# Patient Record
Sex: FEMALE | Race: OTHER | Employment: OTHER | URBAN - METROPOLITAN AREA
[De-identification: names, ages, dates, MRNs, and addresses within clinical notes are randomized per-mention and may not be internally consistent; named-entity substitution may affect disease eponyms.]

---

## 2017-02-21 RX ORDER — ALPRAZOLAM 0.25 MG/1
0.25 TABLET ORAL 2 TIMES DAILY PRN
Qty: 60 TABLET | Refills: 0 | Status: SHIPPED | OUTPATIENT
Start: 2017-02-21 | End: 2017-03-21 | Stop reason: SDUPTHER

## 2017-03-21 ENCOUNTER — OFFICE VISIT (OUTPATIENT)
Dept: INTERNAL MEDICINE | Age: 82
End: 2017-03-21

## 2017-03-21 VITALS
WEIGHT: 154.4 LBS | TEMPERATURE: 98.3 F | DIASTOLIC BLOOD PRESSURE: 80 MMHG | HEART RATE: 67 BPM | OXYGEN SATURATION: 96 % | HEIGHT: 63 IN | BODY MASS INDEX: 27.36 KG/M2 | RESPIRATION RATE: 16 BRPM | SYSTOLIC BLOOD PRESSURE: 126 MMHG

## 2017-03-21 DIAGNOSIS — I10 ESSENTIAL HYPERTENSION: Primary | ICD-10-CM

## 2017-03-21 DIAGNOSIS — F41.9 ANXIETY: ICD-10-CM

## 2017-03-21 DIAGNOSIS — K21.9 GASTROESOPHAGEAL REFLUX DISEASE WITHOUT ESOPHAGITIS: ICD-10-CM

## 2017-03-21 DIAGNOSIS — E78.00 ELEVATED CHOLESTEROL: ICD-10-CM

## 2017-03-21 PROCEDURE — 99214 OFFICE O/P EST MOD 30 MIN: CPT | Performed by: FAMILY MEDICINE

## 2017-03-21 RX ORDER — ALPRAZOLAM 0.25 MG/1
0.25 TABLET ORAL 2 TIMES DAILY PRN
Qty: 60 TABLET | Refills: 2 | Status: SHIPPED | OUTPATIENT
Start: 2017-03-21 | End: 2017-11-07 | Stop reason: SDUPTHER

## 2017-03-21 RX ORDER — ALPRAZOLAM 0.25 MG/1
0.25 TABLET ORAL 2 TIMES DAILY PRN
Qty: 60 TABLET | Refills: 2 | Status: SHIPPED | OUTPATIENT
Start: 2017-03-21 | End: 2017-03-21 | Stop reason: SDUPTHER

## 2017-03-21 ASSESSMENT — PATIENT HEALTH QUESTIONNAIRE - PHQ9
2. FEELING DOWN, DEPRESSED OR HOPELESS: 0
SUM OF ALL RESPONSES TO PHQ QUESTIONS 1-9: 0
SUM OF ALL RESPONSES TO PHQ9 QUESTIONS 1 & 2: 0
1. LITTLE INTEREST OR PLEASURE IN DOING THINGS: 0

## 2017-04-05 ENCOUNTER — OFFICE VISIT (OUTPATIENT)
Dept: INTERNAL MEDICINE | Age: 82
End: 2017-04-05

## 2017-04-05 VITALS
HEIGHT: 63 IN | SYSTOLIC BLOOD PRESSURE: 110 MMHG | WEIGHT: 148 LBS | TEMPERATURE: 97.5 F | BODY MASS INDEX: 26.22 KG/M2 | DIASTOLIC BLOOD PRESSURE: 70 MMHG | OXYGEN SATURATION: 96 % | HEART RATE: 110 BPM | RESPIRATION RATE: 16 BRPM

## 2017-04-05 DIAGNOSIS — K52.9 GASTROENTERITIS: Primary | ICD-10-CM

## 2017-04-05 PROCEDURE — 99213 OFFICE O/P EST LOW 20 MIN: CPT | Performed by: FAMILY MEDICINE

## 2017-04-05 RX ORDER — ONDANSETRON 4 MG/1
4 TABLET, FILM COATED ORAL EVERY 8 HOURS PRN
Qty: 16 TABLET | Refills: 0 | Status: SHIPPED | OUTPATIENT
Start: 2017-04-05 | End: 2017-07-28 | Stop reason: ALTCHOICE

## 2017-04-15 RX ORDER — LANSOPRAZOLE 30 MG/1
CAPSULE, DELAYED RELEASE ORAL
Qty: 90 CAPSULE | Refills: 3 | Status: SHIPPED | OUTPATIENT
Start: 2017-04-15 | End: 2018-03-10 | Stop reason: SDUPTHER

## 2017-05-05 ENCOUNTER — OFFICE VISIT (OUTPATIENT)
Dept: INTERNAL MEDICINE | Age: 82
End: 2017-05-05

## 2017-05-05 VITALS
RESPIRATION RATE: 14 BRPM | DIASTOLIC BLOOD PRESSURE: 72 MMHG | HEIGHT: 63 IN | TEMPERATURE: 98.1 F | HEART RATE: 58 BPM | SYSTOLIC BLOOD PRESSURE: 138 MMHG | OXYGEN SATURATION: 95 %

## 2017-05-05 DIAGNOSIS — R07.9 CHEST PAIN, UNSPECIFIED TYPE: Primary | ICD-10-CM

## 2017-05-05 DIAGNOSIS — F51.04 PSYCHOPHYSIOLOGICAL INSOMNIA: ICD-10-CM

## 2017-05-05 DIAGNOSIS — F41.8 DEPRESSION WITH ANXIETY: ICD-10-CM

## 2017-05-05 PROCEDURE — 99214 OFFICE O/P EST MOD 30 MIN: CPT | Performed by: PHYSICIAN ASSISTANT

## 2017-05-05 PROCEDURE — 93000 ELECTROCARDIOGRAM COMPLETE: CPT | Performed by: PHYSICIAN ASSISTANT

## 2017-05-05 RX ORDER — DULOXETIN HYDROCHLORIDE 30 MG/1
30 CAPSULE, DELAYED RELEASE ORAL DAILY
Qty: 30 CAPSULE | Refills: 3 | Status: SHIPPED | OUTPATIENT
Start: 2017-05-05 | End: 2017-06-16 | Stop reason: SDUPTHER

## 2017-05-05 ASSESSMENT — ENCOUNTER SYMPTOMS
DIARRHEA: 1
NAUSEA: 1
WHEEZING: 0
BACK PAIN: 0
SORE THROAT: 0
CONSTIPATION: 0
ABDOMINAL PAIN: 0
COUGH: 1
SPUTUM PRODUCTION: 0
VOMITING: 0

## 2017-05-15 RX ORDER — LISINOPRIL 10 MG/1
TABLET ORAL
Qty: 90 TABLET | Refills: 3 | Status: SHIPPED | OUTPATIENT
Start: 2017-05-15 | End: 2018-06-26 | Stop reason: SDUPTHER

## 2017-06-16 ENCOUNTER — OFFICE VISIT (OUTPATIENT)
Dept: INTERNAL MEDICINE | Age: 82
End: 2017-06-16

## 2017-06-16 VITALS
TEMPERATURE: 98.7 F | OXYGEN SATURATION: 90 % | SYSTOLIC BLOOD PRESSURE: 144 MMHG | WEIGHT: 140 LBS | HEIGHT: 63 IN | BODY MASS INDEX: 24.8 KG/M2 | DIASTOLIC BLOOD PRESSURE: 90 MMHG | HEART RATE: 90 BPM | RESPIRATION RATE: 16 BRPM

## 2017-06-16 DIAGNOSIS — Z23 NEED FOR PNEUMOCOCCAL VACCINATION: ICD-10-CM

## 2017-06-16 DIAGNOSIS — R42 DIZZINESS: Primary | ICD-10-CM

## 2017-06-16 DIAGNOSIS — R30.0 DYSURIA: ICD-10-CM

## 2017-06-16 DIAGNOSIS — E55.9 VITAMIN D DEFICIENCY: ICD-10-CM

## 2017-06-16 DIAGNOSIS — I10 ESSENTIAL HYPERTENSION: ICD-10-CM

## 2017-06-16 PROCEDURE — G0009 ADMIN PNEUMOCOCCAL VACCINE: HCPCS | Performed by: PHYSICIAN ASSISTANT

## 2017-06-16 PROCEDURE — 90732 PPSV23 VACC 2 YRS+ SUBQ/IM: CPT | Performed by: PHYSICIAN ASSISTANT

## 2017-06-16 PROCEDURE — 99213 OFFICE O/P EST LOW 20 MIN: CPT | Performed by: PHYSICIAN ASSISTANT

## 2017-06-16 RX ORDER — MECLIZINE HCL 12.5 MG/1
12.5 TABLET ORAL 3 TIMES DAILY PRN
Qty: 30 TABLET | Refills: 0 | Status: SHIPPED | OUTPATIENT
Start: 2017-06-16 | End: 2017-06-26

## 2017-06-16 RX ORDER — POTASSIUM CHLORIDE 20 MEQ/1
20 TABLET, EXTENDED RELEASE ORAL DAILY
Qty: 30 TABLET | Refills: 0 | Status: ON HOLD | OUTPATIENT
Start: 2017-06-16 | End: 2017-11-25

## 2017-06-16 ASSESSMENT — ENCOUNTER SYMPTOMS
EYE DISCHARGE: 0
WHEEZING: 0
HEARTBURN: 0
NAUSEA: 0
BLURRED VISION: 0
CONSTIPATION: 0
EYE PAIN: 0
SHORTNESS OF BREATH: 0
BACK PAIN: 0
SORE THROAT: 0
COUGH: 0
EYE REDNESS: 0
ABDOMINAL PAIN: 0
VOMITING: 0
DIARRHEA: 0

## 2017-06-19 ENCOUNTER — NURSE ONLY (OUTPATIENT)
Dept: INTERNAL MEDICINE | Age: 82
End: 2017-06-19

## 2017-06-19 DIAGNOSIS — R42 DIZZINESS: Primary | ICD-10-CM

## 2017-06-19 DIAGNOSIS — R30.0 DYSURIA: ICD-10-CM

## 2017-06-19 DIAGNOSIS — I10 ESSENTIAL HYPERTENSION: ICD-10-CM

## 2017-06-19 DIAGNOSIS — E55.9 VITAMIN D DEFICIENCY: ICD-10-CM

## 2017-06-19 LAB
ALBUMIN SERPL-MCNC: 4.2 G/DL (ref 3.9–4.9)
ALP BLD-CCNC: 76 U/L (ref 40–130)
ALT SERPL-CCNC: 11 U/L (ref 0–33)
ANION GAP SERPL CALCULATED.3IONS-SCNC: 11 MEQ/L (ref 7–13)
AST SERPL-CCNC: 18 U/L (ref 0–35)
BACTERIA: NORMAL /HPF
BASOPHILS ABSOLUTE: 0.1 K/UL (ref 0–0.2)
BASOPHILS RELATIVE PERCENT: 0.9 %
BILIRUB SERPL-MCNC: 0.7 MG/DL (ref 0–1.2)
BILIRUBIN URINE: NEGATIVE
BLOOD, URINE: NEGATIVE
BUN BLDV-MCNC: 13 MG/DL (ref 8–23)
CALCIUM SERPL-MCNC: 8.9 MG/DL (ref 8.6–10.2)
CHLORIDE BLD-SCNC: 102 MEQ/L (ref 98–107)
CLARITY: CLEAR
CO2: 27 MEQ/L (ref 22–29)
COLOR: YELLOW
CREAT SERPL-MCNC: 0.61 MG/DL (ref 0.5–0.9)
EOSINOPHILS ABSOLUTE: 0.2 K/UL (ref 0–0.7)
EOSINOPHILS RELATIVE PERCENT: 3 %
EPITHELIAL CELLS, UA: NORMAL /HPF
GFR AFRICAN AMERICAN: >60
GFR NON-AFRICAN AMERICAN: >60
GLOBULIN: 2.3 G/DL (ref 2.3–3.5)
GLUCOSE BLD-MCNC: 100 MG/DL (ref 74–109)
GLUCOSE URINE: NEGATIVE MG/DL
HCT VFR BLD CALC: 47.2 % (ref 37–47)
HEMOGLOBIN: 15.5 G/DL (ref 12–16)
KETONES, URINE: NEGATIVE MG/DL
LEUKOCYTE ESTERASE, URINE: ABNORMAL
LYMPHOCYTES ABSOLUTE: 1.6 K/UL (ref 1–4.8)
LYMPHOCYTES RELATIVE PERCENT: 25.4 %
MCH RBC QN AUTO: 31.3 PG (ref 27–31.3)
MCHC RBC AUTO-ENTMCNC: 32.8 % (ref 33–37)
MCV RBC AUTO: 95.5 FL (ref 82–100)
MONOCYTES ABSOLUTE: 0.6 K/UL (ref 0.2–0.8)
MONOCYTES RELATIVE PERCENT: 9.6 %
NEUTROPHILS ABSOLUTE: 3.8 K/UL (ref 1.4–6.5)
NEUTROPHILS RELATIVE PERCENT: 61.1 %
NITRITE, URINE: NEGATIVE
PDW BLD-RTO: 14.5 % (ref 11.5–14.5)
PH UA: 7.5 (ref 5–9)
PLATELET # BLD: 258 K/UL (ref 130–400)
POTASSIUM SERPL-SCNC: 4.6 MEQ/L (ref 3.5–5.1)
PROTEIN UA: NEGATIVE MG/DL
RBC # BLD: 4.94 M/UL (ref 4.2–5.4)
RBC UA: NORMAL /HPF (ref 0–2)
SODIUM BLD-SCNC: 140 MEQ/L (ref 132–144)
SPECIFIC GRAVITY UA: 1.01 (ref 1–1.03)
TOTAL PROTEIN: 6.5 G/DL (ref 6.4–8.1)
UROBILINOGEN, URINE: 0.2 E.U./DL
VITAMIN D 25-HYDROXY: 77.2 NG/ML (ref 30–100)
WBC # BLD: 6.2 K/UL (ref 4.8–10.8)
WBC UA: NORMAL /HPF (ref 0–5)

## 2017-06-19 PROCEDURE — 36415 COLL VENOUS BLD VENIPUNCTURE: CPT | Performed by: PHYSICIAN ASSISTANT

## 2017-06-21 LAB — URINE CULTURE, ROUTINE: NORMAL

## 2017-06-27 ENCOUNTER — TELEPHONE (OUTPATIENT)
Dept: INTERNAL MEDICINE | Age: 82
End: 2017-06-27

## 2017-07-28 ENCOUNTER — OFFICE VISIT (OUTPATIENT)
Dept: INTERNAL MEDICINE | Age: 82
End: 2017-07-28

## 2017-07-28 VITALS
HEART RATE: 60 BPM | WEIGHT: 143 LBS | DIASTOLIC BLOOD PRESSURE: 78 MMHG | SYSTOLIC BLOOD PRESSURE: 130 MMHG | TEMPERATURE: 99 F | RESPIRATION RATE: 14 BRPM | BODY MASS INDEX: 25.34 KG/M2 | HEIGHT: 63 IN | OXYGEN SATURATION: 96 %

## 2017-07-28 DIAGNOSIS — I10 ESSENTIAL HYPERTENSION: ICD-10-CM

## 2017-07-28 DIAGNOSIS — R42 DIZZINESS: Primary | ICD-10-CM

## 2017-07-28 PROCEDURE — 99213 OFFICE O/P EST LOW 20 MIN: CPT | Performed by: PHYSICIAN ASSISTANT

## 2017-07-28 ASSESSMENT — ENCOUNTER SYMPTOMS
DIARRHEA: 0
BACK PAIN: 0
COUGH: 0
EYE REDNESS: 0
SHORTNESS OF BREATH: 0
BLURRED VISION: 0
NAUSEA: 0
DOUBLE VISION: 0
EYE PAIN: 0
CONSTIPATION: 0
WHEEZING: 0
EYE DISCHARGE: 0
ABDOMINAL PAIN: 0
SORE THROAT: 0
VOMITING: 0
HEARTBURN: 0

## 2017-08-10 ENCOUNTER — HOSPITAL ENCOUNTER (OUTPATIENT)
Dept: CT IMAGING | Age: 82
Discharge: HOME | End: 2017-08-10
Payer: MEDICARE

## 2017-08-10 DIAGNOSIS — R42 DIZZINESS: ICD-10-CM

## 2017-08-10 PROCEDURE — 70450 CT HEAD/BRAIN W/O DYE: CPT

## 2017-08-24 ENCOUNTER — HOSPITAL ENCOUNTER (OUTPATIENT)
Dept: ULTRASOUND IMAGING | Age: 82
Discharge: HOME | End: 2017-08-24
Payer: MEDICARE

## 2017-08-24 DIAGNOSIS — R42 DIZZINESS: ICD-10-CM

## 2017-08-24 PROCEDURE — 93880 EXTRACRANIAL BILAT STUDY: CPT

## 2017-11-07 RX ORDER — ALPRAZOLAM 0.25 MG/1
0.25 TABLET ORAL 2 TIMES DAILY PRN
Qty: 60 TABLET | Refills: 2 | Status: ON HOLD | OUTPATIENT
Start: 2017-11-07 | End: 2017-11-25

## 2017-11-07 NOTE — TELEPHONE ENCOUNTER
Last OV 7/28/2017  Last urine DS 9/28/2016   Last contract 9/29/2016  Needs Urine DS and Contract  Please call patient daughter Goldy Zane when ready [phone]

## 2017-11-09 ENCOUNTER — NURSE ONLY (OUTPATIENT)
Dept: INTERNAL MEDICINE | Age: 82
End: 2017-11-09

## 2017-11-09 DIAGNOSIS — Z79.899 ENCOUNTER FOR LONG-TERM CURRENT USE OF MEDICATION: Primary | ICD-10-CM

## 2017-11-09 LAB
AMPHETAMINE SCREEN, URINE: NORMAL
BARBITURATE SCREEN URINE: NORMAL
BENZODIAZEPINE SCREEN, URINE: NORMAL
CANNABINOID SCREEN URINE: NORMAL
COCAINE METABOLITE SCREEN URINE: NORMAL
Lab: NORMAL
OPIATE SCREEN URINE: NORMAL
PHENCYCLIDINE SCREEN URINE: NORMAL

## 2017-11-25 ENCOUNTER — APPOINTMENT (OUTPATIENT)
Dept: CT IMAGING | Age: 82
DRG: 088 | End: 2017-11-25
Payer: MEDICARE

## 2017-11-25 ENCOUNTER — HOSPITAL ENCOUNTER (INPATIENT)
Age: 82
LOS: 4 days | Discharge: HOME HEALTH | DRG: 088 | End: 2017-11-29
Attending: FAMILY MEDICINE
Payer: MEDICARE

## 2017-11-25 ENCOUNTER — APPOINTMENT (OUTPATIENT)
Dept: GENERAL RADIOLOGY | Age: 82
DRG: 088 | End: 2017-11-25
Payer: MEDICARE

## 2017-11-25 DIAGNOSIS — R40.0 SOMNOLENCE: Primary | ICD-10-CM

## 2017-11-25 DIAGNOSIS — R40.2410 GLASGOW COMA SCALE TOTAL SCORE 13-15, UNSPECIFIED COMA TIMING: ICD-10-CM

## 2017-11-25 PROBLEM — I48.91 ATRIAL FIBRILLATION (HCC): Chronic | Status: ACTIVE | Noted: 2017-11-25

## 2017-11-25 PROBLEM — F40.298: Status: ACTIVE | Noted: 2017-11-25

## 2017-11-25 PROBLEM — Z79.899 POLYPHARMACY: Status: ACTIVE | Noted: 2017-11-25

## 2017-11-25 PROBLEM — H35.3130 BILATERAL NONEXUDATIVE AGE-RELATED MACULAR DEGENERATION: Status: ACTIVE | Noted: 2017-10-31

## 2017-11-25 PROBLEM — G93.40 ACUTE ENCEPHALOPATHY: Status: ACTIVE | Noted: 2017-11-25

## 2017-11-25 PROBLEM — F43.21 MOURNING: Status: ACTIVE | Noted: 2017-11-25

## 2017-11-25 PROBLEM — Z63.4 DEATH OF HUSBAND: Status: ACTIVE | Noted: 2017-11-25

## 2017-11-25 PROBLEM — M48.061 LUMBAR SPINAL STENOSIS: Status: ACTIVE | Noted: 2017-11-25

## 2017-11-25 LAB
ALBUMIN SERPL-MCNC: 4.5 G/DL (ref 3.9–4.9)
ALP BLD-CCNC: 78 U/L (ref 40–130)
ALT SERPL-CCNC: 15 U/L (ref 0–33)
AMPHETAMINE SCREEN, URINE: ABNORMAL
ANION GAP SERPL CALCULATED.3IONS-SCNC: 14 MEQ/L (ref 7–13)
AST SERPL-CCNC: 25 U/L (ref 0–35)
BARBITURATE SCREEN URINE: ABNORMAL
BASE EXCESS ARTERIAL: 4 (ref -3–3)
BASOPHILS ABSOLUTE: 0 K/UL (ref 0–0.2)
BASOPHILS RELATIVE PERCENT: 0.2 %
BENZODIAZEPINE SCREEN, URINE: POSITIVE
BETA-HYDROXYBUTYRATE: 1.8 MG/DL (ref 0.2–2.8)
BILIRUB SERPL-MCNC: 0.8 MG/DL (ref 0–1.2)
BILIRUBIN URINE: NEGATIVE
BLOOD, URINE: NEGATIVE
BUN BLDV-MCNC: 11 MG/DL (ref 8–23)
CALCIUM SERPL-MCNC: 9.6 MG/DL (ref 8.6–10.2)
CANNABINOID SCREEN URINE: ABNORMAL
CHLORIDE BLD-SCNC: 95 MEQ/L (ref 98–107)
CK MB: 5 NG/ML (ref 0–3.8)
CLARITY: CLEAR
CO2: 28 MEQ/L (ref 22–29)
COCAINE METABOLITE SCREEN URINE: ABNORMAL
COLOR: YELLOW
CREAT SERPL-MCNC: 0.48 MG/DL (ref 0.5–0.9)
CREATINE KINASE-MB INDEX: 1.6 % (ref 0–3.5)
EOSINOPHILS ABSOLUTE: 0 K/UL (ref 0–0.7)
EOSINOPHILS RELATIVE PERCENT: 0.1 %
ETHANOL PERCENT: NORMAL G/DL
ETHANOL: <10 MG/DL (ref 0–0.08)
GFR AFRICAN AMERICAN: >60
GFR NON-AFRICAN AMERICAN: >60
GLOBULIN: 2.4 G/DL (ref 2.3–3.5)
GLUCOSE BLD-MCNC: 104 MG/DL (ref 60–115)
GLUCOSE BLD-MCNC: 106 MG/DL (ref 74–109)
GLUCOSE URINE: NEGATIVE MG/DL
HCO3 ARTERIAL: 28.9 MMOL/L (ref 21–29)
HCT VFR BLD CALC: 50.9 % (ref 37–47)
HEMOGLOBIN: 16.8 G/DL (ref 12–16)
KETONES, URINE: NEGATIVE MG/DL
LACTATE: 0.86 MMOL/L (ref 0.4–2)
LACTIC ACID: 1.9 MMOL/L (ref 0.5–2.2)
LEUKOCYTE ESTERASE, URINE: NEGATIVE
LYMPHOCYTES ABSOLUTE: 0.8 K/UL (ref 1–4.8)
LYMPHOCYTES RELATIVE PERCENT: 5.5 %
Lab: ABNORMAL
MAGNESIUM: 2.2 MG/DL (ref 1.7–2.3)
MCH RBC QN AUTO: 31.5 PG (ref 27–31.3)
MCHC RBC AUTO-ENTMCNC: 33 % (ref 33–37)
MCV RBC AUTO: 95.6 FL (ref 82–100)
MONOCYTES ABSOLUTE: 0.8 K/UL (ref 0.2–0.8)
MONOCYTES RELATIVE PERCENT: 5.3 %
NEUTROPHILS ABSOLUTE: 12.9 K/UL (ref 1.4–6.5)
NEUTROPHILS RELATIVE PERCENT: 88.9 %
NITRITE, URINE: NEGATIVE
O2 SAT, ARTERIAL: 97 % (ref 93–100)
OPIATE SCREEN URINE: ABNORMAL
PCO2 ARTERIAL: 47 MM HG (ref 35–45)
PDW BLD-RTO: 14.1 % (ref 11.5–14.5)
PERFORMED ON: ABNORMAL
PERFORMED ON: NORMAL
PH ARTERIAL: 7.4 (ref 7.35–7.45)
PH UA: 7.5 (ref 5–9)
PHENCYCLIDINE SCREEN URINE: ABNORMAL
PHOSPHORUS: 3 MG/DL (ref 2.5–4.5)
PLATELET # BLD: 230 K/UL (ref 130–400)
PO2 ARTERIAL: 88 MM HG (ref 75–108)
POC FIO2: 100
POC SAMPLE TYPE: ABNORMAL
POTASSIUM SERPL-SCNC: 4.4 MEQ/L (ref 3.5–5.1)
PRO-BNP: 425 PG/ML
PROTEIN UA: NEGATIVE MG/DL
RBC # BLD: 5.32 M/UL (ref 4.2–5.4)
SODIUM BLD-SCNC: 137 MEQ/L (ref 132–144)
SPECIFIC GRAVITY UA: 1.01 (ref 1–1.03)
TCO2 ARTERIAL: 30 (ref 22–29)
TOTAL CK: 310 U/L (ref 0–170)
TOTAL PROTEIN: 6.9 G/DL (ref 6.4–8.1)
TROPONIN: <0.01 NG/ML (ref 0–0.01)
TSH SERPL DL<=0.05 MIU/L-ACNC: 0.71 UIU/ML (ref 0.27–4.2)
UROBILINOGEN, URINE: 0.2 E.U./DL
VITAMIN D 25-HYDROXY: 74.8 NG/ML (ref 30–100)
WBC # BLD: 14.5 K/UL (ref 4.8–10.8)

## 2017-11-25 PROCEDURE — 96372 THER/PROPH/DIAG INJ SC/IM: CPT

## 2017-11-25 PROCEDURE — 81003 URINALYSIS AUTO W/O SCOPE: CPT

## 2017-11-25 PROCEDURE — 2580000003 HC RX 258

## 2017-11-25 PROCEDURE — 80053 COMPREHEN METABOLIC PANEL: CPT

## 2017-11-25 PROCEDURE — 70450 CT HEAD/BRAIN W/O DYE: CPT

## 2017-11-25 PROCEDURE — 84100 ASSAY OF PHOSPHORUS: CPT

## 2017-11-25 PROCEDURE — 2580000003 HC RX 258: Performed by: INTERNAL MEDICINE

## 2017-11-25 PROCEDURE — 36600 WITHDRAWAL OF ARTERIAL BLOOD: CPT

## 2017-11-25 PROCEDURE — 82553 CREATINE MB FRACTION: CPT

## 2017-11-25 PROCEDURE — 71010 XR CHEST PORTABLE: CPT

## 2017-11-25 PROCEDURE — 36415 COLL VENOUS BLD VENIPUNCTURE: CPT

## 2017-11-25 PROCEDURE — 83735 ASSAY OF MAGNESIUM: CPT

## 2017-11-25 PROCEDURE — 6360000002 HC RX W HCPCS: Performed by: INTERNAL MEDICINE

## 2017-11-25 PROCEDURE — 80307 DRUG TEST PRSMV CHEM ANLYZR: CPT

## 2017-11-25 PROCEDURE — 82010 KETONE BODYS QUAN: CPT

## 2017-11-25 PROCEDURE — G0480 DRUG TEST DEF 1-7 CLASSES: HCPCS

## 2017-11-25 PROCEDURE — 85025 COMPLETE CBC W/AUTO DIFF WBC: CPT

## 2017-11-25 PROCEDURE — 83605 ASSAY OF LACTIC ACID: CPT

## 2017-11-25 PROCEDURE — 82803 BLOOD GASES ANY COMBINATION: CPT

## 2017-11-25 PROCEDURE — 82306 VITAMIN D 25 HYDROXY: CPT

## 2017-11-25 PROCEDURE — 1210000000 HC MED SURG R&B

## 2017-11-25 PROCEDURE — 2700000000 HC OXYGEN THERAPY PER DAY

## 2017-11-25 PROCEDURE — 84443 ASSAY THYROID STIM HORMONE: CPT

## 2017-11-25 PROCEDURE — 99285 EMERGENCY DEPT VISIT HI MDM: CPT

## 2017-11-25 PROCEDURE — 87086 URINE CULTURE/COLONY COUNT: CPT

## 2017-11-25 PROCEDURE — 6360000002 HC RX W HCPCS

## 2017-11-25 PROCEDURE — 93005 ELECTROCARDIOGRAM TRACING: CPT

## 2017-11-25 PROCEDURE — 84484 ASSAY OF TROPONIN QUANT: CPT

## 2017-11-25 PROCEDURE — 82550 ASSAY OF CK (CPK): CPT

## 2017-11-25 PROCEDURE — 96375 TX/PRO/DX INJ NEW DRUG ADDON: CPT

## 2017-11-25 PROCEDURE — 82948 REAGENT STRIP/BLOOD GLUCOSE: CPT

## 2017-11-25 PROCEDURE — 83880 ASSAY OF NATRIURETIC PEPTIDE: CPT

## 2017-11-25 RX ORDER — ACETAMINOPHEN 325 MG/1
650 TABLET ORAL EVERY 4 HOURS PRN
Status: DISCONTINUED | OUTPATIENT
Start: 2017-11-25 | End: 2017-11-26

## 2017-11-25 RX ORDER — POTASSIUM CHLORIDE 7.45 MG/ML
10 INJECTION INTRAVENOUS PRN
Status: DISCONTINUED | OUTPATIENT
Start: 2017-11-25 | End: 2017-11-29 | Stop reason: HOSPADM

## 2017-11-25 RX ORDER — 0.9 % SODIUM CHLORIDE 0.9 %
1000 INTRAVENOUS SOLUTION INTRAVENOUS ONCE
Status: COMPLETED | OUTPATIENT
Start: 2017-11-25 | End: 2017-11-25

## 2017-11-25 RX ORDER — SODIUM CHLORIDE 9 MG/ML
INJECTION, SOLUTION INTRAVENOUS
Status: COMPLETED
Start: 2017-11-25 | End: 2017-11-25

## 2017-11-25 RX ORDER — SODIUM CHLORIDE 0.9 % (FLUSH) 0.9 %
10 SYRINGE (ML) INJECTION EVERY 12 HOURS SCHEDULED
Status: DISCONTINUED | OUTPATIENT
Start: 2017-11-25 | End: 2017-11-29 | Stop reason: HOSPADM

## 2017-11-25 RX ORDER — POTASSIUM CHLORIDE 20MEQ/15ML
40 LIQUID (ML) ORAL PRN
Status: DISCONTINUED | OUTPATIENT
Start: 2017-11-25 | End: 2017-11-29 | Stop reason: HOSPADM

## 2017-11-25 RX ORDER — NALOXONE HYDROCHLORIDE 1 MG/ML
INJECTION INTRAMUSCULAR; INTRAVENOUS; SUBCUTANEOUS
Status: COMPLETED
Start: 2017-11-25 | End: 2017-11-25

## 2017-11-25 RX ORDER — SODIUM CHLORIDE 9 MG/ML
INJECTION, SOLUTION INTRAVENOUS CONTINUOUS
Status: DISCONTINUED | OUTPATIENT
Start: 2017-11-25 | End: 2017-11-25

## 2017-11-25 RX ORDER — DOCUSATE SODIUM 100 MG/1
100 CAPSULE, LIQUID FILLED ORAL 2 TIMES DAILY
Status: DISCONTINUED | OUTPATIENT
Start: 2017-11-25 | End: 2017-11-26

## 2017-11-25 RX ORDER — ONDANSETRON 2 MG/ML
4 INJECTION INTRAMUSCULAR; INTRAVENOUS EVERY 6 HOURS PRN
Status: DISCONTINUED | OUTPATIENT
Start: 2017-11-25 | End: 2017-11-29 | Stop reason: HOSPADM

## 2017-11-25 RX ORDER — LATANOPROST 50 UG/ML
1 SOLUTION/ DROPS OPHTHALMIC NIGHTLY
Status: DISCONTINUED | OUTPATIENT
Start: 2017-11-25 | End: 2017-11-29 | Stop reason: HOSPADM

## 2017-11-25 RX ORDER — POTASSIUM CHLORIDE 20 MEQ/1
40 TABLET, EXTENDED RELEASE ORAL PRN
Status: DISCONTINUED | OUTPATIENT
Start: 2017-11-25 | End: 2017-11-29 | Stop reason: HOSPADM

## 2017-11-25 RX ORDER — GABAPENTIN 300 MG/1
2 CAPSULE ORAL NIGHTLY
Status: ON HOLD | COMMUNITY
Start: 2017-10-30 | End: 2017-11-29 | Stop reason: HOSPADM

## 2017-11-25 RX ORDER — CYANOCOBALAMIN 1000 UG/ML
1000 INJECTION INTRAMUSCULAR; SUBCUTANEOUS ONCE
Status: COMPLETED | OUTPATIENT
Start: 2017-11-25 | End: 2017-11-25

## 2017-11-25 RX ORDER — DEXTROSE AND SODIUM CHLORIDE 5; .45 G/100ML; G/100ML
INJECTION, SOLUTION INTRAVENOUS CONTINUOUS
Status: DISCONTINUED | OUTPATIENT
Start: 2017-11-25 | End: 2017-11-26

## 2017-11-25 RX ORDER — SODIUM CHLORIDE 0.9 % (FLUSH) 0.9 %
10 SYRINGE (ML) INJECTION PRN
Status: DISCONTINUED | OUTPATIENT
Start: 2017-11-25 | End: 2017-11-29 | Stop reason: HOSPADM

## 2017-11-25 RX ADMIN — CYANOCOBALAMIN 1000 MCG: 1000 INJECTION INTRAMUSCULAR; SUBCUTANEOUS at 21:55

## 2017-11-25 RX ADMIN — SODIUM CHLORIDE 1000 ML: 9 INJECTION, SOLUTION INTRAVENOUS at 21:54

## 2017-11-25 RX ADMIN — SODIUM CHLORIDE 1000 ML: 900 INJECTION, SOLUTION INTRAVENOUS at 14:16

## 2017-11-25 RX ADMIN — NALOXONE HYDROCHLORIDE 2 MG: 1 INJECTION PARENTERAL at 14:02

## 2017-11-25 RX ADMIN — ENOXAPARIN SODIUM 40 MG: 40 INJECTION, SOLUTION INTRAVENOUS; SUBCUTANEOUS at 21:55

## 2017-11-25 RX ADMIN — Medication 1000 ML: at 14:16

## 2017-11-25 RX ADMIN — SODIUM CHLORIDE: 9 INJECTION, SOLUTION INTRAVENOUS at 17:53

## 2017-11-25 ASSESSMENT — ENCOUNTER SYMPTOMS
WHEEZING: 0
SHORTNESS OF BREATH: 0
CHOKING: 0
CHEST TIGHTNESS: 0
BACK PAIN: 0
APNEA: 0
COUGH: 0
STRIDOR: 0

## 2017-11-26 ENCOUNTER — APPOINTMENT (OUTPATIENT)
Dept: MRI IMAGING | Age: 82
DRG: 088 | End: 2017-11-26
Payer: MEDICARE

## 2017-11-26 LAB
ANION GAP SERPL CALCULATED.3IONS-SCNC: 14 MEQ/L (ref 7–13)
BASOPHILS ABSOLUTE: 0 K/UL (ref 0–0.2)
BASOPHILS RELATIVE PERCENT: 0.4 %
BUN BLDV-MCNC: 9 MG/DL (ref 8–23)
CALCIUM SERPL-MCNC: 8.2 MG/DL (ref 8.6–10.2)
CHLORIDE BLD-SCNC: 104 MEQ/L (ref 98–107)
CK MB: 4.1 NG/ML (ref 0–3.8)
CO2: 23 MEQ/L (ref 22–29)
CREAT SERPL-MCNC: 0.42 MG/DL (ref 0.5–0.9)
CREATINE KINASE-MB INDEX: 0.9 % (ref 0–3.5)
EOSINOPHILS ABSOLUTE: 0.1 K/UL (ref 0–0.7)
EOSINOPHILS RELATIVE PERCENT: 0.7 %
GFR AFRICAN AMERICAN: >60
GFR NON-AFRICAN AMERICAN: >60
GLUCOSE BLD-MCNC: 87 MG/DL (ref 74–109)
HCT VFR BLD CALC: 45.7 % (ref 37–47)
HEMOGLOBIN: 15.1 G/DL (ref 12–16)
LACTIC ACID: 0.8 MMOL/L (ref 0.5–2.2)
LYMPHOCYTES ABSOLUTE: 1.5 K/UL (ref 1–4.8)
LYMPHOCYTES RELATIVE PERCENT: 18.7 %
MCH RBC QN AUTO: 31.9 PG (ref 27–31.3)
MCHC RBC AUTO-ENTMCNC: 33 % (ref 33–37)
MCV RBC AUTO: 96.7 FL (ref 82–100)
MONOCYTES ABSOLUTE: 0.5 K/UL (ref 0.2–0.8)
MONOCYTES RELATIVE PERCENT: 6.9 %
NEUTROPHILS ABSOLUTE: 5.7 K/UL (ref 1.4–6.5)
NEUTROPHILS RELATIVE PERCENT: 73.3 %
PDW BLD-RTO: 13.8 % (ref 11.5–14.5)
PLATELET # BLD: 226 K/UL (ref 130–400)
POTASSIUM SERPL-SCNC: 4 MEQ/L (ref 3.5–5.1)
RBC # BLD: 4.73 M/UL (ref 4.2–5.4)
SODIUM BLD-SCNC: 141 MEQ/L (ref 132–144)
TOTAL CK: 451 U/L (ref 0–170)
WBC # BLD: 7.8 K/UL (ref 4.8–10.8)

## 2017-11-26 PROCEDURE — 6360000002 HC RX W HCPCS: Performed by: INTERNAL MEDICINE

## 2017-11-26 PROCEDURE — 85025 COMPLETE CBC W/AUTO DIFF WBC: CPT

## 2017-11-26 PROCEDURE — 2500000003 HC RX 250 WO HCPCS: Performed by: INTERNAL MEDICINE

## 2017-11-26 PROCEDURE — G8996 SWALLOW CURRENT STATUS: HCPCS

## 2017-11-26 PROCEDURE — 6370000000 HC RX 637 (ALT 250 FOR IP): Performed by: INTERNAL MEDICINE

## 2017-11-26 PROCEDURE — 92610 EVALUATE SWALLOWING FUNCTION: CPT

## 2017-11-26 PROCEDURE — 1210000000 HC MED SURG R&B

## 2017-11-26 PROCEDURE — 96365 THER/PROPH/DIAG IV INF INIT: CPT

## 2017-11-26 PROCEDURE — 83605 ASSAY OF LACTIC ACID: CPT

## 2017-11-26 PROCEDURE — 6360000004 HC RX CONTRAST MEDICATION: Performed by: INTERNAL MEDICINE

## 2017-11-26 PROCEDURE — 80048 BASIC METABOLIC PNL TOTAL CA: CPT

## 2017-11-26 PROCEDURE — 36415 COLL VENOUS BLD VENIPUNCTURE: CPT

## 2017-11-26 PROCEDURE — 2580000003 HC RX 258: Performed by: INTERNAL MEDICINE

## 2017-11-26 PROCEDURE — G8997 SWALLOW GOAL STATUS: HCPCS

## 2017-11-26 PROCEDURE — 70553 MRI BRAIN STEM W/O & W/DYE: CPT

## 2017-11-26 PROCEDURE — 82553 CREATINE MB FRACTION: CPT

## 2017-11-26 PROCEDURE — 82550 ASSAY OF CK (CPK): CPT

## 2017-11-26 PROCEDURE — 96375 TX/PRO/DX INJ NEW DRUG ADDON: CPT

## 2017-11-26 PROCEDURE — 2700000000 HC OXYGEN THERAPY PER DAY

## 2017-11-26 PROCEDURE — A9579 GAD-BASE MR CONTRAST NOS,1ML: HCPCS | Performed by: INTERNAL MEDICINE

## 2017-11-26 RX ORDER — THIAMINE HYDROCHLORIDE 100 MG/ML
100 INJECTION, SOLUTION INTRAMUSCULAR; INTRAVENOUS DAILY
Status: DISCONTINUED | OUTPATIENT
Start: 2017-11-26 | End: 2017-11-29 | Stop reason: HOSPADM

## 2017-11-26 RX ORDER — LABETALOL HYDROCHLORIDE 5 MG/ML
10 INJECTION, SOLUTION INTRAVENOUS EVERY 4 HOURS PRN
Status: DISCONTINUED | OUTPATIENT
Start: 2017-11-26 | End: 2017-11-29 | Stop reason: HOSPADM

## 2017-11-26 RX ORDER — ACETAMINOPHEN 650 MG/1
650 SUPPOSITORY RECTAL EVERY 4 HOURS PRN
Status: DISCONTINUED | OUTPATIENT
Start: 2017-11-26 | End: 2017-11-29 | Stop reason: HOSPADM

## 2017-11-26 RX ADMIN — GADOTERIDOL 13 ML: 279.3 INJECTION, SOLUTION INTRAVENOUS at 14:54

## 2017-11-26 RX ADMIN — THIAMINE HYDROCHLORIDE 100 MG: 100 INJECTION, SOLUTION INTRAMUSCULAR; INTRAVENOUS at 15:38

## 2017-11-26 RX ADMIN — DEXTROSE AND SODIUM CHLORIDE: 5; 450 INJECTION, SOLUTION INTRAVENOUS at 10:59

## 2017-11-26 RX ADMIN — SODIUM CHLORIDE, PRESERVATIVE FREE 10 ML: 5 INJECTION INTRAVENOUS at 09:18

## 2017-11-26 RX ADMIN — THIAMINE HYDROCHLORIDE: 100 INJECTION, SOLUTION INTRAMUSCULAR; INTRAVENOUS at 00:09

## 2017-11-26 RX ADMIN — LATANOPROST 1 DROP: 50 SOLUTION OPHTHALMIC at 00:12

## 2017-11-26 RX ADMIN — LABETALOL HYDROCHLORIDE 10 MG: 5 INJECTION INTRAVENOUS at 17:37

## 2017-11-26 RX ADMIN — LEUCINE, PHENYLALANINE, LYSINE, METHIONINE, ISOLEUCINE, VALINE, HISTIDINE, THREONINE, TRYPTOPHAN, ALANINE, GLYCINE, ARGININE, PROLINE, SERINE, TYROSINE, SODIUM ACETATE, DIBASIC POTASSIUM PHOSPHATE, MAGNESIUM CHLORIDE, SODIUM CHLORIDE, CALCIUM CHLORIDE, DEXTROSE
311; 238; 247; 170; 255; 247; 204; 179; 77; 880; 438; 489; 289; 213; 17; 297; 261; 51; 77; 33; 10 INJECTION INTRAVENOUS at 22:43

## 2017-11-26 RX ADMIN — LATANOPROST 1 DROP: 50 SOLUTION OPHTHALMIC at 22:43

## 2017-11-26 RX ADMIN — SODIUM CHLORIDE, PRESERVATIVE FREE 10 ML: 5 INJECTION INTRAVENOUS at 22:43

## 2017-11-27 ENCOUNTER — APPOINTMENT (OUTPATIENT)
Dept: GENERAL RADIOLOGY | Age: 82
DRG: 088 | End: 2017-11-27
Payer: MEDICARE

## 2017-11-27 LAB
EKG ATRIAL RATE: 74 BPM
EKG P AXIS: 48 DEGREES
EKG P-R INTERVAL: 156 MS
EKG Q-T INTERVAL: 422 MS
EKG QRS DURATION: 82 MS
EKG QTC CALCULATION (BAZETT): 468 MS
EKG R AXIS: -2 DEGREES
EKG T AXIS: 51 DEGREES
EKG VENTRICULAR RATE: 74 BPM
URINE CULTURE, ROUTINE: NORMAL

## 2017-11-27 PROCEDURE — 2580000003 HC RX 258: Performed by: PHYSICIAN ASSISTANT

## 2017-11-27 PROCEDURE — G8996 SWALLOW CURRENT STATUS: HCPCS

## 2017-11-27 PROCEDURE — 96375 TX/PRO/DX INJ NEW DRUG ADDON: CPT

## 2017-11-27 PROCEDURE — 6360000002 HC RX W HCPCS: Performed by: INTERNAL MEDICINE

## 2017-11-27 PROCEDURE — G8997 SWALLOW GOAL STATUS: HCPCS

## 2017-11-27 PROCEDURE — G8988 SELF CARE GOAL STATUS: HCPCS

## 2017-11-27 PROCEDURE — 74230 X-RAY XM SWLNG FUNCJ C+: CPT

## 2017-11-27 PROCEDURE — 97162 PT EVAL MOD COMPLEX 30 MIN: CPT

## 2017-11-27 PROCEDURE — G8978 MOBILITY CURRENT STATUS: HCPCS

## 2017-11-27 PROCEDURE — 2500000003 HC RX 250 WO HCPCS: Performed by: INTERNAL MEDICINE

## 2017-11-27 PROCEDURE — 96366 THER/PROPH/DIAG IV INF ADDON: CPT

## 2017-11-27 PROCEDURE — 2580000003 HC RX 258: Performed by: INTERNAL MEDICINE

## 2017-11-27 PROCEDURE — 96376 TX/PRO/DX INJ SAME DRUG ADON: CPT

## 2017-11-27 PROCEDURE — 95816 EEG AWAKE AND DROWSY: CPT

## 2017-11-27 PROCEDURE — 6370000000 HC RX 637 (ALT 250 FOR IP): Performed by: INTERNAL MEDICINE

## 2017-11-27 PROCEDURE — 1210000000 HC MED SURG R&B

## 2017-11-27 PROCEDURE — 92611 MOTION FLUOROSCOPY/SWALLOW: CPT

## 2017-11-27 PROCEDURE — 99222 1ST HOSP IP/OBS MODERATE 55: CPT | Performed by: INTERNAL MEDICINE

## 2017-11-27 PROCEDURE — 97165 OT EVAL LOW COMPLEX 30 MIN: CPT

## 2017-11-27 PROCEDURE — G8979 MOBILITY GOAL STATUS: HCPCS

## 2017-11-27 PROCEDURE — G8987 SELF CARE CURRENT STATUS: HCPCS

## 2017-11-27 RX ORDER — 0.9 % SODIUM CHLORIDE 0.9 %
10 VIAL (ML) INJECTION DAILY
Status: DISCONTINUED | OUTPATIENT
Start: 2017-11-27 | End: 2017-11-29 | Stop reason: HOSPADM

## 2017-11-27 RX ORDER — PANTOPRAZOLE SODIUM 40 MG/10ML
40 INJECTION, POWDER, LYOPHILIZED, FOR SOLUTION INTRAVENOUS
Status: DISCONTINUED | OUTPATIENT
Start: 2017-11-28 | End: 2017-11-29 | Stop reason: HOSPADM

## 2017-11-27 RX ORDER — HYDRALAZINE HYDROCHLORIDE 20 MG/ML
10 INJECTION INTRAMUSCULAR; INTRAVENOUS EVERY 4 HOURS PRN
Status: DISCONTINUED | OUTPATIENT
Start: 2017-11-27 | End: 2017-11-29 | Stop reason: HOSPADM

## 2017-11-27 RX ORDER — LIDOCAINE 50 MG/G
1 PATCH TOPICAL DAILY
Status: DISCONTINUED | OUTPATIENT
Start: 2017-11-27 | End: 2017-11-29 | Stop reason: HOSPADM

## 2017-11-27 RX ADMIN — SODIUM CHLORIDE, PRESERVATIVE FREE 10 ML: 5 INJECTION INTRAVENOUS at 21:34

## 2017-11-27 RX ADMIN — BARIUM SULFATE 80 ML: 400 SUSPENSION ORAL at 12:30

## 2017-11-27 RX ADMIN — THIAMINE HYDROCHLORIDE 100 MG: 100 INJECTION, SOLUTION INTRAMUSCULAR; INTRAVENOUS at 14:18

## 2017-11-27 RX ADMIN — SODIUM CHLORIDE, PRESERVATIVE FREE 10 ML: 5 INJECTION INTRAVENOUS at 14:19

## 2017-11-27 RX ADMIN — BARIUM SULFATE 50 G: 0.81 POWDER, FOR SUSPENSION ORAL at 12:30

## 2017-11-27 RX ADMIN — ACETAMINOPHEN 650 MG: 650 SUPPOSITORY RECTAL at 04:22

## 2017-11-27 RX ADMIN — ONDANSETRON 4 MG: 2 INJECTION INTRAMUSCULAR; INTRAVENOUS at 21:33

## 2017-11-27 RX ADMIN — LATANOPROST 1 DROP: 50 SOLUTION OPHTHALMIC at 21:33

## 2017-11-27 ASSESSMENT — PAIN SCALES - GENERAL
PAINLEVEL_OUTOF10: 5
PAINLEVEL_OUTOF10: 0
PAINLEVEL_OUTOF10: 2
PAINLEVEL_OUTOF10: 5

## 2017-11-27 ASSESSMENT — PAIN DESCRIPTION - ORIENTATION: ORIENTATION: LOWER

## 2017-11-27 ASSESSMENT — ENCOUNTER SYMPTOMS
VOMITING: 0
SHORTNESS OF BREATH: 0
NAUSEA: 0
WHEEZING: 0

## 2017-11-27 ASSESSMENT — PAIN DESCRIPTION - LOCATION: LOCATION: BACK

## 2017-11-28 LAB
ANION GAP SERPL CALCULATED.3IONS-SCNC: 14 MEQ/L (ref 7–13)
BASOPHILS ABSOLUTE: 0.1 K/UL (ref 0–0.2)
BASOPHILS RELATIVE PERCENT: 0.6 %
BUN BLDV-MCNC: 15 MG/DL (ref 8–23)
CALCIUM SERPL-MCNC: 9 MG/DL (ref 8.6–10.2)
CHLORIDE BLD-SCNC: 102 MEQ/L (ref 98–107)
CO2: 25 MEQ/L (ref 22–29)
CREAT SERPL-MCNC: 0.49 MG/DL (ref 0.5–0.9)
EOSINOPHILS ABSOLUTE: 0.2 K/UL (ref 0–0.7)
EOSINOPHILS RELATIVE PERCENT: 2.3 %
GFR AFRICAN AMERICAN: >60
GFR NON-AFRICAN AMERICAN: >60
GLUCOSE BLD-MCNC: 106 MG/DL (ref 74–109)
GLUCOSE BLD-MCNC: 97 MG/DL (ref 60–115)
HCT VFR BLD CALC: 45.8 % (ref 37–47)
HEMOGLOBIN: 15.1 G/DL (ref 12–16)
LYMPHOCYTES ABSOLUTE: 1.8 K/UL (ref 1–4.8)
LYMPHOCYTES RELATIVE PERCENT: 19.3 %
MCH RBC QN AUTO: 31.7 PG (ref 27–31.3)
MCHC RBC AUTO-ENTMCNC: 32.9 % (ref 33–37)
MCV RBC AUTO: 96.1 FL (ref 82–100)
MONOCYTES ABSOLUTE: 0.9 K/UL (ref 0.2–0.8)
MONOCYTES RELATIVE PERCENT: 9.9 %
NEUTROPHILS ABSOLUTE: 6.2 K/UL (ref 1.4–6.5)
NEUTROPHILS RELATIVE PERCENT: 67.9 %
PDW BLD-RTO: 14.2 % (ref 11.5–14.5)
PERFORMED ON: NORMAL
PLATELET # BLD: 212 K/UL (ref 130–400)
POTASSIUM SERPL-SCNC: 4.3 MEQ/L (ref 3.5–5.1)
RBC # BLD: 4.77 M/UL (ref 4.2–5.4)
SODIUM BLD-SCNC: 141 MEQ/L (ref 132–144)
WBC # BLD: 9.1 K/UL (ref 4.8–10.8)

## 2017-11-28 PROCEDURE — 6360000002 HC RX W HCPCS: Performed by: INTERNAL MEDICINE

## 2017-11-28 PROCEDURE — 96372 THER/PROPH/DIAG INJ SC/IM: CPT

## 2017-11-28 PROCEDURE — 97112 NEUROMUSCULAR REEDUCATION: CPT

## 2017-11-28 PROCEDURE — 6370000000 HC RX 637 (ALT 250 FOR IP): Performed by: INTERNAL MEDICINE

## 2017-11-28 PROCEDURE — 36415 COLL VENOUS BLD VENIPUNCTURE: CPT

## 2017-11-28 PROCEDURE — 2580000003 HC RX 258: Performed by: INTERNAL MEDICINE

## 2017-11-28 PROCEDURE — C9113 INJ PANTOPRAZOLE SODIUM, VIA: HCPCS | Performed by: PHYSICIAN ASSISTANT

## 2017-11-28 PROCEDURE — 96376 TX/PRO/DX INJ SAME DRUG ADON: CPT

## 2017-11-28 PROCEDURE — 80048 BASIC METABOLIC PNL TOTAL CA: CPT

## 2017-11-28 PROCEDURE — 85025 COMPLETE CBC W/AUTO DIFF WBC: CPT

## 2017-11-28 PROCEDURE — 97116 GAIT TRAINING THERAPY: CPT

## 2017-11-28 PROCEDURE — 1210000000 HC MED SURG R&B

## 2017-11-28 PROCEDURE — 6360000002 HC RX W HCPCS: Performed by: PHYSICIAN ASSISTANT

## 2017-11-28 PROCEDURE — 96375 TX/PRO/DX INJ NEW DRUG ADDON: CPT

## 2017-11-28 PROCEDURE — 2580000003 HC RX 258: Performed by: PHYSICIAN ASSISTANT

## 2017-11-28 RX ORDER — LISINOPRIL 5 MG/1
5 TABLET ORAL DAILY
Status: DISCONTINUED | OUTPATIENT
Start: 2017-11-28 | End: 2017-11-29 | Stop reason: HOSPADM

## 2017-11-28 RX ORDER — ACETAMINOPHEN 325 MG/1
650 TABLET ORAL EVERY 4 HOURS PRN
Status: DISCONTINUED | OUTPATIENT
Start: 2017-11-28 | End: 2017-11-29 | Stop reason: HOSPADM

## 2017-11-28 RX ADMIN — LATANOPROST 1 DROP: 50 SOLUTION OPHTHALMIC at 22:14

## 2017-11-28 RX ADMIN — PANTOPRAZOLE SODIUM 40 MG: 40 INJECTION, POWDER, FOR SOLUTION INTRAVENOUS at 06:04

## 2017-11-28 RX ADMIN — ACETAMINOPHEN 650 MG: 325 TABLET ORAL at 09:24

## 2017-11-28 RX ADMIN — SODIUM CHLORIDE, PRESERVATIVE FREE 10 ML: 5 INJECTION INTRAVENOUS at 09:21

## 2017-11-28 RX ADMIN — THIAMINE HYDROCHLORIDE 100 MG: 100 INJECTION, SOLUTION INTRAMUSCULAR; INTRAVENOUS at 09:20

## 2017-11-28 RX ADMIN — SODIUM CHLORIDE, PRESERVATIVE FREE 10 ML: 5 INJECTION INTRAVENOUS at 06:05

## 2017-11-28 RX ADMIN — ENOXAPARIN SODIUM 40 MG: 40 INJECTION, SOLUTION INTRAVENOUS; SUBCUTANEOUS at 09:20

## 2017-11-28 RX ADMIN — ACETAMINOPHEN 650 MG: 325 TABLET ORAL at 21:56

## 2017-11-28 RX ADMIN — LISINOPRIL 5 MG: 5 TABLET ORAL at 21:56

## 2017-11-28 ASSESSMENT — PAIN DESCRIPTION - LOCATION: LOCATION: BACK

## 2017-11-28 ASSESSMENT — PAIN DESCRIPTION - PAIN TYPE: TYPE: CHRONIC PAIN

## 2017-11-28 ASSESSMENT — ENCOUNTER SYMPTOMS
SHORTNESS OF BREATH: 0
WHEEZING: 0
VOMITING: 0
NAUSEA: 0

## 2017-11-28 ASSESSMENT — PAIN SCALES - GENERAL
PAINLEVEL_OUTOF10: 8
PAINLEVEL_OUTOF10: 6
PAINLEVEL_OUTOF10: 8

## 2017-11-28 ASSESSMENT — PAIN DESCRIPTION - DESCRIPTORS: DESCRIPTORS: ACHING

## 2017-11-29 VITALS
WEIGHT: 137.57 LBS | HEART RATE: 79 BPM | RESPIRATION RATE: 16 BRPM | BODY MASS INDEX: 25.32 KG/M2 | TEMPERATURE: 98.1 F | HEIGHT: 62 IN | DIASTOLIC BLOOD PRESSURE: 72 MMHG | OXYGEN SATURATION: 98 % | SYSTOLIC BLOOD PRESSURE: 154 MMHG

## 2017-11-29 LAB
ANION GAP SERPL CALCULATED.3IONS-SCNC: 15 MEQ/L (ref 7–13)
BASOPHILS ABSOLUTE: 0 K/UL (ref 0–0.2)
BASOPHILS RELATIVE PERCENT: 0.5 %
BUN BLDV-MCNC: 18 MG/DL (ref 8–23)
CALCIUM SERPL-MCNC: 9.1 MG/DL (ref 8.6–10.2)
CHLORIDE BLD-SCNC: 104 MEQ/L (ref 98–107)
CO2: 25 MEQ/L (ref 22–29)
CREAT SERPL-MCNC: 0.7 MG/DL (ref 0.5–0.9)
EOSINOPHILS ABSOLUTE: 0.4 K/UL (ref 0–0.7)
EOSINOPHILS RELATIVE PERCENT: 4.7 %
GFR AFRICAN AMERICAN: >60
GFR NON-AFRICAN AMERICAN: >60
GLUCOSE BLD-MCNC: 92 MG/DL (ref 74–109)
HCT VFR BLD CALC: 45.1 % (ref 37–47)
HEMOGLOBIN: 15 G/DL (ref 12–16)
LYMPHOCYTES ABSOLUTE: 1.8 K/UL (ref 1–4.8)
LYMPHOCYTES RELATIVE PERCENT: 21.1 %
MCH RBC QN AUTO: 32.5 PG (ref 27–31.3)
MCHC RBC AUTO-ENTMCNC: 33.3 % (ref 33–37)
MCV RBC AUTO: 97.6 FL (ref 82–100)
MONOCYTES ABSOLUTE: 0.9 K/UL (ref 0.2–0.8)
MONOCYTES RELATIVE PERCENT: 10.8 %
NEUTROPHILS ABSOLUTE: 5.4 K/UL (ref 1.4–6.5)
NEUTROPHILS RELATIVE PERCENT: 62.9 %
PDW BLD-RTO: 14.1 % (ref 11.5–14.5)
PLATELET # BLD: 213 K/UL (ref 130–400)
POTASSIUM SERPL-SCNC: 4.5 MEQ/L (ref 3.5–5.1)
RBC # BLD: 4.62 M/UL (ref 4.2–5.4)
SODIUM BLD-SCNC: 144 MEQ/L (ref 132–144)
WBC # BLD: 8.6 K/UL (ref 4.8–10.8)

## 2017-11-29 PROCEDURE — 6370000000 HC RX 637 (ALT 250 FOR IP): Performed by: INTERNAL MEDICINE

## 2017-11-29 PROCEDURE — 6360000002 HC RX W HCPCS: Performed by: INTERNAL MEDICINE

## 2017-11-29 PROCEDURE — 80048 BASIC METABOLIC PNL TOTAL CA: CPT

## 2017-11-29 PROCEDURE — 36415 COLL VENOUS BLD VENIPUNCTURE: CPT

## 2017-11-29 PROCEDURE — 6360000002 HC RX W HCPCS: Performed by: PHYSICIAN ASSISTANT

## 2017-11-29 PROCEDURE — 2580000003 HC RX 258: Performed by: INTERNAL MEDICINE

## 2017-11-29 PROCEDURE — 96372 THER/PROPH/DIAG INJ SC/IM: CPT

## 2017-11-29 PROCEDURE — C9113 INJ PANTOPRAZOLE SODIUM, VIA: HCPCS | Performed by: PHYSICIAN ASSISTANT

## 2017-11-29 PROCEDURE — 97116 GAIT TRAINING THERAPY: CPT

## 2017-11-29 PROCEDURE — 85025 COMPLETE CBC W/AUTO DIFF WBC: CPT

## 2017-11-29 PROCEDURE — 96376 TX/PRO/DX INJ SAME DRUG ADON: CPT

## 2017-11-29 RX ADMIN — ENOXAPARIN SODIUM 40 MG: 40 INJECTION, SOLUTION INTRAVENOUS; SUBCUTANEOUS at 08:46

## 2017-11-29 RX ADMIN — PANTOPRAZOLE SODIUM 40 MG: 40 INJECTION, POWDER, FOR SOLUTION INTRAVENOUS at 05:46

## 2017-11-29 RX ADMIN — ACETAMINOPHEN 650 MG: 325 TABLET ORAL at 11:38

## 2017-11-29 RX ADMIN — LISINOPRIL 5 MG: 5 TABLET ORAL at 08:46

## 2017-11-29 RX ADMIN — ACETAMINOPHEN 650 MG: 325 TABLET ORAL at 01:56

## 2017-11-29 RX ADMIN — SODIUM CHLORIDE, PRESERVATIVE FREE 10 ML: 5 INJECTION INTRAVENOUS at 08:47

## 2017-11-29 RX ADMIN — ACETAMINOPHEN 650 MG: 325 TABLET ORAL at 06:01

## 2017-11-29 RX ADMIN — THIAMINE HYDROCHLORIDE 100 MG: 100 INJECTION, SOLUTION INTRAMUSCULAR; INTRAVENOUS at 11:19

## 2017-11-29 ASSESSMENT — PAIN SCALES - GENERAL
PAINLEVEL_OUTOF10: 7
PAINLEVEL_OUTOF10: 8
PAINLEVEL_OUTOF10: 8

## 2017-12-01 ENCOUNTER — TELEPHONE (OUTPATIENT)
Dept: INTERNAL MEDICINE | Age: 82
End: 2017-12-01

## 2017-12-01 NOTE — TELEPHONE ENCOUNTER
Howard Lyle 1626 IP Discharge Follow up Call    Date of discharge: 11-29-17  Facility: [address] [place] [address] services provided:  Scheduled appointment with PCP-Julissa Baeza PA-C  Obtained and reviewed discharge summary and/or continuity of care documents    Reason for Hospital Visit:  Polypharmacy, acute encephalopathy  Discharged with Home Health?:  Yes-[facility]    Date of first visit after discharge:  12-1-17 [facility] and 12-5-17  Julissa Baeza PA-C  Status:     improved    Did you receive a discharge summary with list of medication from the hospital? Yes  Review of Instructions:     Understands what to report/when to return?:  Yes   Understands discharge instructions?:  Yes   Following discharge instructions?:  Yes   If not why? Is there any lingering symptoms? No  Are you eating and drinking OK? Yes  Any other problems i.e. Constipation, other symptoms? No  Are there any new complaints of pain? No  If you have a wound is the dressing clean, dry, and intact? N/A-none per AVS  Understands wound care regimen? N/A  Are there any other complaints/concerns that you wish to tell your provider? Patient states she is doing well since discharge. Patient states she is only having back pain, but this is baseline for her. Reviewed medications and patient is taking as directed. FU appts/Provider:    Future Appointments  Date Time Provider Malika Jackson   12/5/2017 9:15 AM Julissa Baeza PA-C [facility]       New Medications at discharge?:     Yes and No                      Medication Reconciliation by phone -     Each medication was reviewed (both pre and post hospitalization)  Yes    Were there discrepancies in medications? No  If YES, were discrepancies addressed? Not Applicable    Understands Medications? Yes   Questions about medications from pt or caregiver? Not Applicable   Questions addressed?  Not Applicable                Has all of

## 2017-12-04 ENCOUNTER — HOSPITAL ENCOUNTER (OUTPATIENT)
Dept: GENERAL RADIOLOGY | Age: 82
Discharge: HOME | End: 2017-12-04
Payer: MEDICARE

## 2017-12-04 ENCOUNTER — OFFICE VISIT (OUTPATIENT)
Dept: INTERNAL MEDICINE | Age: 82
End: 2017-12-04

## 2017-12-04 VITALS
RESPIRATION RATE: 16 BRPM | TEMPERATURE: 98.3 F | OXYGEN SATURATION: 96 % | HEART RATE: 68 BPM | SYSTOLIC BLOOD PRESSURE: 124 MMHG | HEIGHT: 62 IN | DIASTOLIC BLOOD PRESSURE: 78 MMHG | WEIGHT: 139.6 LBS | BODY MASS INDEX: 25.69 KG/M2

## 2017-12-04 DIAGNOSIS — M47.896 OTHER OSTEOARTHRITIS OF SPINE, LUMBAR REGION: ICD-10-CM

## 2017-12-04 DIAGNOSIS — E86.0 DEHYDRATION, SEVERE: ICD-10-CM

## 2017-12-04 DIAGNOSIS — R55 SYNCOPE, UNSPECIFIED SYNCOPE TYPE: ICD-10-CM

## 2017-12-04 DIAGNOSIS — M47.896 OTHER OSTEOARTHRITIS OF SPINE, LUMBAR REGION: Primary | ICD-10-CM

## 2017-12-04 PROCEDURE — 72110 X-RAY EXAM L-2 SPINE 4/>VWS: CPT

## 2017-12-04 PROCEDURE — 99495 TRANSJ CARE MGMT MOD F2F 14D: CPT | Performed by: PHYSICIAN ASSISTANT

## 2017-12-04 RX ORDER — IBUPROFEN 800 MG/1
TABLET ORAL
Qty: 30 TABLET | Refills: 3 | Status: SHIPPED | OUTPATIENT
Start: 2017-12-04 | End: 2018-03-27 | Stop reason: SDUPTHER

## 2017-12-04 RX ORDER — POTASSIUM CHLORIDE 1.5 G/1.77G
20 POWDER, FOR SOLUTION ORAL DAILY
COMMUNITY
End: 2018-11-21 | Stop reason: SDUPTHER

## 2017-12-04 RX ORDER — TRAMADOL HYDROCHLORIDE 50 MG/1
50 TABLET ORAL EVERY 6 HOURS PRN
COMMUNITY
End: 2018-06-05 | Stop reason: SDUPTHER

## 2017-12-04 RX ORDER — RANITIDINE 150 MG/1
150 TABLET ORAL DAILY
Qty: 30 TABLET | Refills: 3 | Status: SHIPPED | OUTPATIENT
Start: 2017-12-04 | End: 2018-06-05 | Stop reason: SDUPTHER

## 2017-12-04 RX ORDER — ALPRAZOLAM 0.25 MG/1
0.25 TABLET ORAL NIGHTLY PRN
COMMUNITY
End: 2018-04-23 | Stop reason: SDUPTHER

## 2017-12-04 RX ORDER — METOPROLOL SUCCINATE 50 MG/1
50 TABLET, EXTENDED RELEASE ORAL DAILY
COMMUNITY
End: 2018-03-06

## 2017-12-04 RX ORDER — DULOXETIN HYDROCHLORIDE 30 MG/1
30 CAPSULE, DELAYED RELEASE ORAL DAILY
COMMUNITY
End: 2018-06-07 | Stop reason: SDUPTHER

## 2017-12-04 RX ORDER — GABAPENTIN 300 MG/1
300 CAPSULE ORAL 2 TIMES DAILY
COMMUNITY
End: 2018-06-05 | Stop reason: SDUPTHER

## 2017-12-04 ASSESSMENT — ENCOUNTER SYMPTOMS
EYES NEGATIVE: 1
GASTROINTESTINAL NEGATIVE: 1
RESPIRATORY NEGATIVE: 1
ALLERGIC/IMMUNOLOGIC NEGATIVE: 1
BACK PAIN: 1

## 2017-12-04 NOTE — PROGRESS NOTES
diagnosed with severe dehydration as the cause. Pt is complaining of severe low back pain . Pt had back surgery remotely state hardware was used. She fells as if it is protruding since her fall    Review of Systems:     Review of Systems   HENT: Negative. Eyes: Negative. Respiratory: Negative. Cardiovascular: Negative. Gastrointestinal: Negative. Endocrine: Negative. Genitourinary: Negative. Musculoskeletal: Positive for back pain and gait problem. Skin: Negative. Allergic/Immunologic: Negative. Neurological: Positive for weakness. Negative for dizziness, seizures, syncope, speech difficulty, light-headedness, numbness and headaches. Hematological: Negative. Psychiatric/Behavioral: Negative. All other systems reviewed and are negative. Exam:   /78 (Site: Left Arm, Position: Sitting, Cuff Size: Small Adult)   Pulse 68   Temp 98.3 °F (36.8 °C) (Oral)   Resp 16   Ht 5' 2\" (1.575 m)   Wt 139 lb 9.6 oz (63.3 kg)   SpO2 96%   BMI 25.53 kg/m²   Physical Exam   Constitutional: She is oriented to person, place, and time. She appears well-developed. Cardiovascular: Normal rate, regular rhythm and normal heart sounds. Pulmonary/Chest: Effort normal and breath sounds normal.   Abdominal: Soft. Bowel sounds are normal.   Musculoskeletal: She exhibits tenderness and deformity. Lumbar back: She exhibits decreased range of motion and deformity. Neurological: She is alert and oriented to person, place, and time. Skin: Skin is warm and dry. Assessment:       1. Other osteoarthritis of spine, lumbar region  XR LUMBAR SPINE (MIN 4 VIEWS)    ibuprofen (ADVIL;MOTRIN) 800 MG tablet    ranitidine (ZANTAC) 150 MG tablet   2. Syncope, unspecified syncope type     3. Dehydration, severe           Plan:     Return if symptoms worsen or fail to improve.     Orders Placed This Encounter   Procedures    XR LUMBAR SPINE (MIN 4 VIEWS)     Standing Status:   Future Standing Expiration Date:   12/4/2018     Order Specific Question:   Reason for exam:     Answer:   pain     Orders Placed This Encounter   Medications    ibuprofen (ADVIL;MOTRIN) 800 MG tablet     Sig: One tablet daily by mouth with food as needed for pain     Dispense:  30 tablet     Refill:  3    ranitidine (ZANTAC) 150 MG tablet     Sig: Take 1 tablet by mouth daily     Dispense:  30 tablet     Refill:  3       Diagnostic Tests performed in hospital ct head, mri head, labs  Requested/reviewed: yes    Disease Education: dehydration, syncope    Home Health Care : no    Discussed with other providers:no          Note:  CPT Coding - 96928 (Moderate level - seen within 14 days)      87105 (High level - seen within 7 days)  Needs to have associated phone contact within 2 business days of inpatient discharge    Electronically signed by Neena العلي PA-C on 12/4/2017 at 1:22 PM

## 2017-12-05 ENCOUNTER — TELEPHONE (OUTPATIENT)
Dept: INTERNAL MEDICINE | Age: 82
End: 2017-12-05

## 2017-12-12 ENCOUNTER — TELEPHONE (OUTPATIENT)
Dept: INTERNAL MEDICINE | Age: 82
End: 2017-12-12

## 2017-12-12 RX ORDER — ACETAMINOPHEN AND CODEINE PHOSPHATE 300; 30 MG/1; MG/1
1-2 TABLET ORAL EVERY 6 HOURS PRN
Qty: 40 TABLET | Refills: 0 | Status: SHIPPED | OUTPATIENT
Start: 2017-12-12 | End: 2018-01-25 | Stop reason: ALTCHOICE

## 2017-12-12 NOTE — TELEPHONE ENCOUNTER
Spoke with daughter, she states Carlin can take codeine. She realizes she must take it with food, and it may make her drowsy.

## 2018-01-25 ENCOUNTER — OFFICE VISIT (OUTPATIENT)
Dept: INTERNAL MEDICINE CLINIC | Age: 83
End: 2018-01-25
Payer: MEDICARE

## 2018-01-25 VITALS
TEMPERATURE: 98.3 F | OXYGEN SATURATION: 98 % | SYSTOLIC BLOOD PRESSURE: 138 MMHG | WEIGHT: 131 LBS | RESPIRATION RATE: 16 BRPM | HEART RATE: 98 BPM | DIASTOLIC BLOOD PRESSURE: 84 MMHG | BODY MASS INDEX: 25.72 KG/M2 | HEIGHT: 60 IN

## 2018-01-25 DIAGNOSIS — S22.080A COMPRESSION FRACTURE OF T12 VERTEBRA (HCC): Primary | ICD-10-CM

## 2018-01-25 DIAGNOSIS — M47.894 OTHER OSTEOARTHRITIS OF SPINE, THORACIC REGION: ICD-10-CM

## 2018-01-25 PROCEDURE — 99213 OFFICE O/P EST LOW 20 MIN: CPT | Performed by: PHYSICIAN ASSISTANT

## 2018-01-25 RX ORDER — CALCITONIN SALMON 200 [IU]/.09ML
SPRAY, METERED NASAL
COMMUNITY
Start: 2018-01-05 | End: 2018-01-25 | Stop reason: ALTCHOICE

## 2018-01-25 ASSESSMENT — ENCOUNTER SYMPTOMS
ABDOMINAL PAIN: 0
VOMITING: 0
SINUS PAIN: 0
NAUSEA: 1
DIARRHEA: 0
COUGH: 0
EYE PAIN: 0
DOUBLE VISION: 0
BACK PAIN: 1

## 2018-01-25 NOTE — PROGRESS NOTES
Subjective  Carlin Engel, [de-identified] y.o. female presents today with:  Chief Complaint   Patient presents with    Back Pain     Right sided back pain that radiates into her shoulder. States pain medication isn't helping. She is in so much pain she can't eat at times, down 13 lbs. Sees Pain management at [facility], KIMI Roman, has a follow up next month states she had an lumbar block on 12/18/17. She is unsure if she has since fallen or injuried her self since that visit. HPI  Patient is here with worsening pain in her mid back now radiating to the right lower rib since  a fall injury in November 2017. Imaging of the lumbar spine 12/4/2017 indicated compression fracture at the T12 level. Patient was informed and referred to neurosurgery but she refused the appointment. Since then patient has been followed by Michelle Chappell MD pain mangement at [address] [place] [address]. Patient had an injection to her back with no improvement  Patient has no appetite due to her pain and has lost 13 pounds since November  Review of Systems   Constitutional: Positive for weight loss. Negative for chills and malaise/fatigue. HENT: Negative for congestion, ear pain and sinus pain. Eyes: Negative for double vision and pain. Respiratory: Negative for cough. Cardiovascular: Negative for chest pain. Gastrointestinal: Positive for nausea. Negative for abdominal pain, diarrhea and vomiting. Genitourinary: Negative for dysuria. Musculoskeletal: Positive for back pain and joint pain. Negative for neck pain. Skin: Negative for itching and rash. Neurological: Positive for dizziness. Negative for headaches. Endo/Heme/Allergies: Negative for environmental allergies. Psychiatric/Behavioral: Negative for depression.          Past Medical History:   Diagnosis Date    Anxiety     Arthritis     Chronic back pain     DEGENERATIVE BACK    Depression     Distal radial fracture 2012    Elevated cholesterol     GERD (gastroesophageal reflux disease)    

## 2018-02-21 ENCOUNTER — HOSPITAL ENCOUNTER (OUTPATIENT)
Dept: MRI IMAGING | Age: 83
Discharge: HOME | End: 2018-02-23
Payer: MEDICARE

## 2018-02-21 DIAGNOSIS — M80.08XA FRACTURE OF VERTEBRA DUE TO OSTEOPOROSIS, INITIAL ENCOUNTER (HCC): ICD-10-CM

## 2018-02-21 PROCEDURE — 72148 MRI LUMBAR SPINE W/O DYE: CPT

## 2018-02-21 PROCEDURE — 72146 MRI CHEST SPINE W/O DYE: CPT

## 2018-02-22 PROBLEM — M80.08XA FRACTURE OF VERTEBRA DUE TO OSTEOPOROSIS (HCC): Status: ACTIVE | Noted: 2018-02-22

## 2018-03-06 ENCOUNTER — HOSPITAL ENCOUNTER (OUTPATIENT)
Dept: PREADMISSION TESTING | Age: 83
Discharge: HOME | End: 2018-03-10
Payer: MEDICARE

## 2018-03-06 VITALS
BODY MASS INDEX: 25.91 KG/M2 | WEIGHT: 132 LBS | HEART RATE: 67 BPM | TEMPERATURE: 97.9 F | SYSTOLIC BLOOD PRESSURE: 189 MMHG | DIASTOLIC BLOOD PRESSURE: 73 MMHG | RESPIRATION RATE: 16 BRPM | HEIGHT: 60 IN

## 2018-03-06 PROBLEM — S22.009A MULTIPLE FRACTURES OF THORACIC SPINE (HCC): Status: ACTIVE | Noted: 2018-03-06

## 2018-03-06 LAB
ANION GAP SERPL CALCULATED.3IONS-SCNC: 16 MEQ/L (ref 7–13)
BILIRUBIN URINE: NEGATIVE
BLOOD, URINE: NEGATIVE
BUN BLDV-MCNC: 13 MG/DL (ref 8–23)
CALCIUM SERPL-MCNC: 9.7 MG/DL (ref 8.6–10.2)
CHLORIDE BLD-SCNC: 99 MEQ/L (ref 98–107)
CLARITY: CLEAR
CO2: 28 MEQ/L (ref 22–29)
COLOR: YELLOW
CREAT SERPL-MCNC: 0.58 MG/DL (ref 0.5–0.9)
GFR AFRICAN AMERICAN: >60
GFR NON-AFRICAN AMERICAN: >60
GLUCOSE BLD-MCNC: 108 MG/DL (ref 74–109)
GLUCOSE URINE: NEGATIVE MG/DL
HCT VFR BLD CALC: 47.2 % (ref 37–47)
HEMOGLOBIN: 15.7 G/DL (ref 12–16)
INR BLD: 1
KETONES, URINE: NEGATIVE MG/DL
LEUKOCYTE ESTERASE, URINE: NEGATIVE
MCH RBC QN AUTO: 32.5 PG (ref 27–31.3)
MCHC RBC AUTO-ENTMCNC: 33.2 % (ref 33–37)
MCV RBC AUTO: 98 FL (ref 82–100)
NITRITE, URINE: NEGATIVE
PDW BLD-RTO: 13.9 % (ref 11.5–14.5)
PH UA: 6.5 (ref 5–9)
PLATELET # BLD: 258 K/UL (ref 130–400)
POTASSIUM SERPL-SCNC: 4.3 MEQ/L (ref 3.5–5.1)
PROTEIN UA: NEGATIVE MG/DL
PROTHROMBIN TIME: 10.8 SEC (ref 8.1–13.7)
RBC # BLD: 4.82 M/UL (ref 4.2–5.4)
SODIUM BLD-SCNC: 143 MEQ/L (ref 132–144)
SPECIFIC GRAVITY UA: 1.01 (ref 1–1.03)
URINE REFLEX TO CULTURE: NORMAL
UROBILINOGEN, URINE: 0.2 E.U./DL
WBC # BLD: 7.8 K/UL (ref 4.8–10.8)

## 2018-03-06 PROCEDURE — 85027 COMPLETE CBC AUTOMATED: CPT

## 2018-03-06 PROCEDURE — 85610 PROTHROMBIN TIME: CPT

## 2018-03-06 PROCEDURE — 81003 URINALYSIS AUTO W/O SCOPE: CPT

## 2018-03-06 PROCEDURE — 80048 BASIC METABOLIC PNL TOTAL CA: CPT

## 2018-03-06 RX ORDER — LIDOCAINE HYDROCHLORIDE 10 MG/ML
1 INJECTION, SOLUTION EPIDURAL; INFILTRATION; INTRACAUDAL; PERINEURAL
Status: CANCELLED | OUTPATIENT
Start: 2018-03-06 | End: 2018-03-06

## 2018-03-06 RX ORDER — SODIUM CHLORIDE, SODIUM LACTATE, POTASSIUM CHLORIDE, CALCIUM CHLORIDE 600; 310; 30; 20 MG/100ML; MG/100ML; MG/100ML; MG/100ML
INJECTION, SOLUTION INTRAVENOUS CONTINUOUS
Status: CANCELLED | OUTPATIENT
Start: 2018-03-06

## 2018-03-06 RX ORDER — SODIUM CHLORIDE 0.9 % (FLUSH) 0.9 %
10 SYRINGE (ML) INJECTION PRN
Status: CANCELLED | OUTPATIENT
Start: 2018-03-06

## 2018-03-06 RX ORDER — SODIUM CHLORIDE 0.9 % (FLUSH) 0.9 %
10 SYRINGE (ML) INJECTION EVERY 12 HOURS SCHEDULED
Status: CANCELLED | OUTPATIENT
Start: 2018-03-06

## 2018-03-06 RX ORDER — SODIUM CHLORIDE, SODIUM LACTATE, POTASSIUM CHLORIDE, CALCIUM CHLORIDE 600; 310; 30; 20 MG/100ML; MG/100ML; MG/100ML; MG/100ML
INJECTION, SOLUTION INTRAVENOUS CONTINUOUS
Status: CANCELLED | OUTPATIENT
Start: 2018-03-07

## 2018-03-06 ASSESSMENT — ENCOUNTER SYMPTOMS
STRIDOR: 0
HEARTBURN: 1
CONSTIPATION: 0
BACK PAIN: 1
SHORTNESS OF BREATH: 0
COUGH: 0
SORE THROAT: 0
DOUBLE VISION: 0
DIARRHEA: 0
WHEEZING: 0
BLURRED VISION: 0
NAUSEA: 0
EYES NEGATIVE: 1

## 2018-03-07 ENCOUNTER — HOSPITAL ENCOUNTER (OUTPATIENT)
Dept: GENERAL RADIOLOGY | Age: 83
Setting detail: OUTPATIENT SURGERY
Discharge: HOME | End: 2018-03-09
Attending: NEUROLOGICAL SURGERY
Payer: MEDICARE

## 2018-03-07 ENCOUNTER — HOSPITAL ENCOUNTER (OUTPATIENT)
Age: 83
Setting detail: OUTPATIENT SURGERY
Discharge: HOME | End: 2018-03-07
Attending: NEUROLOGICAL SURGERY | Admitting: NEUROLOGICAL SURGERY
Payer: MEDICARE

## 2018-03-07 ENCOUNTER — ANESTHESIA (OUTPATIENT)
Dept: OPERATING ROOM | Age: 83
End: 2018-03-07
Payer: MEDICARE

## 2018-03-07 ENCOUNTER — ANESTHESIA EVENT (OUTPATIENT)
Dept: OPERATING ROOM | Age: 83
End: 2018-03-07
Payer: MEDICARE

## 2018-03-07 VITALS
BODY MASS INDEX: 24.29 KG/M2 | SYSTOLIC BLOOD PRESSURE: 166 MMHG | RESPIRATION RATE: 16 BRPM | DIASTOLIC BLOOD PRESSURE: 72 MMHG | HEART RATE: 64 BPM | TEMPERATURE: 97.9 F | OXYGEN SATURATION: 96 % | HEIGHT: 62 IN | WEIGHT: 132 LBS

## 2018-03-07 VITALS — SYSTOLIC BLOOD PRESSURE: 151 MMHG | DIASTOLIC BLOOD PRESSURE: 68 MMHG | OXYGEN SATURATION: 100 % | TEMPERATURE: 97.3 F

## 2018-03-07 DIAGNOSIS — R52 PAIN: ICD-10-CM

## 2018-03-07 PROCEDURE — 3209999900 FLUORO FOR SURGICAL PROCEDURES

## 2018-03-07 PROCEDURE — 6360000002 HC RX W HCPCS: Performed by: STUDENT IN AN ORGANIZED HEALTH CARE EDUCATION/TRAINING PROGRAM

## 2018-03-07 PROCEDURE — C1713 ANCHOR/SCREW BN/BN,TIS/BN: HCPCS | Performed by: NEUROLOGICAL SURGERY

## 2018-03-07 PROCEDURE — 2500000003 HC RX 250 WO HCPCS: Performed by: STUDENT IN AN ORGANIZED HEALTH CARE EDUCATION/TRAINING PROGRAM

## 2018-03-07 PROCEDURE — 3600000004 HC SURGERY LEVEL 4 BASE: Performed by: NEUROLOGICAL SURGERY

## 2018-03-07 PROCEDURE — 2580000003 HC RX 258: Performed by: NEUROLOGICAL SURGERY

## 2018-03-07 PROCEDURE — 7100000001 HC PACU RECOVERY - ADDTL 15 MIN: Performed by: NEUROLOGICAL SURGERY

## 2018-03-07 PROCEDURE — 2580000003 HC RX 258: Performed by: NURSE PRACTITIONER

## 2018-03-07 PROCEDURE — 3700000001 HC ADD 15 MINUTES (ANESTHESIA): Performed by: NEUROLOGICAL SURGERY

## 2018-03-07 PROCEDURE — 3700000000 HC ANESTHESIA ATTENDED CARE: Performed by: NEUROLOGICAL SURGERY

## 2018-03-07 PROCEDURE — 7100000010 HC PHASE II RECOVERY - FIRST 15 MIN: Performed by: NEUROLOGICAL SURGERY

## 2018-03-07 PROCEDURE — 7100000011 HC PHASE II RECOVERY - ADDTL 15 MIN: Performed by: NEUROLOGICAL SURGERY

## 2018-03-07 PROCEDURE — 6360000004 HC RX CONTRAST MEDICATION: Performed by: NEUROLOGICAL SURGERY

## 2018-03-07 PROCEDURE — 3600000014 HC SURGERY LEVEL 4 ADDTL 15MIN: Performed by: NEUROLOGICAL SURGERY

## 2018-03-07 PROCEDURE — 7100000000 HC PACU RECOVERY - FIRST 15 MIN: Performed by: NEUROLOGICAL SURGERY

## 2018-03-07 DEVICE — BONE CEMENT C01B HV-R WITH MIXER  US
Type: IMPLANTABLE DEVICE | Status: FUNCTIONAL
Brand: KYPHON® HV-R® BONE CEMENT AND KYPHON® MIXER PACK

## 2018-03-07 RX ORDER — ROCURONIUM BROMIDE 10 MG/ML
INJECTION, SOLUTION INTRAVENOUS PRN
Status: DISCONTINUED | OUTPATIENT
Start: 2018-03-07 | End: 2018-03-07 | Stop reason: SDUPTHER

## 2018-03-07 RX ORDER — FENTANYL CITRATE 50 UG/ML
50 INJECTION, SOLUTION INTRAMUSCULAR; INTRAVENOUS EVERY 10 MIN PRN
Status: DISCONTINUED | OUTPATIENT
Start: 2018-03-07 | End: 2018-03-07 | Stop reason: HOSPADM

## 2018-03-07 RX ORDER — SODIUM CHLORIDE 0.9 % (FLUSH) 0.9 %
10 SYRINGE (ML) INJECTION PRN
Status: DISCONTINUED | OUTPATIENT
Start: 2018-03-07 | End: 2018-03-07 | Stop reason: HOSPADM

## 2018-03-07 RX ORDER — SODIUM CHLORIDE, SODIUM LACTATE, POTASSIUM CHLORIDE, CALCIUM CHLORIDE 600; 310; 30; 20 MG/100ML; MG/100ML; MG/100ML; MG/100ML
INJECTION, SOLUTION INTRAVENOUS CONTINUOUS
Status: DISCONTINUED | OUTPATIENT
Start: 2018-03-07 | End: 2018-03-07 | Stop reason: HOSPADM

## 2018-03-07 RX ORDER — LIDOCAINE HYDROCHLORIDE 20 MG/ML
INJECTION, SOLUTION INFILTRATION; PERINEURAL PRN
Status: DISCONTINUED | OUTPATIENT
Start: 2018-03-07 | End: 2018-03-07 | Stop reason: SDUPTHER

## 2018-03-07 RX ORDER — FENTANYL CITRATE 50 UG/ML
INJECTION, SOLUTION INTRAMUSCULAR; INTRAVENOUS PRN
Status: DISCONTINUED | OUTPATIENT
Start: 2018-03-07 | End: 2018-03-07 | Stop reason: SDUPTHER

## 2018-03-07 RX ORDER — DIPHENHYDRAMINE HYDROCHLORIDE 50 MG/ML
12.5 INJECTION INTRAMUSCULAR; INTRAVENOUS
Status: DISCONTINUED | OUTPATIENT
Start: 2018-03-07 | End: 2018-03-07 | Stop reason: HOSPADM

## 2018-03-07 RX ORDER — ONDANSETRON 2 MG/ML
4 INJECTION INTRAMUSCULAR; INTRAVENOUS
Status: DISCONTINUED | OUTPATIENT
Start: 2018-03-07 | End: 2018-03-07 | Stop reason: HOSPADM

## 2018-03-07 RX ORDER — MEPERIDINE HYDROCHLORIDE 25 MG/ML
12.5 INJECTION INTRAMUSCULAR; INTRAVENOUS; SUBCUTANEOUS EVERY 5 MIN PRN
Status: DISCONTINUED | OUTPATIENT
Start: 2018-03-07 | End: 2018-03-07 | Stop reason: HOSPADM

## 2018-03-07 RX ORDER — METOCLOPRAMIDE HYDROCHLORIDE 5 MG/ML
10 INJECTION INTRAMUSCULAR; INTRAVENOUS
Status: DISCONTINUED | OUTPATIENT
Start: 2018-03-07 | End: 2018-03-07 | Stop reason: HOSPADM

## 2018-03-07 RX ORDER — PROPOFOL 10 MG/ML
INJECTION, EMULSION INTRAVENOUS PRN
Status: DISCONTINUED | OUTPATIENT
Start: 2018-03-07 | End: 2018-03-07 | Stop reason: SDUPTHER

## 2018-03-07 RX ORDER — ONDANSETRON 2 MG/ML
INJECTION INTRAMUSCULAR; INTRAVENOUS PRN
Status: DISCONTINUED | OUTPATIENT
Start: 2018-03-07 | End: 2018-03-07 | Stop reason: SDUPTHER

## 2018-03-07 RX ORDER — MAGNESIUM HYDROXIDE 1200 MG/15ML
LIQUID ORAL CONTINUOUS PRN
Status: DISCONTINUED | OUTPATIENT
Start: 2018-03-07 | End: 2018-03-07 | Stop reason: HOSPADM

## 2018-03-07 RX ORDER — DEXAMETHASONE SODIUM PHOSPHATE 4 MG/ML
INJECTION, SOLUTION INTRA-ARTICULAR; INTRALESIONAL; INTRAMUSCULAR; INTRAVENOUS; SOFT TISSUE PRN
Status: DISCONTINUED | OUTPATIENT
Start: 2018-03-07 | End: 2018-03-07 | Stop reason: SDUPTHER

## 2018-03-07 RX ORDER — LIDOCAINE HYDROCHLORIDE 10 MG/ML
1 INJECTION, SOLUTION EPIDURAL; INFILTRATION; INTRACAUDAL; PERINEURAL
Status: DISCONTINUED | OUTPATIENT
Start: 2018-03-07 | End: 2018-03-07 | Stop reason: HOSPADM

## 2018-03-07 RX ORDER — SODIUM CHLORIDE 0.9 % (FLUSH) 0.9 %
10 SYRINGE (ML) INJECTION EVERY 12 HOURS SCHEDULED
Status: DISCONTINUED | OUTPATIENT
Start: 2018-03-07 | End: 2018-03-07 | Stop reason: HOSPADM

## 2018-03-07 RX ADMIN — SODIUM CHLORIDE, POTASSIUM CHLORIDE, SODIUM LACTATE AND CALCIUM CHLORIDE 50 ML/HR: 600; 310; 30; 20 INJECTION, SOLUTION INTRAVENOUS at 13:59

## 2018-03-07 RX ADMIN — CEFAZOLIN SODIUM 2 G: 1 INJECTION, SOLUTION INTRAVENOUS at 16:58

## 2018-03-07 RX ADMIN — LIDOCAINE HYDROCHLORIDE 60 MG: 20 INJECTION, SOLUTION INFILTRATION; PERINEURAL at 17:01

## 2018-03-07 RX ADMIN — FENTANYL CITRATE 50 MCG: 50 INJECTION, SOLUTION INTRAMUSCULAR; INTRAVENOUS at 17:50

## 2018-03-07 RX ADMIN — SODIUM CHLORIDE, POTASSIUM CHLORIDE, SODIUM LACTATE AND CALCIUM CHLORIDE: 600; 310; 30; 20 INJECTION, SOLUTION INTRAVENOUS at 16:58

## 2018-03-07 RX ADMIN — ROCURONIUM BROMIDE 40 MG: 10 INJECTION INTRAVENOUS at 17:01

## 2018-03-07 RX ADMIN — FENTANYL CITRATE 50 MCG: 50 INJECTION, SOLUTION INTRAMUSCULAR; INTRAVENOUS at 17:01

## 2018-03-07 RX ADMIN — ONDANSETRON 4 MG: 2 INJECTION INTRAMUSCULAR; INTRAVENOUS at 17:35

## 2018-03-07 RX ADMIN — SODIUM CHLORIDE, POTASSIUM CHLORIDE, SODIUM LACTATE AND CALCIUM CHLORIDE: 600; 310; 30; 20 INJECTION, SOLUTION INTRAVENOUS at 17:50

## 2018-03-07 RX ADMIN — DEXAMETHASONE SODIUM PHOSPHATE 8 MG: 4 INJECTION INTRA-ARTICULAR; INTRALESIONAL; INTRAMUSCULAR; INTRAVENOUS; SOFT TISSUE at 17:00

## 2018-03-07 RX ADMIN — SUGAMMADEX 200 MG: 100 INJECTION, SOLUTION INTRAVENOUS at 17:50

## 2018-03-07 RX ADMIN — PROPOFOL 140 MG: 10 INJECTION, EMULSION INTRAVENOUS at 17:01

## 2018-03-07 ASSESSMENT — PULMONARY FUNCTION TESTS
PIF_VALUE: 14
PIF_VALUE: 15
PIF_VALUE: 16
PIF_VALUE: 8
PIF_VALUE: 11
PIF_VALUE: 15
PIF_VALUE: 16
PIF_VALUE: 16
PIF_VALUE: 14
PIF_VALUE: 15
PIF_VALUE: 13
PIF_VALUE: 1
PIF_VALUE: 15
PIF_VALUE: 14
PIF_VALUE: 12
PIF_VALUE: 15
PIF_VALUE: 14
PIF_VALUE: 8
PIF_VALUE: 15
PIF_VALUE: 16
PIF_VALUE: 14
PIF_VALUE: 14
PIF_VALUE: 15
PIF_VALUE: 1
PIF_VALUE: 11
PIF_VALUE: 14
PIF_VALUE: 16
PIF_VALUE: 9
PIF_VALUE: 13
PIF_VALUE: 8
PIF_VALUE: 14
PIF_VALUE: 13
PIF_VALUE: 16
PIF_VALUE: 15
PIF_VALUE: 13
PIF_VALUE: 14
PIF_VALUE: 15
PIF_VALUE: 14
PIF_VALUE: 15
PIF_VALUE: 14
PIF_VALUE: 15
PIF_VALUE: 17
PIF_VALUE: 14
PIF_VALUE: 15
PIF_VALUE: 15
PIF_VALUE: 16
PIF_VALUE: 17
PIF_VALUE: 8
PIF_VALUE: 15
PIF_VALUE: 8
PIF_VALUE: 16
PIF_VALUE: 14
PIF_VALUE: 16
PIF_VALUE: 12
PIF_VALUE: 28

## 2018-03-07 ASSESSMENT — PAIN DESCRIPTION - DESCRIPTORS: DESCRIPTORS: ACHING

## 2018-03-07 ASSESSMENT — PAIN SCALES - GENERAL
PAINLEVEL_OUTOF10: 0

## 2018-03-07 ASSESSMENT — PAIN - FUNCTIONAL ASSESSMENT: PAIN_FUNCTIONAL_ASSESSMENT: 0-10

## 2018-03-07 NOTE — ANESTHESIA PRE PROCEDURE
Dose Route Frequency Provider Last Rate Last Dose    lactated ringers infusion   Intravenous Continuous Moe Good Thunder, NP        lidocaine PF 1 % injection 1 mL  1 mL Intradermal Once PRN Moe Good Thunder, NP        sodium chloride flush 0.9 % injection 10 mL  10 mL Intravenous 2 times per day Moe Good Thunder, NP        sodium chloride flush 0.9 % injection 10 mL  10 mL Intravenous PRN Moe Good Thunder, NP        ceFAZolin (ANCEF) 2 g in dextrose 3 % 50 mL IVPB (duplex)  2 g Intravenous Once Moe Good Thunder, NP        lactated ringers infusion   Intravenous Continuous Moe Good Thunder, NP 50 mL/hr at 03/07/18 1359 50 mL/hr at 03/07/18 1359       Allergies:     Allergies   Allergen Reactions    Hydromorphone Other (See Comments)    Dilaudid [Fd&C Blue #1 Al Cornejo-Hydromorphone] Other (See Comments)     crazziness       Problem List:    Patient Active Problem List   Diagnosis Code    Anxiety F41.9    Hypertension I10    GERD (gastroesophageal reflux disease) K21.9    Elevated cholesterol E78.00    Chronic back pain M54.9, G89.29    Osteopenia, senile M85.80    Degenerative arthritis of lumbar spine M47.816    Acute encephalopathy G93.40    Atrial fibrillation with RVR (HCC) I48.91    Bilateral nonexudative age-related macular degeneration H35.3130    Degenerative spondylolisthesis M43.10    Gait disturbance R26.9    Fibromyalgia M79.7    Lens replaced by other means Z96.1    Lumbago M54.5    Lumbar radicular pain M54.16    Lumbar spinal stenosis M48.061    Muscle weakness (generalized) M62.81    MGD (meibomian gland dysfunction) H02.89    Nonexudative senile macular degeneration of retina H35.3190    Primary open angle glaucoma H40.1190    Unspecified ptosis of eyelid H02.409    Spinal stenosis of lumbar region with radiculopathy M48.061, M54.16    Senile cataract H25.9    Polypharmacy Z79.899    Atrial fibrillation (HCC) I48.91    Death of  Z63.4    Fear 4.81 05/07/2012    HGB 15.7 03/06/2018    HCT 47.2 03/06/2018    MCV 98.0 03/06/2018    RDW 13.9 03/06/2018     03/06/2018       CMP:   Lab Results   Component Value Date     03/06/2018    K 4.3 03/06/2018    CL 99 03/06/2018    CO2 28 03/06/2018    BUN 13 03/06/2018    CREATININE 0.58 03/06/2018    GFRAA >60.0 03/06/2018    LABGLOM >60.0 03/06/2018    GLUCOSE 108 03/06/2018    GLUCOSE 107 05/07/2012    PROT 6.9 11/25/2017    CALCIUM 9.7 03/06/2018    BILITOT 0.8 11/25/2017    ALKPHOS 78 11/25/2017    AST 25 11/25/2017    ALT 15 11/25/2017       POC Tests: No results for input(s): POCGLU, POCNA, POCK, POCCL, POCBUN, POCHEMO, POCHCT in the last 72 hours.     Coags:   Lab Results   Component Value Date    PROTIME 10.8 03/06/2018    PROTIME 10.0 05/05/2012    INR 1.0 03/06/2018       HCG (If Applicable): No results found for: PREGTESTUR, PREGSERUM, HCG, HCGQUANT     ABGs:   Lab Results   Component Value Date    PHART 7.399 11/25/2017    PO2ART 88 11/25/2017    BUY4CYK 47 11/25/2017    SCM3MXY 28.9 11/25/2017    BEART 4 11/25/2017    G9AEANLI 97 11/25/2017        Type & Screen (If Applicable):  No results found for: LABABO, [address]    Anesthesia Evaluation  Patient summary reviewed and Nursing notes reviewed no history of anesthetic complications:   Airway: Mallampati: II  TM distance: >3 FB   Neck ROM: full  Mouth opening: > = 3 FB Dental: normal exam         Pulmonary:Negative Pulmonary ROS and normal exam  breath sounds clear to auscultation                             Cardiovascular:  Exercise tolerance: good (>4 METS),   (+) hypertension:, dysrhythmias: atrial fibrillation,       ECG reviewed  Rhythm: regular  Rate: normal           Beta Blocker:  Dose within 24 Hrs      ROS comment: Normal sinus rhythm  Possible Left atrial enlargement  Left ventricular hypertrophy  Nonspecific T wave abnormality  Abnormal ECG  No previous ECGs available     Neuro/Psych:   (+) psychiatric history: GI/Hepatic/Renal:   (+) GERD:,           Endo/Other: Negative Endo/Other ROS             Pt had PAT visit. Abdominal:           Vascular:                                        Anesthesia Plan      general     ASA 3     (ETT  Discussed risk of post operative visual disturbances, facial trauma, facial edema and pressure sores)  Induction: intravenous. MIPS: Postoperative opioids intended and Prophylactic antiemetics administered. Anesthetic plan and risks discussed with patient. Plan discussed with CRNA.     Attending anesthesiologist reviewed and agrees with Clemente Cuellar DO   3/7/2018

## 2018-03-07 NOTE — H&P
AP/LAT/SWIMMERS  / ACCESSION #  [id number]    PROCEDURE REASON: multiple diagnoses         * * * * Physician Interpretation * * * *     HISTORY:  Radiculopathy, lumbar region.  Compression fracture of T11/12   vertebra. COMPARISON STUDY: Lumbar spine series dated 07/09/2015. FINDINGS:  2 views of the thoracic spine and 3 views of the lumbar spine.    For counting reference purposes, L4-5 is considered to be at the level   of the iliac crests and L5-S1 is the last visible intervertebral disc   space on the lateral view.  Moderate dextroconvex curvature of the   thoracolumbar spine.  There is an exaggerated thoracic kyphosis centered   at the lower thoracic spine.  There is grade 1 anterolisthesis of L4 on   L5 that is reasonably stable.  There is new retrolisthesis of L3 on L4.    Stable retrolisthesis of L2 on L3.  New retrolisthesis of L1 on L2.    Decreased disc height at every lumbar level.  Endplate spondylosis is   present. Interval development of a moderate anterior wedge compression deformity   of T12.  No fracture line is evident on this exam.      XR LUMBAR GENERAL 3V AP/LAT/L5-S112/15/2017  [facility]  Result Impression   IMPRESSION:    1. MODERATE WEDGE COMPRESSION FRACTURE OF T12 HAS DEVELOPED IN THE   INTERVAL. 2.  EXAGGERATED THORACIC KYPHOSIS CENTERED AROUND THE COMPRESSION   DEFORMITY. 3.  NEW GRADE 1 RETROLISTHESIS AT L1/2 AND L3/4.           : GIULIANA    Transcribe Date/Time: Dec 16 2017 10:31P    Dictated by : Dalila Eller MD    This examination was interpreted and the report reviewed and   electronically signed by:   Dalila Eller MD on Dec 16 2017 10:39PM  EST   Result Narrative   * * *Final Report* * *    DATE OF EXAM: Dec 15 2017  2:27PM      LNX   7373  -  RC LUMBAR 3V AP/LAT/L5-S1  / ACCESSION #  [id number]    PROCEDURE REASON: multiple diagnoses         * * * * Physician Interpretation * * * *     HISTORY:  Radiculopathy, lumbar region.  Compression

## 2018-03-12 RX ORDER — LANSOPRAZOLE 30 MG/1
CAPSULE, DELAYED RELEASE ORAL
Qty: 90 CAPSULE | Refills: 3 | Status: SHIPPED | OUTPATIENT
Start: 2018-03-12 | End: 2018-12-10 | Stop reason: SDUPTHER

## 2018-03-27 DIAGNOSIS — M47.896 OTHER OSTEOARTHRITIS OF SPINE, LUMBAR REGION: ICD-10-CM

## 2018-03-27 RX ORDER — IBUPROFEN 800 MG/1
TABLET ORAL
Qty: 30 TABLET | Refills: 3 | Status: SHIPPED | OUTPATIENT
Start: 2018-03-27 | End: 2018-06-05 | Stop reason: SDUPTHER

## 2018-04-23 RX ORDER — ALPRAZOLAM 0.25 MG/1
0.25 TABLET ORAL NIGHTLY PRN
Qty: 30 TABLET | Refills: 1 | Status: SHIPPED | OUTPATIENT
Start: 2018-05-23 | End: 2018-06-05 | Stop reason: SDUPTHER

## 2018-06-05 ENCOUNTER — OFFICE VISIT (OUTPATIENT)
Dept: INTERNAL MEDICINE CLINIC | Age: 83
End: 2018-06-05
Payer: MEDICARE

## 2018-06-05 ENCOUNTER — HOSPITAL ENCOUNTER (OUTPATIENT)
Dept: GENERAL RADIOLOGY | Age: 83
Discharge: HOME | End: 2018-06-07
Payer: MEDICARE

## 2018-06-05 VITALS
SYSTOLIC BLOOD PRESSURE: 134 MMHG | HEART RATE: 68 BPM | WEIGHT: 127.2 LBS | OXYGEN SATURATION: 97 % | BODY MASS INDEX: 22.54 KG/M2 | DIASTOLIC BLOOD PRESSURE: 84 MMHG | RESPIRATION RATE: 16 BRPM | HEIGHT: 63 IN | TEMPERATURE: 98.5 F

## 2018-06-05 DIAGNOSIS — M47.896 OTHER OSTEOARTHRITIS OF SPINE, LUMBAR REGION: ICD-10-CM

## 2018-06-05 DIAGNOSIS — F41.9 ANXIETY: ICD-10-CM

## 2018-06-05 DIAGNOSIS — K21.9 GASTROESOPHAGEAL REFLUX DISEASE WITHOUT ESOPHAGITIS: ICD-10-CM

## 2018-06-05 DIAGNOSIS — M80.00XS AGE-RELATED OSTEOPOROSIS WITH CURRENT PATHOLOGICAL FRACTURE, SEQUELA: Primary | ICD-10-CM

## 2018-06-05 PROCEDURE — 99213 OFFICE O/P EST LOW 20 MIN: CPT | Performed by: PHYSICIAN ASSISTANT

## 2018-06-05 PROCEDURE — 72110 X-RAY EXAM L-2 SPINE 4/>VWS: CPT

## 2018-06-05 RX ORDER — TRAMADOL HYDROCHLORIDE 50 MG/1
50 TABLET ORAL 2 TIMES DAILY PRN
Qty: 60 TABLET | Refills: 3 | Status: SHIPPED | OUTPATIENT
Start: 2018-06-05 | End: 2022-02-08 | Stop reason: SDUPTHER

## 2018-06-05 RX ORDER — RANITIDINE 150 MG/1
150 TABLET ORAL 2 TIMES DAILY
Qty: 60 TABLET | Refills: 5 | Status: SHIPPED | OUTPATIENT
Start: 2018-06-05 | End: 2018-12-10 | Stop reason: SDUPTHER

## 2018-06-05 RX ORDER — ALPRAZOLAM 0.25 MG/1
0.25 TABLET ORAL NIGHTLY PRN
Qty: 30 TABLET | Refills: 1 | Status: SHIPPED | OUTPATIENT
Start: 2018-06-05 | End: 2018-07-05

## 2018-06-05 RX ORDER — GABAPENTIN 300 MG/1
300 CAPSULE ORAL NIGHTLY
Qty: 30 CAPSULE | Refills: 5 | Status: SHIPPED | OUTPATIENT
Start: 2018-06-05 | End: 2019-02-15 | Stop reason: ALTCHOICE

## 2018-06-05 RX ORDER — IBANDRONATE SODIUM 150 MG/1
150 TABLET, FILM COATED ORAL
Qty: 4 TABLET | Refills: 3 | Status: SHIPPED | OUTPATIENT
Start: 2018-06-05 | End: 2018-12-10 | Stop reason: SDUPTHER

## 2018-06-05 RX ORDER — IBUPROFEN 800 MG/1
TABLET ORAL
Qty: 30 TABLET | Refills: 3 | Status: SHIPPED | OUTPATIENT
Start: 2018-06-05 | End: 2019-03-20 | Stop reason: SDUPTHER

## 2018-06-05 ASSESSMENT — PATIENT HEALTH QUESTIONNAIRE - PHQ9
SUM OF ALL RESPONSES TO PHQ QUESTIONS 1-9: 0
2. FEELING DOWN, DEPRESSED OR HOPELESS: 0
SUM OF ALL RESPONSES TO PHQ9 QUESTIONS 1 & 2: 0
1. LITTLE INTEREST OR PLEASURE IN DOING THINGS: 0

## 2018-06-05 ASSESSMENT — ENCOUNTER SYMPTOMS
NAUSEA: 0
BLURRED VISION: 0
DIARRHEA: 0
ABDOMINAL PAIN: 0
WHEEZING: 0
SHORTNESS OF BREATH: 0
HEARTBURN: 0
BACK PAIN: 1
SORE THROAT: 0
CONSTIPATION: 0
VOMITING: 0
COUGH: 0

## 2018-06-07 ENCOUNTER — OFFICE VISIT (OUTPATIENT)
Dept: INTERNAL MEDICINE CLINIC | Age: 83
End: 2018-06-07
Payer: MEDICARE

## 2018-06-07 VITALS
HEART RATE: 84 BPM | RESPIRATION RATE: 16 BRPM | TEMPERATURE: 97.8 F | DIASTOLIC BLOOD PRESSURE: 86 MMHG | HEIGHT: 63 IN | SYSTOLIC BLOOD PRESSURE: 136 MMHG | OXYGEN SATURATION: 97 %

## 2018-06-07 DIAGNOSIS — F43.23 ADJUSTMENT REACTION WITH ANXIETY AND DEPRESSION: Primary | ICD-10-CM

## 2018-06-07 PROCEDURE — 99213 OFFICE O/P EST LOW 20 MIN: CPT | Performed by: PHYSICIAN ASSISTANT

## 2018-06-07 RX ORDER — DULOXETIN HYDROCHLORIDE 30 MG/1
30 CAPSULE, DELAYED RELEASE ORAL NIGHTLY
Qty: 30 CAPSULE | Refills: 3 | Status: SHIPPED | OUTPATIENT
Start: 2018-06-07 | End: 2018-12-22 | Stop reason: ALTCHOICE

## 2018-06-07 ASSESSMENT — ENCOUNTER SYMPTOMS
HEARTBURN: 0
CONSTIPATION: 0
WHEEZING: 0
SORE THROAT: 0
NAUSEA: 0
COUGH: 0
VOMITING: 0
SHORTNESS OF BREATH: 0
BACK PAIN: 0
BLURRED VISION: 0
DIARRHEA: 0
ABDOMINAL PAIN: 0

## 2018-06-26 RX ORDER — LISINOPRIL 10 MG/1
TABLET ORAL
Qty: 90 TABLET | Refills: 3 | Status: SHIPPED | OUTPATIENT
Start: 2018-06-26 | End: 2019-02-15 | Stop reason: SDUPTHER

## 2018-08-07 ENCOUNTER — OFFICE VISIT (OUTPATIENT)
Dept: INTERNAL MEDICINE CLINIC | Age: 83
End: 2018-08-07
Payer: MEDICARE

## 2018-08-07 VITALS
OXYGEN SATURATION: 96 % | HEIGHT: 62 IN | TEMPERATURE: 98 F | DIASTOLIC BLOOD PRESSURE: 76 MMHG | RESPIRATION RATE: 16 BRPM | WEIGHT: 130.8 LBS | HEART RATE: 68 BPM | BODY MASS INDEX: 24.07 KG/M2 | SYSTOLIC BLOOD PRESSURE: 126 MMHG

## 2018-08-07 DIAGNOSIS — F43.23 ADJUSTMENT DISORDER WITH MIXED ANXIETY AND DEPRESSED MOOD: Primary | ICD-10-CM

## 2018-08-07 DIAGNOSIS — M47.26 OSTEOARTHRITIS OF SPINE WITH RADICULOPATHY, LUMBAR REGION: ICD-10-CM

## 2018-08-07 DIAGNOSIS — F99 INSOMNIA DUE TO OTHER MENTAL DISORDER: ICD-10-CM

## 2018-08-07 DIAGNOSIS — F51.05 INSOMNIA DUE TO OTHER MENTAL DISORDER: ICD-10-CM

## 2018-08-07 PROCEDURE — 99213 OFFICE O/P EST LOW 20 MIN: CPT | Performed by: PHYSICIAN ASSISTANT

## 2018-08-07 RX ORDER — DULOXETIN HYDROCHLORIDE 60 MG/1
60 CAPSULE, DELAYED RELEASE ORAL DAILY
Qty: 30 CAPSULE | Refills: 5 | Status: SHIPPED | OUTPATIENT
Start: 2018-08-07 | End: 2018-12-22 | Stop reason: ALTCHOICE

## 2018-08-07 ASSESSMENT — ENCOUNTER SYMPTOMS
VOMITING: 0
CONSTIPATION: 0
BLURRED VISION: 0
NAUSEA: 0
ABDOMINAL PAIN: 0
WHEEZING: 0
COUGH: 0
DIARRHEA: 0
BACK PAIN: 0
SHORTNESS OF BREATH: 0
HEARTBURN: 1
SORE THROAT: 0

## 2018-08-07 NOTE — PROGRESS NOTES
Subjective  Carlin Engel, [de-identified] y.o. female presents today with:  Chief Complaint   Patient presents with    Depression     2 month follow up. Patient states is doing okay and the cymbalta is helping. HPI  Patient is here for follow-up on her depression after re-starting Cymbalta. She is doing better. However, she feels at fault that she was not there for her daughter when she . She has difficulty falling asleep at night due to racing thoughts. She does feel rested in the morning. She is having less back pain, although she still has some radiation of numbness to her legs. No falls. She has been staying quite active  she still uses the Ativan periodically prn  Review of Systems   Constitutional: Negative for malaise/fatigue. HENT: Negative for congestion, ear pain and sore throat. Eyes: Negative for blurred vision. Respiratory: Negative for cough, shortness of breath and wheezing. Cardiovascular: Negative for chest pain. Gastrointestinal: Positive for heartburn. Negative for abdominal pain, constipation, diarrhea, nausea and vomiting. Genitourinary: Negative for dysuria and frequency. Musculoskeletal: Negative for back pain, joint pain and neck pain. Skin: Negative for itching and rash. Neurological: Negative for dizziness and headaches. Endo/Heme/Allergies: Negative for environmental allergies. Does not bruise/bleed easily. Psychiatric/Behavioral: Positive for depression. Negative for suicidal ideas. The patient has insomnia. The patient is not nervous/anxious.           Past Medical History:   Diagnosis Date    Anxiety     Arthritis     Chronic back pain     DEGENERATIVE BACK    Depression     Distal radial fracture     GERD (gastroesophageal reflux disease)     Glaucoma     History of mammography, screening  CAT 2 BILAT    History of osteopenia     Hypertension     meds > 8 yrs / denies TIA or stroke    Polycythemia vera(238.4)      Past Surgical (around 2/7/2019).   Suggest taking pavel and   Julissa Baeza PA-C

## 2018-10-10 ENCOUNTER — OFFICE VISIT (OUTPATIENT)
Dept: INTERNAL MEDICINE CLINIC | Age: 83
End: 2018-10-10
Payer: MEDICARE

## 2018-10-10 VITALS
RESPIRATION RATE: 16 BRPM | HEART RATE: 83 BPM | DIASTOLIC BLOOD PRESSURE: 84 MMHG | BODY MASS INDEX: 26.35 KG/M2 | SYSTOLIC BLOOD PRESSURE: 134 MMHG | TEMPERATURE: 98.7 F | HEIGHT: 60 IN | WEIGHT: 134.2 LBS | OXYGEN SATURATION: 96 %

## 2018-10-10 DIAGNOSIS — K21.9 GASTROESOPHAGEAL REFLUX DISEASE WITHOUT ESOPHAGITIS: ICD-10-CM

## 2018-10-10 DIAGNOSIS — F33.41 RECURRENT MAJOR DEPRESSIVE DISORDER, IN PARTIAL REMISSION (HCC): ICD-10-CM

## 2018-10-10 DIAGNOSIS — I10 ESSENTIAL HYPERTENSION: ICD-10-CM

## 2018-10-10 DIAGNOSIS — R05.9 COUGH: Primary | ICD-10-CM

## 2018-10-10 DIAGNOSIS — M47.816 SPONDYLOSIS OF LUMBAR REGION WITHOUT MYELOPATHY OR RADICULOPATHY: ICD-10-CM

## 2018-10-10 PROCEDURE — 99214 OFFICE O/P EST MOD 30 MIN: CPT | Performed by: PHYSICIAN ASSISTANT

## 2018-10-10 RX ORDER — CETIRIZINE HYDROCHLORIDE 10 MG/1
10 TABLET ORAL NIGHTLY PRN
Qty: 30 TABLET | Refills: 3 | Status: SHIPPED | OUTPATIENT
Start: 2018-10-10 | End: 2018-12-13 | Stop reason: SDUPTHER

## 2018-10-10 ASSESSMENT — ENCOUNTER SYMPTOMS
ABDOMINAL PAIN: 0
HEARTBURN: 1
CONSTIPATION: 0
WHEEZING: 0
SHORTNESS OF BREATH: 0
SORE THROAT: 0
BLURRED VISION: 0
COUGH: 1
BACK PAIN: 0
NAUSEA: 0
VOMITING: 0
DIARRHEA: 0

## 2018-10-10 NOTE — PROGRESS NOTES
Subjective  Carlin Engel, 82 y.o. female presents today with:  Chief Complaint   Patient presents with    Cough     Patient c/o cough x1 year. Patient states its whenever she gets into any kind of cold weather/air conditioning.  Heartburn     Patient c/o heartburn for awhile. Patient states she is taking her medication. HPI  Patient here along with starting with complaint of cough/ clearing of the throat when exposed to cold air, air conditioning, or if he also notices an outside when doing her gardening  Also complaining of heartburn, particularly when lying down improves if she has a small meal or snack before bed- decreased appetite  C.o low back pain - legs feel weak. Denies falls. Refuses to use walker. Refuses physical therapy  Doing well. Still on Cymbalta  Review of Systems   Constitutional: Negative for malaise/fatigue. HENT: Negative for congestion, ear pain and sore throat. Eyes: Negative for blurred vision. Respiratory: Positive for cough. Negative for shortness of breath and wheezing. Cardiovascular: Negative for chest pain. Gastrointestinal: Positive for heartburn. Negative for abdominal pain, constipation, diarrhea, nausea and vomiting. Genitourinary: Negative for dysuria and frequency. Musculoskeletal: Negative for back pain, joint pain and neck pain. Skin: Negative for itching and rash. Neurological: Negative for dizziness and headaches. Endo/Heme/Allergies: Does not bruise/bleed easily. Psychiatric/Behavioral: Positive for depression. The patient does not have insomnia.           Past Medical History:   Diagnosis Date    Anxiety     Arthritis     Chronic back pain     DEGENERATIVE BACK    Depression     Distal radial fracture 2012    GERD (gastroesophageal reflux disease)     Glaucoma     History of mammography, screening 2011 CAT 2 BILAT    History of osteopenia     Hypertension     meds > 8 yrs / denies TIA or stroke    Lumbar radicular pain    

## 2018-11-21 DIAGNOSIS — E88.09 HYPERPROTEINEMIA: ICD-10-CM

## 2018-11-21 DIAGNOSIS — I10 ESSENTIAL HYPERTENSION: Primary | ICD-10-CM

## 2018-11-21 DIAGNOSIS — E87.5 HYPERKALEMIA: ICD-10-CM

## 2018-11-21 DIAGNOSIS — I10 ESSENTIAL HYPERTENSION: ICD-10-CM

## 2018-11-21 LAB
ALBUMIN SERPL-MCNC: 4.8 G/DL (ref 3.9–4.9)
ALP BLD-CCNC: 76 U/L (ref 40–130)
ALT SERPL-CCNC: 14 U/L (ref 0–33)
ANION GAP SERPL CALCULATED.3IONS-SCNC: 13 MEQ/L (ref 7–13)
AST SERPL-CCNC: 23 U/L (ref 0–35)
BASOPHILS ABSOLUTE: 0.1 K/UL (ref 0–0.2)
BASOPHILS RELATIVE PERCENT: 0.8 %
BILIRUB SERPL-MCNC: 0.8 MG/DL (ref 0–1.2)
BUN BLDV-MCNC: 17 MG/DL (ref 8–23)
CALCIUM SERPL-MCNC: 10 MG/DL (ref 8.6–10.2)
CHLORIDE BLD-SCNC: 97 MEQ/L (ref 98–107)
CHOLESTEROL, FASTING: 225 MG/DL (ref 0–199)
CO2: 31 MEQ/L (ref 22–29)
CREAT SERPL-MCNC: 0.94 MG/DL (ref 0.5–0.9)
EOSINOPHILS ABSOLUTE: 0.1 K/UL (ref 0–0.7)
EOSINOPHILS RELATIVE PERCENT: 0.8 %
GFR AFRICAN AMERICAN: >60
GFR NON-AFRICAN AMERICAN: 57.1
GLOBULIN: 3.7 G/DL (ref 2.3–3.5)
GLUCOSE BLD-MCNC: 100 MG/DL (ref 74–109)
HCT VFR BLD CALC: 50.9 % (ref 37–47)
HDLC SERPL-MCNC: 90 MG/DL (ref 40–59)
HEMOGLOBIN: 17.3 G/DL (ref 12–16)
LDL CHOLESTEROL CALCULATED: 117 MG/DL (ref 0–129)
LYMPHOCYTES ABSOLUTE: 1.5 K/UL (ref 1–4.8)
LYMPHOCYTES RELATIVE PERCENT: 20.1 %
MCH RBC QN AUTO: 33 PG (ref 27–31.3)
MCHC RBC AUTO-ENTMCNC: 33.9 % (ref 33–37)
MCV RBC AUTO: 97.2 FL (ref 82–100)
MONOCYTES ABSOLUTE: 0.7 K/UL (ref 0.2–0.8)
MONOCYTES RELATIVE PERCENT: 9.4 %
NEUTROPHILS ABSOLUTE: 5.2 K/UL (ref 1.4–6.5)
NEUTROPHILS RELATIVE PERCENT: 68.9 %
PDW BLD-RTO: 13.8 % (ref 11.5–14.5)
PLATELET # BLD: 269 K/UL (ref 130–400)
POTASSIUM SERPL-SCNC: 5.2 MEQ/L (ref 3.5–5.1)
RBC # BLD: 5.24 M/UL (ref 4.2–5.4)
SODIUM BLD-SCNC: 141 MEQ/L (ref 132–144)
TOTAL PROTEIN: 8.5 G/DL (ref 6.4–8.1)
TRIGLYCERIDE, FASTING: 88 MG/DL (ref 0–200)
WBC # BLD: 7.5 K/UL (ref 4.8–10.8)

## 2018-11-21 RX ORDER — POTASSIUM CHLORIDE 1.5 G/1.77G
POWDER, FOR SOLUTION ORAL
Qty: 30 EACH | Refills: 0
Start: 2018-11-21 | End: 2019-02-15

## 2018-11-30 ENCOUNTER — HOSPITAL ENCOUNTER (EMERGENCY)
Age: 83
Discharge: ANOTHER HOSPITAL | End: 2018-12-01
Payer: MEDICARE

## 2018-11-30 ENCOUNTER — APPOINTMENT (OUTPATIENT)
Dept: CT IMAGING | Age: 83
End: 2018-11-30
Payer: MEDICARE

## 2018-11-30 DIAGNOSIS — J90 PLEURAL EFFUSION: ICD-10-CM

## 2018-11-30 DIAGNOSIS — R42 DIZZINESS: ICD-10-CM
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
## 2018-11-30 DIAGNOSIS — S22.42XA CLOSED FRACTURE OF MULTIPLE RIBS OF LEFT SIDE, INITIAL ENCOUNTER: Primary | ICD-10-CM

## 2018-11-30 LAB
ALBUMIN SERPL-MCNC: 4.1 G/DL (ref 3.9–4.9)
ALP BLD-CCNC: 76 U/L (ref 40–130)
ALT SERPL-CCNC: 16 U/L (ref 0–33)
ANION GAP SERPL CALCULATED.3IONS-SCNC: 13 MEQ/L (ref 7–13)
APTT: 23.8 SEC (ref 21.6–35.4)
AST SERPL-CCNC: 25 U/L (ref 0–35)
BASOPHILS ABSOLUTE: 0.1 K/UL (ref 0–0.2)
BASOPHILS RELATIVE PERCENT: 0.7 %
BILIRUB SERPL-MCNC: 0.4 MG/DL (ref 0–1.2)
BILIRUBIN URINE: NEGATIVE
BLOOD, URINE: NEGATIVE
BUN BLDV-MCNC: 22 MG/DL (ref 8–23)
CALCIUM SERPL-MCNC: 10.8 MG/DL (ref 8.6–10.2)
CHLORIDE BLD-SCNC: 97 MEQ/L (ref 98–107)
CLARITY: CLEAR
CO2: 28 MEQ/L (ref 22–29)
COLOR: YELLOW
CREAT SERPL-MCNC: 0.9 MG/DL (ref 0.5–0.9)
EKG ATRIAL RATE: 79 BPM
EKG P AXIS: 52 DEGREES
EKG P-R INTERVAL: 136 MS
EKG Q-T INTERVAL: 382 MS
EKG QRS DURATION: 90 MS
EKG QTC CALCULATION (BAZETT): 438 MS
EKG R AXIS: -8 DEGREES
EKG T AXIS: 35 DEGREES
EKG VENTRICULAR RATE: 79 BPM
EOSINOPHILS ABSOLUTE: 0.1 K/UL (ref 0–0.7)
EOSINOPHILS RELATIVE PERCENT: 1.1 %
GFR AFRICAN AMERICAN: >60
GFR NON-AFRICAN AMERICAN: >60
GLOBULIN: 3 G/DL (ref 2.3–3.5)
GLUCOSE BLD-MCNC: 119 MG/DL (ref 74–109)
GLUCOSE URINE: NEGATIVE MG/DL
HCT VFR BLD CALC: 45.6 % (ref 37–47)
HEMOGLOBIN: 15.4 G/DL (ref 12–16)
INR BLD: 1
KETONES, URINE: NEGATIVE MG/DL
LEUKOCYTE ESTERASE, URINE: NEGATIVE
LYMPHOCYTES ABSOLUTE: 2 K/UL (ref 1–4.8)
LYMPHOCYTES RELATIVE PERCENT: 18.1 %
MAGNESIUM: 2.3 MG/DL (ref 1.7–2.3)
MCH RBC QN AUTO: 32.9 PG (ref 27–31.3)
MCHC RBC AUTO-ENTMCNC: 33.7 % (ref 33–37)
MCV RBC AUTO: 97.6 FL (ref 82–100)
MONOCYTES ABSOLUTE: 1.3 K/UL (ref 0.2–0.8)
MONOCYTES RELATIVE PERCENT: 12 %
NEUTROPHILS ABSOLUTE: 7.6 K/UL (ref 1.4–6.5)
NEUTROPHILS RELATIVE PERCENT: 68.1 %
NITRITE, URINE: NEGATIVE
PDW BLD-RTO: 13.5 % (ref 11.5–14.5)
PH UA: 6.5 (ref 5–9)
PLATELET # BLD: 241 K/UL (ref 130–400)
POTASSIUM SERPL-SCNC: 3.8 MEQ/L (ref 3.5–5.1)
PROTEIN UA: NEGATIVE MG/DL
PROTHROMBIN TIME: 10.2 SEC (ref 9.6–12.3)
RBC # BLD: 4.67 M/UL (ref 4.2–5.4)
SODIUM BLD-SCNC: 138 MEQ/L (ref 132–144)
SPECIFIC GRAVITY UA: 1.02 (ref 1–1.03)
TOTAL PROTEIN: 7.1 G/DL (ref 6.4–8.1)
TROPONIN: <0.01 NG/ML (ref 0–0.01)
URINE REFLEX TO CULTURE: NORMAL
UROBILINOGEN, URINE: 0.2 E.U./DL
WBC # BLD: 11.1 K/UL (ref 4.8–10.8)

## 2018-11-30 PROCEDURE — 81003 URINALYSIS AUTO W/O SCOPE: CPT

## 2018-11-30 PROCEDURE — 96375 TX/PRO/DX INJ NEW DRUG ADDON: CPT

## 2018-11-30 PROCEDURE — 80053 COMPREHEN METABOLIC PANEL: CPT

## 2018-11-30 PROCEDURE — 85610 PROTHROMBIN TIME: CPT

## 2018-11-30 PROCEDURE — 93005 ELECTROCARDIOGRAM TRACING: CPT

## 2018-11-30 PROCEDURE — 99285 EMERGENCY DEPT VISIT HI MDM: CPT

## 2018-11-30 PROCEDURE — 85025 COMPLETE CBC W/AUTO DIFF WBC: CPT

## 2018-11-30 PROCEDURE — 84484 ASSAY OF TROPONIN QUANT: CPT

## 2018-11-30 PROCEDURE — 36415 COLL VENOUS BLD VENIPUNCTURE: CPT

## 2018-11-30 PROCEDURE — 85730 THROMBOPLASTIN TIME PARTIAL: CPT

## 2018-11-30 PROCEDURE — 96374 THER/PROPH/DIAG INJ IV PUSH: CPT

## 2018-11-30 PROCEDURE — 83735 ASSAY OF MAGNESIUM: CPT

## 2018-11-30 RX ORDER — ONDANSETRON 2 MG/ML
4 INJECTION INTRAMUSCULAR; INTRAVENOUS ONCE
Status: COMPLETED | OUTPATIENT
Start: 2018-11-30 | End: 2018-12-01

## 2018-11-30 RX ORDER — SODIUM CHLORIDE 0.9 % (FLUSH) 0.9 %
3 SYRINGE (ML) INJECTION EVERY 8 HOURS
Status: DISCONTINUED | OUTPATIENT
Start: 2018-11-30 | End: 2018-12-01 | Stop reason: HOSPADM

## 2018-11-30 RX ORDER — FENTANYL CITRATE 50 UG/ML
12.5 INJECTION, SOLUTION INTRAMUSCULAR; INTRAVENOUS ONCE
Status: COMPLETED | OUTPATIENT
Start: 2018-11-30 | End: 2018-12-01

## 2018-11-30 ASSESSMENT — PAIN DESCRIPTION - PAIN TYPE: TYPE: ACUTE PAIN

## 2018-11-30 ASSESSMENT — PAIN DESCRIPTION - LOCATION: LOCATION: BACK

## 2018-11-30 ASSESSMENT — PAIN DESCRIPTION - DESCRIPTORS: DESCRIPTORS: ACHING

## 2018-11-30 ASSESSMENT — PAIN DESCRIPTION - ORIENTATION: ORIENTATION: LEFT;MID

## 2018-11-30 ASSESSMENT — PAIN SCALES - GENERAL: PAINLEVEL_OUTOF10: 9

## 2018-12-01 ENCOUNTER — APPOINTMENT (OUTPATIENT)
Dept: CT IMAGING | Age: 83
End: 2018-12-01
Payer: MEDICARE

## 2018-12-01 ENCOUNTER — APPOINTMENT (OUTPATIENT)
Dept: GENERAL RADIOLOGY | Age: 83
End: 2018-12-01
Payer: MEDICARE

## 2018-12-01 VITALS
HEIGHT: 63 IN | RESPIRATION RATE: 20 BRPM | TEMPERATURE: 98 F | WEIGHT: 120 LBS | SYSTOLIC BLOOD PRESSURE: 180 MMHG | DIASTOLIC BLOOD PRESSURE: 81 MMHG | OXYGEN SATURATION: 97 % | BODY MASS INDEX: 21.26 KG/M2 | HEART RATE: 80 BPM

## 2018-12-01 LAB
GFR AFRICAN AMERICAN: >60
GFR NON-AFRICAN AMERICAN: 53
PERFORMED ON: ABNORMAL
POC CREATININE: 1 MG/DL (ref 0.6–1.2)
POC SAMPLE TYPE: ABNORMAL

## 2018-12-01 PROCEDURE — 74177 CT ABD & PELVIS W/CONTRAST: CPT

## 2018-12-01 PROCEDURE — S0028 INJECTION, FAMOTIDINE, 20 MG: HCPCS | Performed by: PHYSICIAN ASSISTANT

## 2018-12-01 PROCEDURE — 6360000002 HC RX W HCPCS: Performed by: PHYSICIAN ASSISTANT

## 2018-12-01 PROCEDURE — 71260 CT THORAX DX C+: CPT

## 2018-12-01 PROCEDURE — 6360000004 HC RX CONTRAST MEDICATION: Performed by: RADIOLOGY

## 2018-12-01 PROCEDURE — 70450 CT HEAD/BRAIN W/O DYE: CPT

## 2018-12-01 PROCEDURE — 2580000003 HC RX 258: Performed by: PHYSICIAN ASSISTANT

## 2018-12-01 PROCEDURE — 73552 X-RAY EXAM OF FEMUR 2/>: CPT

## 2018-12-01 PROCEDURE — 2500000003 HC RX 250 WO HCPCS: Performed by: PHYSICIAN ASSISTANT

## 2018-12-01 RX ORDER — 0.9 % SODIUM CHLORIDE 0.9 %
500 INTRAVENOUS SOLUTION INTRAVENOUS ONCE
Status: COMPLETED | OUTPATIENT
Start: 2018-12-01 | End: 2018-12-01

## 2018-12-01 RX ADMIN — FAMOTIDINE 20 MG: 10 INJECTION, SOLUTION INTRAVENOUS at 00:02

## 2018-12-01 RX ADMIN — FENTANYL CITRATE 12.5 MCG: 50 INJECTION, SOLUTION INTRAMUSCULAR; INTRAVENOUS at 00:02

## 2018-12-01 RX ADMIN — ONDANSETRON 4 MG: 2 INJECTION INTRAMUSCULAR; INTRAVENOUS at 00:02

## 2018-12-01 RX ADMIN — SODIUM CHLORIDE 500 ML: 9 INJECTION, SOLUTION INTRAVENOUS at 01:30

## 2018-12-01 RX ADMIN — IOPAMIDOL 100 ML: 612 INJECTION, SOLUTION INTRAVENOUS at 00:19

## 2018-12-01 ASSESSMENT — PAIN DESCRIPTION - LOCATION: LOCATION: BACK

## 2018-12-01 ASSESSMENT — PAIN DESCRIPTION - PAIN TYPE: TYPE: ACUTE PAIN

## 2018-12-01 ASSESSMENT — ENCOUNTER SYMPTOMS
ANAL BLEEDING: 0
COUGH: 0
PHOTOPHOBIA: 0
EYE DISCHARGE: 0
ABDOMINAL DISTENTION: 0
SHORTNESS OF BREATH: 0
VOMITING: 0
VOICE CHANGE: 0
BLOOD IN STOOL: 0
NAUSEA: 0
APNEA: 0
BACK PAIN: 1

## 2018-12-01 ASSESSMENT — PAIN DESCRIPTION - ORIENTATION: ORIENTATION: LEFT

## 2018-12-01 ASSESSMENT — PAIN SCALES - GENERAL: PAINLEVEL_OUTOF10: 7

## 2018-12-01 ASSESSMENT — PAIN DESCRIPTION - DESCRIPTORS: DESCRIPTORS: ACHING;NAGGING

## 2018-12-02 ENCOUNTER — TELEPHONE (OUTPATIENT)
Dept: FAMILY MEDICINE CLINIC | Age: 83
End: 2018-12-02

## 2018-12-03 PROCEDURE — 93010 ELECTROCARDIOGRAM REPORT: CPT | Performed by: INTERNAL MEDICINE

## 2018-12-10 ENCOUNTER — TELEPHONE (OUTPATIENT)
Dept: INTERNAL MEDICINE CLINIC | Age: 83
End: 2018-12-10

## 2018-12-10 ENCOUNTER — OFFICE VISIT (OUTPATIENT)
Dept: INTERNAL MEDICINE CLINIC | Age: 83
End: 2018-12-10
Payer: MEDICARE

## 2018-12-10 VITALS
BODY MASS INDEX: 24.8 KG/M2 | HEIGHT: 62 IN | RESPIRATION RATE: 16 BRPM | OXYGEN SATURATION: 94 % | DIASTOLIC BLOOD PRESSURE: 84 MMHG | WEIGHT: 134.8 LBS | TEMPERATURE: 98.5 F | HEART RATE: 83 BPM | SYSTOLIC BLOOD PRESSURE: 124 MMHG

## 2018-12-10 DIAGNOSIS — I10 ESSENTIAL HYPERTENSION: ICD-10-CM

## 2018-12-10 DIAGNOSIS — M47.896 OTHER OSTEOARTHRITIS OF SPINE, LUMBAR REGION: ICD-10-CM

## 2018-12-10 DIAGNOSIS — H65.93 FLUID LEVEL BEHIND TYMPANIC MEMBRANE OF BOTH EARS: ICD-10-CM

## 2018-12-10 DIAGNOSIS — F41.9 ANXIETY: ICD-10-CM

## 2018-12-10 DIAGNOSIS — R42 DIZZINESS: Primary | ICD-10-CM

## 2018-12-10 DIAGNOSIS — M80.00XS AGE-RELATED OSTEOPOROSIS WITH CURRENT PATHOLOGICAL FRACTURE, SEQUELA: ICD-10-CM

## 2018-12-10 DIAGNOSIS — K21.00 GASTROESOPHAGEAL REFLUX DISEASE WITH ESOPHAGITIS: ICD-10-CM

## 2018-12-10 PROCEDURE — 99214 OFFICE O/P EST MOD 30 MIN: CPT | Performed by: PHYSICIAN ASSISTANT

## 2018-12-10 PROCEDURE — 1111F DSCHRG MED/CURRENT MED MERGE: CPT | Performed by: PHYSICIAN ASSISTANT

## 2018-12-10 RX ORDER — LANSOPRAZOLE 30 MG/1
CAPSULE, DELAYED RELEASE ORAL
Qty: 90 CAPSULE | Refills: 3 | Status: SHIPPED | OUTPATIENT
Start: 2018-12-10 | End: 2019-10-21 | Stop reason: SDUPTHER

## 2018-12-10 RX ORDER — ALPRAZOLAM 0.25 MG/1
0.25 TABLET ORAL NIGHTLY PRN
COMMUNITY
End: 2019-01-11 | Stop reason: SDUPTHER

## 2018-12-10 RX ORDER — RANITIDINE 150 MG/1
150 TABLET ORAL NIGHTLY
Qty: 90 TABLET | Refills: 2 | Status: SHIPPED | OUTPATIENT
Start: 2018-12-10 | End: 2019-07-05 | Stop reason: SDUPTHER

## 2018-12-10 RX ORDER — METOPROLOL SUCCINATE 25 MG/1
1 TABLET, EXTENDED RELEASE ORAL DAILY
COMMUNITY
Start: 2018-12-07 | End: 2019-02-15 | Stop reason: SDUPTHER

## 2018-12-10 RX ORDER — IBANDRONATE SODIUM 150 MG/1
150 TABLET, FILM COATED ORAL
Qty: 4 TABLET | Refills: 3 | Status: SHIPPED | OUTPATIENT
Start: 2018-12-10 | End: 2019-10-07 | Stop reason: SDUPTHER

## 2018-12-10 ASSESSMENT — ENCOUNTER SYMPTOMS
EYES NEGATIVE: 1
SHORTNESS OF BREATH: 0
BACK PAIN: 1
ABDOMINAL PAIN: 0
COUGH: 0

## 2018-12-11 ENCOUNTER — TELEPHONE (OUTPATIENT)
Dept: FAMILY MEDICINE CLINIC | Age: 83
End: 2018-12-11

## 2018-12-11 DIAGNOSIS — M47.26 OSTEOARTHRITIS OF SPINE WITH RADICULOPATHY, LUMBAR REGION: Primary | ICD-10-CM

## 2018-12-11 RX ORDER — LIDOCAINE 50 MG/G
1 PATCH TOPICAL DAILY
Qty: 30 PATCH | Refills: 3 | Status: SHIPPED | OUTPATIENT
Start: 2018-12-11 | End: 2019-01-10

## 2018-12-12 ENCOUNTER — TELEPHONE (OUTPATIENT)
Dept: INTERNAL MEDICINE CLINIC | Age: 83
End: 2018-12-12

## 2018-12-13 DIAGNOSIS — R05.9 COUGH: ICD-10-CM

## 2018-12-13 DIAGNOSIS — M25.50 ARTHRALGIA, UNSPECIFIED JOINT: Primary | ICD-10-CM

## 2018-12-13 RX ORDER — CETIRIZINE HYDROCHLORIDE 10 MG/1
10 TABLET ORAL NIGHTLY PRN
Qty: 30 TABLET | Refills: 3 | Status: SHIPPED | OUTPATIENT
Start: 2018-12-13 | End: 2019-02-15 | Stop reason: ALTCHOICE

## 2018-12-14 ENCOUNTER — TELEPHONE (OUTPATIENT)
Dept: INTERNAL MEDICINE CLINIC | Age: 83
End: 2018-12-14

## 2018-12-14 DIAGNOSIS — R63.0 DECREASED APPETITE: Primary | ICD-10-CM

## 2018-12-14 NOTE — TELEPHONE ENCOUNTER
Pt son called stating that he cannot get the Pt to eat almost anything. He states \"she hasn't eaten a good meal in I don't know how long. She is going to get weaker, not stronger. \" He is asking if there is anything you can prescribe to give her an appetite? Please advise.

## 2018-12-22 ENCOUNTER — OFFICE VISIT (OUTPATIENT)
Dept: INTERNAL MEDICINE CLINIC | Age: 83
End: 2018-12-22
Payer: MEDICARE

## 2018-12-22 VITALS
RESPIRATION RATE: 16 BRPM | TEMPERATURE: 98.4 F | DIASTOLIC BLOOD PRESSURE: 84 MMHG | HEART RATE: 75 BPM | BODY MASS INDEX: 26.7 KG/M2 | SYSTOLIC BLOOD PRESSURE: 134 MMHG | OXYGEN SATURATION: 95 % | HEIGHT: 60 IN | WEIGHT: 136 LBS

## 2018-12-22 DIAGNOSIS — M80.08XS FRACTURE OF VERTEBRA DUE TO OSTEOPOROSIS, SEQUELA: ICD-10-CM

## 2018-12-22 DIAGNOSIS — M62.81 MUSCLE WEAKNESS (GENERALIZED): ICD-10-CM

## 2018-12-22 DIAGNOSIS — R26.9 GAIT DISTURBANCE: Primary | ICD-10-CM

## 2018-12-22 DIAGNOSIS — F02.80 LATE ONSET ALZHEIMER'S DISEASE WITHOUT BEHAVIORAL DISTURBANCE (HCC): ICD-10-CM

## 2018-12-22 DIAGNOSIS — Z91.81 AT HIGH RISK FOR FALLS: ICD-10-CM

## 2018-12-22 DIAGNOSIS — Z78.0 POST-MENOPAUSAL: ICD-10-CM

## 2018-12-22 DIAGNOSIS — M54.50 CHRONIC BILATERAL LOW BACK PAIN WITHOUT SCIATICA: ICD-10-CM

## 2018-12-22 DIAGNOSIS — G30.1 LATE ONSET ALZHEIMER'S DISEASE WITHOUT BEHAVIORAL DISTURBANCE (HCC): ICD-10-CM

## 2018-12-22 DIAGNOSIS — G89.29 CHRONIC BILATERAL LOW BACK PAIN WITHOUT SCIATICA: ICD-10-CM

## 2018-12-22 PROCEDURE — 99214 OFFICE O/P EST MOD 30 MIN: CPT | Performed by: FAMILY MEDICINE

## 2018-12-22 RX ORDER — DONEPEZIL HYDROCHLORIDE 10 MG/1
10 TABLET, FILM COATED ORAL NIGHTLY
Qty: 30 TABLET | Refills: 3 | Status: SHIPPED | OUTPATIENT
Start: 2018-12-22 | End: 2019-02-18 | Stop reason: SDUPTHER

## 2018-12-22 ASSESSMENT — ENCOUNTER SYMPTOMS
RESPIRATORY NEGATIVE: 1
EYES NEGATIVE: 1
CHEST TIGHTNESS: 0
GASTROINTESTINAL NEGATIVE: 1
COUGH: 0
RHINORRHEA: 0

## 2018-12-22 NOTE — PROGRESS NOTES
Patient is seen in follow up for   Chief Complaint   Patient presents with    Cough     Non-productive cough for over 1 month - saw Makeda Machado patient on 12/10/18. Still has some dizziness. HPIHere for follow up on ear fullness she has some numbness. in her hands and has had frqwuent falls with some memory loss    Past Medical History:   Diagnosis Date    Anxiety     Arthritis     Chronic back pain     DEGENERATIVE BACK    Depression     Distal radial fracture 2012    GERD (gastroesophageal reflux disease)     Glaucoma     History of mammography, screening 2011 CAT 2 BILAT    History of osteopenia     Hypertension     meds > 8 yrs / denies TIA or stroke    Lumbar radicular pain     Polycythemia vera(238.4)      Patient Active Problem List    Diagnosis Date Noted    Multiple fractures of thoracic spine ([address] 03/06/2018    Fracture of vertebra due to osteoporosis ([address] 02/22/2018    Somnolence     Acute encephalopathy 11/25/2017    Lumbar spinal stenosis 11/25/2017    Polypharmacy 11/25/2017    Atrial fibrillation ([address] 11/25/2017    Death of  11/25/2017    Fear of being a burden to others 11/25/2017    Mourning 11/25/2017    Bilateral nonexudative age-related macular degeneration 10/31/2017    Muscle weakness (generalized) 06/29/2015    Lumbago 06/16/2015    MGD (meibomian gland dysfunction) 03/06/2015    Nonexudative senile macular degeneration of retina 03/06/2015    Ptosis of eyelid 03/06/2015    Atrial fibrillation with RVR ([address] 02/02/2015    Degenerative spondylolisthesis 01/30/2015    Spinal stenosis of lumbar region with radiculopathy 01/30/2015    Lens replaced by other means 09/05/2014    Primary open angle glaucoma 09/05/2014    Degenerative arthritis of lumbar spine 08/21/2014    Senile cataract 12/20/2013    Gait disturbance 03/05/2012    Fibromyalgia 03/05/2012    Lumbar radicular pain 02/14/2012    Osteopenia, senile 12/28/2011    Chronic back pain empty stomach, and do not take anything else by mouth or lie down for the next 30 minutes. 4 tablet 3    lansoprazole (PREVACID) 30 MG delayed release capsule TAKE ONE CAPSULE BY MOUTH EVERY DAY 90 capsule 3    ranitidine (ZANTAC) 150 MG tablet Take 1 tablet by mouth nightly 90 tablet 2    potassium chloride (KLOR-CON) 20 MEQ packet 1 PO every other day 30 each 0    lisinopril (PRINIVIL;ZESTRIL) 10 MG tablet TAKE ONE TABLET BY MOUTH EVERY DAY 90 tablet 3    ibuprofen (ADVIL;MOTRIN) 800 MG tablet TAKE ONE TABLET BY MOUTH EVERY DAY WITH FOOD AS NEEDED FOR PAIN 30 tablet 3    gabapentin (NEURONTIN) 300 MG capsule Take 1 capsule by mouth nightly for 153 days. . 30 capsule 5    bimatoprost (LUMIGAN) 0.01 % SOLN ophthalmic drops Place 1 drop into both eyes every evening       No current facility-administered medications on file prior to visit. Allergies   Allergen Reactions    Hydromorphone Other (See Comments)    Dilaudid [Fd&C Blue #1 Al Cornejo-Hydromorphone] Other (See Comments)     crazziness     Health Maintenance   Topic Date Due    DTaP/Tdap/Td vaccine (1 - Tdap) 06/05/2019 (Originally 9/25/2012)    Shingles Vaccine (1 of 2 - 2 Dose Series) 06/05/2019 (Originally 11/3/2013)    Potassium monitoring  11/30/2019    Creatinine monitoring  11/30/2019    DEXA (modify frequency per FRAX score)  Completed    Flu vaccine  Completed    Pneumococcal low/med risk  Completed       Review of Systems    Review of Systems   Constitutional: Negative for activity change, appetite change, fatigue and fever. HENT: Negative for congestion and rhinorrhea. Eyes: Negative. Respiratory: Negative. Negative for cough and chest tightness. Cardiovascular: Negative. Gastrointestinal: Negative. Endocrine: Negative. Genitourinary: Negative. Musculoskeletal: Negative. Skin: Negative. Neurological: Positive for dizziness and weakness. Negative for light-headedness and numbness.    Hematological:

## 2018-12-28 ENCOUNTER — TELEPHONE (OUTPATIENT)
Dept: INTERNAL MEDICINE CLINIC | Age: 83
End: 2018-12-28

## 2018-12-30 ENCOUNTER — APPOINTMENT (OUTPATIENT)
Dept: CT IMAGING | Age: 83
End: 2018-12-30
Payer: MEDICARE

## 2018-12-30 ENCOUNTER — HOSPITAL ENCOUNTER (EMERGENCY)
Age: 83
Discharge: ANOTHER HOSPITAL | End: 2018-12-31
Payer: MEDICARE

## 2018-12-30 ENCOUNTER — APPOINTMENT (OUTPATIENT)
Dept: GENERAL RADIOLOGY | Age: 83
End: 2018-12-30
Payer: MEDICARE

## 2018-12-30 DIAGNOSIS — F03.91 DEMENTIA WITH BEHAVIORAL DISTURBANCE, UNSPECIFIED DEMENTIA TYPE: ICD-10-CM

## 2018-12-30 DIAGNOSIS — F32.2 CURRENT SEVERE EPISODE OF MAJOR DEPRESSIVE DISORDER WITHOUT PSYCHOTIC FEATURES, UNSPECIFIED WHETHER RECURRENT (HCC): Primary | ICD-10-CM

## 2018-12-30 LAB
ACETAMINOPHEN LEVEL: <5 UG/ML (ref 10–30)
ALBUMIN SERPL-MCNC: 4.4 G/DL (ref 3.9–4.9)
ALP BLD-CCNC: 87 U/L (ref 40–130)
ALT SERPL-CCNC: 25 U/L (ref 0–33)
AMPHETAMINE SCREEN, URINE: NORMAL
ANION GAP SERPL CALCULATED.3IONS-SCNC: 11 MEQ/L (ref 7–13)
AST SERPL-CCNC: 35 U/L (ref 0–35)
BACTERIA: NORMAL /HPF
BARBITURATE SCREEN URINE: NORMAL
BASOPHILS ABSOLUTE: 0.1 K/UL (ref 0–0.2)
BASOPHILS RELATIVE PERCENT: 1.4 %
BENZODIAZEPINE SCREEN, URINE: NORMAL
BILIRUB SERPL-MCNC: <0.2 MG/DL (ref 0–1.2)
BILIRUBIN URINE: NEGATIVE
BLOOD, URINE: NEGATIVE
BUN BLDV-MCNC: 20 MG/DL (ref 8–23)
CALCIUM SERPL-MCNC: 9.3 MG/DL (ref 8.6–10.2)
CANNABINOID SCREEN URINE: NORMAL
CHLORIDE BLD-SCNC: 99 MEQ/L (ref 98–107)
CK MB: 7.4 NG/ML (ref 0–3.8)
CLARITY: ABNORMAL
CO2: 28 MEQ/L (ref 22–29)
COCAINE METABOLITE SCREEN URINE: NORMAL
COLOR: YELLOW
CREAT SERPL-MCNC: 0.73 MG/DL (ref 0.5–0.9)
CREATINE KINASE-MB INDEX: 2.3 % (ref 0–3.5)
CRYSTALS, UA: NORMAL
EKG ATRIAL RATE: 82 BPM
EKG P AXIS: 63 DEGREES
EKG P-R INTERVAL: 144 MS
EKG Q-T INTERVAL: 386 MS
EKG QRS DURATION: 76 MS
EKG QTC CALCULATION (BAZETT): 450 MS
EKG R AXIS: 19 DEGREES
EKG T AXIS: 56 DEGREES
EKG VENTRICULAR RATE: 82 BPM
EOSINOPHILS ABSOLUTE: 0.3 K/UL (ref 0–0.7)
EOSINOPHILS RELATIVE PERCENT: 3.5 %
EPITHELIAL CELLS, UA: NORMAL /HPF
ETHANOL PERCENT: NORMAL G/DL
ETHANOL: <10 MG/DL (ref 0–0.08)
GFR AFRICAN AMERICAN: >60
GFR NON-AFRICAN AMERICAN: >60
GLOBULIN: 2.5 G/DL (ref 2.3–3.5)
GLUCOSE BLD-MCNC: 111 MG/DL (ref 74–109)
GLUCOSE URINE: NEGATIVE MG/DL
HCT VFR BLD CALC: 45.1 % (ref 37–47)
HEMOGLOBIN: 15.3 G/DL (ref 12–16)
INR BLD: 1
KETONES, URINE: ABNORMAL MG/DL
LEUKOCYTE ESTERASE, URINE: ABNORMAL
LYMPHOCYTES ABSOLUTE: 2.4 K/UL (ref 1–4.8)
LYMPHOCYTES RELATIVE PERCENT: 31.3 %
Lab: NORMAL
MCH RBC QN AUTO: 32.5 PG (ref 27–31.3)
MCHC RBC AUTO-ENTMCNC: 33.9 % (ref 33–37)
MCV RBC AUTO: 95.9 FL (ref 82–100)
MONOCYTES ABSOLUTE: 0.8 K/UL (ref 0.2–0.8)
MONOCYTES RELATIVE PERCENT: 9.8 %
NEUTROPHILS ABSOLUTE: 4.2 K/UL (ref 1.4–6.5)
NEUTROPHILS RELATIVE PERCENT: 54 %
NITRITE, URINE: NEGATIVE
OPIATE SCREEN URINE: NORMAL
PDW BLD-RTO: 13.8 % (ref 11.5–14.5)
PH UA: 5 (ref 5–9)
PHENCYCLIDINE SCREEN URINE: NORMAL
PLATELET # BLD: 266 K/UL (ref 130–400)
POTASSIUM SERPL-SCNC: 3.7 MEQ/L (ref 3.5–5.1)
PROTEIN UA: ABNORMAL MG/DL
PROTHROMBIN TIME: 9.8 SEC (ref 9–11.5)
RBC # BLD: 4.71 M/UL (ref 4.2–5.4)
RBC UA: NORMAL /HPF (ref 0–2)
SALICYLATE, SERUM: <0.3 MG/DL (ref 15–30)
SODIUM BLD-SCNC: 138 MEQ/L (ref 132–144)
SPECIFIC GRAVITY UA: 1.03 (ref 1–1.03)
TOTAL CK: 319 U/L (ref 0–170)
TOTAL PROTEIN: 6.9 G/DL (ref 6.4–8.1)
TROPONIN: <0.01 NG/ML (ref 0–0.01)
TSH SERPL DL<=0.05 MIU/L-ACNC: 1.85 UIU/ML (ref 0.27–4.2)
URINE REFLEX TO CULTURE: YES
UROBILINOGEN, URINE: 0.2 E.U./DL
WBC # BLD: 7.8 K/UL (ref 4.8–10.8)
WBC UA: NORMAL /HPF (ref 0–5)

## 2018-12-30 PROCEDURE — 82553 CREATINE MB FRACTION: CPT

## 2018-12-30 PROCEDURE — 71045 X-RAY EXAM CHEST 1 VIEW: CPT

## 2018-12-30 PROCEDURE — 99285 EMERGENCY DEPT VISIT HI MDM: CPT

## 2018-12-30 PROCEDURE — 36415 COLL VENOUS BLD VENIPUNCTURE: CPT

## 2018-12-30 PROCEDURE — 87086 URINE CULTURE/COLONY COUNT: CPT

## 2018-12-30 PROCEDURE — 70450 CT HEAD/BRAIN W/O DYE: CPT

## 2018-12-30 PROCEDURE — 85025 COMPLETE CBC W/AUTO DIFF WBC: CPT

## 2018-12-30 PROCEDURE — 93005 ELECTROCARDIOGRAM TRACING: CPT

## 2018-12-30 PROCEDURE — 82550 ASSAY OF CK (CPK): CPT

## 2018-12-30 PROCEDURE — G0480 DRUG TEST DEF 1-7 CLASSES: HCPCS

## 2018-12-30 PROCEDURE — 81001 URINALYSIS AUTO W/SCOPE: CPT

## 2018-12-30 PROCEDURE — 84443 ASSAY THYROID STIM HORMONE: CPT

## 2018-12-30 PROCEDURE — 80307 DRUG TEST PRSMV CHEM ANLYZR: CPT

## 2018-12-30 PROCEDURE — 85610 PROTHROMBIN TIME: CPT

## 2018-12-30 PROCEDURE — 84484 ASSAY OF TROPONIN QUANT: CPT

## 2018-12-30 PROCEDURE — 80053 COMPREHEN METABOLIC PANEL: CPT

## 2018-12-31 VITALS
TEMPERATURE: 98 F | HEIGHT: 62 IN | RESPIRATION RATE: 18 BRPM | WEIGHT: 120 LBS | SYSTOLIC BLOOD PRESSURE: 146 MMHG | HEART RATE: 65 BPM | DIASTOLIC BLOOD PRESSURE: 65 MMHG | OXYGEN SATURATION: 98 % | BODY MASS INDEX: 22.08 KG/M2

## 2018-12-31 PROCEDURE — 93010 ELECTROCARDIOGRAM REPORT: CPT | Performed by: INTERNAL MEDICINE

## 2018-12-31 ASSESSMENT — PATIENT HEALTH QUESTIONNAIRE - PHQ9: SUM OF ALL RESPONSES TO PHQ QUESTIONS 1-9: 18

## 2019-01-01 LAB — URINE CULTURE, ROUTINE: NORMAL

## 2019-01-03 ENCOUNTER — TELEPHONE (OUTPATIENT)
Dept: INTERNAL MEDICINE CLINIC | Age: 84
End: 2019-01-03

## 2019-01-04 ENCOUNTER — TELEPHONE (OUTPATIENT)
Dept: INTERNAL MEDICINE CLINIC | Age: 84
End: 2019-01-04

## 2019-01-10 ENCOUNTER — TELEPHONE (OUTPATIENT)
Dept: INTERNAL MEDICINE CLINIC | Age: 84
End: 2019-01-10

## 2019-01-10 DIAGNOSIS — F41.9 ANXIETY: ICD-10-CM

## 2019-01-11 RX ORDER — ALPRAZOLAM 0.25 MG/1
0.25 TABLET ORAL NIGHTLY PRN
Qty: 30 TABLET | Refills: 1 | Status: SHIPPED | OUTPATIENT
Start: 2019-01-11 | End: 2019-03-08 | Stop reason: SDUPTHER

## 2019-01-21 ENCOUNTER — TELEPHONE (OUTPATIENT)
Dept: INTERNAL MEDICINE CLINIC | Age: 84
End: 2019-01-21

## 2019-02-06 ENCOUNTER — TELEPHONE (OUTPATIENT)
Dept: INTERNAL MEDICINE CLINIC | Age: 84
End: 2019-02-06

## 2019-02-15 ENCOUNTER — OFFICE VISIT (OUTPATIENT)
Dept: INTERNAL MEDICINE CLINIC | Age: 84
End: 2019-02-15
Payer: MEDICARE

## 2019-02-15 VITALS
TEMPERATURE: 98.2 F | WEIGHT: 132.6 LBS | SYSTOLIC BLOOD PRESSURE: 148 MMHG | DIASTOLIC BLOOD PRESSURE: 76 MMHG | OXYGEN SATURATION: 98 % | RESPIRATION RATE: 16 BRPM | HEIGHT: 62 IN | HEART RATE: 71 BPM | BODY MASS INDEX: 24.4 KG/M2

## 2019-02-15 DIAGNOSIS — M81.0 SENILE OSTEOPOROSIS: ICD-10-CM

## 2019-02-15 DIAGNOSIS — E78.00 ELEVATED CHOLESTEROL: ICD-10-CM

## 2019-02-15 DIAGNOSIS — M54.16 SPINAL STENOSIS OF LUMBAR REGION WITH RADICULOPATHY: ICD-10-CM

## 2019-02-15 DIAGNOSIS — I48.91 ATRIAL FIBRILLATION WITH RVR (HCC): ICD-10-CM

## 2019-02-15 DIAGNOSIS — I10 ESSENTIAL HYPERTENSION: Primary | ICD-10-CM

## 2019-02-15 DIAGNOSIS — F41.8 DEPRESSION WITH ANXIETY: ICD-10-CM

## 2019-02-15 DIAGNOSIS — M48.061 SPINAL STENOSIS OF LUMBAR REGION WITH RADICULOPATHY: ICD-10-CM

## 2019-02-15 PROBLEM — F43.21 MOURNING: Status: RESOLVED | Noted: 2017-11-25 | Resolved: 2019-02-15

## 2019-02-15 PROBLEM — H35.3130 BILATERAL NONEXUDATIVE AGE-RELATED MACULAR DEGENERATION: Status: RESOLVED | Noted: 2017-10-31 | Resolved: 2019-02-15

## 2019-02-15 PROBLEM — M80.08XA FRACTURE OF VERTEBRA DUE TO OSTEOPOROSIS (HCC): Status: RESOLVED | Noted: 2018-02-22 | Resolved: 2019-02-15

## 2019-02-15 PROBLEM — G93.40 ACUTE ENCEPHALOPATHY: Status: RESOLVED | Noted: 2017-11-25 | Resolved: 2019-02-15

## 2019-02-15 PROBLEM — S22.009A MULTIPLE FRACTURES OF THORACIC SPINE (HCC): Status: RESOLVED | Noted: 2018-03-06 | Resolved: 2019-02-15

## 2019-02-15 PROBLEM — F40.298: Status: RESOLVED | Noted: 2017-11-25 | Resolved: 2019-02-15

## 2019-02-15 PROBLEM — Z63.4 DEATH OF HUSBAND: Status: RESOLVED | Noted: 2017-11-25 | Resolved: 2019-02-15

## 2019-02-15 PROBLEM — Z79.899 POLYPHARMACY: Status: RESOLVED | Noted: 2017-11-25 | Resolved: 2019-02-15

## 2019-02-15 PROCEDURE — 99214 OFFICE O/P EST MOD 30 MIN: CPT | Performed by: FAMILY MEDICINE

## 2019-02-15 RX ORDER — PAROXETINE 10 MG/1
10 TABLET, FILM COATED ORAL DAILY
Qty: 90 TABLET | Refills: 1 | Status: SHIPPED | OUTPATIENT
Start: 2019-02-15 | End: 2019-03-29

## 2019-02-15 RX ORDER — METOPROLOL SUCCINATE 25 MG/1
25 TABLET, EXTENDED RELEASE ORAL DAILY
Qty: 90 TABLET | Refills: 3 | Status: SHIPPED | OUTPATIENT
Start: 2019-02-15 | End: 2020-01-13

## 2019-02-15 RX ORDER — LISINOPRIL 10 MG/1
TABLET ORAL
Qty: 90 TABLET | Refills: 3 | Status: SHIPPED | OUTPATIENT
Start: 2019-02-15 | End: 2020-03-25 | Stop reason: SDUPTHER

## 2019-02-15 ASSESSMENT — PATIENT HEALTH QUESTIONNAIRE - PHQ9
1. LITTLE INTEREST OR PLEASURE IN DOING THINGS: 0
SUM OF ALL RESPONSES TO PHQ QUESTIONS 1-9: 0
SUM OF ALL RESPONSES TO PHQ9 QUESTIONS 1 & 2: 0
SUM OF ALL RESPONSES TO PHQ QUESTIONS 1-9: 0
2. FEELING DOWN, DEPRESSED OR HOPELESS: 0

## 2019-02-18 ENCOUNTER — TELEPHONE (OUTPATIENT)
Dept: INTERNAL MEDICINE CLINIC | Age: 84
End: 2019-02-18

## 2019-02-18 RX ORDER — DONEPEZIL HYDROCHLORIDE 10 MG/1
10 TABLET, FILM COATED ORAL NIGHTLY
Qty: 30 TABLET | Refills: 3 | Status: SHIPPED | OUTPATIENT
Start: 2019-02-18 | End: 2019-06-28

## 2019-03-07 DIAGNOSIS — F41.9 ANXIETY: ICD-10-CM

## 2019-03-08 RX ORDER — ALPRAZOLAM 0.25 MG/1
0.25 TABLET ORAL NIGHTLY PRN
Qty: 30 TABLET | Refills: 1 | Status: SHIPPED | OUTPATIENT
Start: 2019-03-08 | End: 2019-04-08

## 2019-03-20 DIAGNOSIS — M47.896 OTHER OSTEOARTHRITIS OF SPINE, LUMBAR REGION: ICD-10-CM

## 2019-03-20 RX ORDER — IBUPROFEN 800 MG/1
TABLET ORAL
Qty: 30 TABLET | Refills: 5 | Status: SHIPPED | OUTPATIENT
Start: 2019-03-20 | End: 2019-09-23 | Stop reason: SDUPTHER

## 2019-03-29 ENCOUNTER — TELEPHONE (OUTPATIENT)
Dept: FAMILY MEDICINE CLINIC | Age: 84
End: 2019-03-29

## 2019-03-29 ENCOUNTER — OFFICE VISIT (OUTPATIENT)
Dept: INTERNAL MEDICINE CLINIC | Age: 84
End: 2019-03-29
Payer: MEDICARE

## 2019-03-29 VITALS
OXYGEN SATURATION: 97 % | TEMPERATURE: 98.8 F | HEART RATE: 63 BPM | SYSTOLIC BLOOD PRESSURE: 134 MMHG | RESPIRATION RATE: 16 BRPM | WEIGHT: 130.4 LBS | DIASTOLIC BLOOD PRESSURE: 80 MMHG | BODY MASS INDEX: 24 KG/M2 | HEIGHT: 62 IN

## 2019-03-29 DIAGNOSIS — R26.9 GAIT DISTURBANCE: ICD-10-CM

## 2019-03-29 DIAGNOSIS — F41.8 ANXIETY WITH DEPRESSION: Primary | ICD-10-CM

## 2019-03-29 DIAGNOSIS — F99 INSOMNIA DUE TO OTHER MENTAL DISORDER: ICD-10-CM

## 2019-03-29 DIAGNOSIS — I10 ESSENTIAL HYPERTENSION: ICD-10-CM

## 2019-03-29 DIAGNOSIS — F51.05 INSOMNIA DUE TO OTHER MENTAL DISORDER: ICD-10-CM

## 2019-03-29 DIAGNOSIS — M54.16 LUMBAR RADICULAR PAIN: ICD-10-CM

## 2019-03-29 PROCEDURE — 99214 OFFICE O/P EST MOD 30 MIN: CPT | Performed by: PHYSICIAN ASSISTANT

## 2019-03-29 RX ORDER — HYDROXYZINE PAMOATE 25 MG/1
25 CAPSULE ORAL 3 TIMES DAILY PRN
Qty: 90 CAPSULE | Refills: 1 | Status: SHIPPED | OUTPATIENT
Start: 2019-03-29 | End: 2019-04-12

## 2019-03-29 ASSESSMENT — ENCOUNTER SYMPTOMS
BACK PAIN: 1
CONSTIPATION: 0
DIARRHEA: 0
NAUSEA: 0
SORE THROAT: 0
SHORTNESS OF BREATH: 0

## 2019-06-28 ENCOUNTER — OFFICE VISIT (OUTPATIENT)
Dept: FAMILY MEDICINE CLINIC | Age: 84
End: 2019-06-28
Payer: MEDICARE

## 2019-06-28 VITALS
DIASTOLIC BLOOD PRESSURE: 76 MMHG | SYSTOLIC BLOOD PRESSURE: 124 MMHG | TEMPERATURE: 99.1 F | OXYGEN SATURATION: 97 % | WEIGHT: 132.4 LBS | HEART RATE: 67 BPM | RESPIRATION RATE: 16 BRPM | HEIGHT: 62 IN | BODY MASS INDEX: 24.37 KG/M2

## 2019-06-28 DIAGNOSIS — I48.20 CHRONIC ATRIAL FIBRILLATION (HCC): ICD-10-CM

## 2019-06-28 DIAGNOSIS — M47.816 SPONDYLOSIS OF LUMBAR REGION WITHOUT MYELOPATHY OR RADICULOPATHY: ICD-10-CM

## 2019-06-28 DIAGNOSIS — I10 ESSENTIAL HYPERTENSION: Primary | ICD-10-CM

## 2019-06-28 DIAGNOSIS — M25.50 ARTHRALGIA, UNSPECIFIED JOINT: ICD-10-CM

## 2019-06-28 DIAGNOSIS — F41.8 ANXIETY WITH DEPRESSION: ICD-10-CM

## 2019-06-28 PROCEDURE — 99214 OFFICE O/P EST MOD 30 MIN: CPT | Performed by: PHYSICIAN ASSISTANT

## 2019-06-28 ASSESSMENT — ENCOUNTER SYMPTOMS
COUGH: 0
CONSTIPATION: 0
VOMITING: 0
BACK PAIN: 1
EYE PAIN: 0
SORE THROAT: 0
DIARRHEA: 0
NAUSEA: 0
WHEEZING: 0
SHORTNESS OF BREATH: 0

## 2019-06-28 NOTE — PROGRESS NOTES
Subjective  Carlin Engel, 82 y.o. female presents today with:  Chief Complaint   Patient presents with    Anxiety     3 month follow up. Patient states she is doing good and her medication is working    Heartburn     Patient c.o heartburn. States she forgets to take her medication sometimes. HPI  Is here for follow-up along with her daughter - she has been doing much better over the past 2 mos -  minimal anxiety or depression. she has been gardening regularly. She does have some joint pain but no falls uses Tylenol or Motrin as needed longer on gabapentin or any other pain medications through [facility]  She continues to live independently. Denies cp sob palpitations with activity  Review of Systems   Constitutional: Positive for fatigue. HENT: Negative for congestion and sore throat. Eyes: Negative for pain. Respiratory: Negative for cough, shortness of breath and wheezing. Gastrointestinal: Negative for constipation, diarrhea, nausea and vomiting. Genitourinary: Negative for dysuria. Musculoskeletal: Positive for back pain. Negative for neck pain. Skin: Negative for rash. Allergic/Immunologic: Negative for environmental allergies and food allergies. Neurological: Positive for headaches. Negative for dizziness. Psychiatric/Behavioral: Positive for sleep disturbance. Assessment & Plan    Diagnosis Orders   1. Essential hypertension  Comprehensive Metabolic Panel    CBC With Auto Differential   2. Anxiety with depression     3. Arthralgia, unspecified joint     4. Spondylosis of lumbar region without myelopathy or radiculopathy     5.  Chronic atrial fibrillation (HCC)           Orders Placed This Encounter   Procedures    Comprehensive Metabolic Panel     Standing Status:   Future     Standing Expiration Date:   6/28/2020    CBC With Auto Differential     Standing Status:   Future     Standing Expiration Date:   6/28/2020     No orders of the defined types were placed in this encounter. Medications Discontinued During This Encounter   Medication Reason    donepezil (ARICEPT) 10 MG tablet Patient Choice     Return in about 4 months (around 10/28/2019) for call for results, follow up on [facility]. Julissa Baeza PA-C        Past Medical History:   Diagnosis Date    Anxiety     Arthritis     Chronic back pain     DEGENERATIVE BACK    Depression     Distal radial fracture 2012    GERD (gastroesophageal reflux disease)     Glaucoma     History of mammography, screening 2011 CAT 2 BILAT    History of osteopenia     Hypertension     meds > 8 yrs / denies TIA or stroke    Lumbar radicular pain     Polycythemia vera(238.4)     Rib fracture 11/30/2018    Senile osteoporosis      Past Surgical History:   Procedure Laterality Date    APPENDECTOMY      BACK SURGERY      COLONOSCOPY      ENDOSCOPY, COLON, DIAGNOSTIC      EYE SURGERY      OS eye glaucoma OR    FRACTURE SURGERY Left 05/06/2012    distal radial fracture/Dr. Josafat Colunga    HYSTERECTOMY      [place] PERQ VERTEBROPLASTY UNI/BI INJX CERVICOTHORACIC N/A 3/7/2018    T-11, T-12 VERTEBRAL AUGMENTATION performed by Brittani Whelan MD at [address] [place] [address] Marital status:       Spouse name: Not on file    Number of children: Not on file    Years of education: Not on file    Highest education level: Not on file   Occupational History    Not on file   Social Needs    Financial resource strain: Not on file    Food insecurity:     Worry: Not on file     Inability: Not on file    Transportation needs:     Medical: Not on file     Non-medical: Not on file   Tobacco Use    Smoking status: Never Smoker    Smokeless tobacco: Never Used   Substance and Sexual Activity    Alcohol use: No     Comment: denies    Drug use: No    Sexual activity: Never   Lifestyle    Physical activity:     Days per week: Not on file     Minutes per session: Not on file    Stress: Not on file   Relationships    Social connections:     Talks on phone: Not on file     Gets together: Not on file     Attends Episcopalian service: Not on file     Active member of club or organization: Not on file     Attends meetings of clubs or organizations: Not on file     Relationship status: Not on file    Intimate partner violence:     Fear of current or ex partner: Not on file     Emotionally abused: Not on file     Physically abused: Not on file     Forced sexual activity: Not on file   Other Topics Concern    Not on file   Social History Narrative    Not on file     Family History   Problem Relation Age of Onset    Breast Cancer Daughter     Cancer Daughter         kidney cancer    Diabetes Mother     Diabetes Father     High Cholesterol Sister     Other Sister          of old age   Eugenie Landaverde Other Brother         accident / car fall on him    Cancer Son         prostate cancer        Allergies   Allergen Reactions    Hydromorphone Other (See Comments)    Dilaudid [Fd&C Blue #1 Al Cornejo-Hydromorphone] Other (See Comments)     crazziness     Current Outpatient Medications   Medication Sig Dispense Refill    ibuprofen (ADVIL;MOTRIN) 800 MG tablet TAKE ONE TABLET BY MOUTH EVERY DAY WITH FOOD AS NEEDED FOR PAIN 30 tablet 5    metoprolol succinate (TOPROL XL) 25 MG extended release tablet Take 1 tablet by mouth daily 90 tablet 3    lisinopril (PRINIVIL;ZESTRIL) 10 MG tablet TAKE ONE TABLET BY MOUTH EVERY DAY 90 tablet 3    Nutritional Supplements (ENSURE COMPLETE SHAKE) LIQD Take 1 Can by mouth 3 times daily Strawberry or chocolate flavor 90 Bottle 5    ibandronate (BONIVA) 150 MG tablet Take 1 tablet by mouth every 30 days Take one (1) tablet once per month in the morning with a full glass of water, on an empty stomach, and do not take anything else by mouth or lie down for the next 30 minutes.  4 tablet 3    lansoprazole (PREVACID) 30 MG delayed release capsule TAKE ONE CAPSULE BY MOUTH EVERY DAY 90 capsule 3    ranitidine (ZANTAC) 150 MG tablet Take 1 tablet by mouth nightly 90 tablet 2    diclofenac sodium 1 % GEL Apply 2 g topically 4 times daily as needed for Pain 100 g 3    bimatoprost (LUMIGAN) 0.01 % SOLN ophthalmic drops Place 1 drop into both eyes every evening       No current facility-administered medications for this visit. Objective    Vitals:    06/28/19 1036   BP: 124/76   Site: Right Upper Arm   Position: Sitting   Cuff Size: Medium Adult   Pulse: 67   Resp: 16   Temp: 99.1 °F (37.3 °C)   TempSrc: Oral   SpO2: 97%   Weight: 132 lb 6.4 oz (60.1 kg)   Height: 5' 2\" (1.575 m)     Physical Exam   Constitutional: She is oriented to person, place, and time. She appears well-developed and well-nourished. HENT:   Head: Normocephalic and atraumatic. Eyes: Pupils are equal, round, and reactive to light. Conjunctivae and EOM are normal.   Neck: Normal range of motion. Neck supple. No thyromegaly present. Cardiovascular: Normal rate, regular rhythm and normal heart sounds. No murmur heard. Pulmonary/Chest: Effort normal and breath sounds normal. No stridor. No respiratory distress. She has no wheezes. Abdominal: Soft. Bowel sounds are normal. She exhibits no mass. There is no tenderness. There is no guarding. Musculoskeletal: Normal range of motion. She exhibits tenderness and deformity. She exhibits no edema. kyphosis   Lymphadenopathy:     She has no cervical adenopathy. Neurological: She is alert and oriented to person, place, and time. She exhibits normal muscle tone. Coordination normal.   Ambulates with assistance able to rise from sitting without assistance   Skin: Skin is warm and dry. Psychiatric: She has a normal mood and affect. Assessment & Plan    Diagnosis Orders   1.  Essential hypertension  Comprehensive Metabolic Panel    CBC With Auto Differential         Orders Placed This Encounter   Procedures    Comprehensive Metabolic Panel     Standing Status: Future     Standing Expiration Date:   6/28/2020    CBC With Auto Differential     Standing Status:   Future     Standing Expiration Date:   6/28/2020     No orders of the defined types were placed in this encounter. Medications Discontinued During This Encounter   Medication Reason    donepezil (ARICEPT) 10 MG tablet Patient Choice     Return in about 4 months (around 10/28/2019) for call for results, follow up on medicaiton.     Julissa Baeza PA-C

## 2019-07-05 DIAGNOSIS — K21.00 GASTROESOPHAGEAL REFLUX DISEASE WITH ESOPHAGITIS: ICD-10-CM

## 2019-07-08 RX ORDER — RANITIDINE 150 MG/1
150 TABLET ORAL NIGHTLY
Qty: 90 TABLET | Refills: 1 | Status: SHIPPED | OUTPATIENT
Start: 2019-07-08 | End: 2020-11-05

## 2019-07-09 DIAGNOSIS — I10 ESSENTIAL HYPERTENSION: ICD-10-CM

## 2019-07-09 LAB
ALBUMIN SERPL-MCNC: 4.6 G/DL (ref 3.5–4.6)
ALP BLD-CCNC: 65 U/L (ref 40–130)
ALT SERPL-CCNC: 13 U/L (ref 0–33)
ANION GAP SERPL CALCULATED.3IONS-SCNC: 14 MEQ/L (ref 9–15)
AST SERPL-CCNC: 22 U/L (ref 0–35)
BASOPHILS ABSOLUTE: 0.1 K/UL (ref 0–0.2)
BASOPHILS RELATIVE PERCENT: 0.8 %
BILIRUB SERPL-MCNC: 0.6 MG/DL (ref 0.2–0.7)
BUN BLDV-MCNC: 16 MG/DL (ref 8–23)
CALCIUM SERPL-MCNC: 8.9 MG/DL (ref 8.5–9.9)
CHLORIDE BLD-SCNC: 103 MEQ/L (ref 95–107)
CO2: 26 MEQ/L (ref 20–31)
CREAT SERPL-MCNC: 0.68 MG/DL (ref 0.5–0.9)
EOSINOPHILS ABSOLUTE: 0.2 K/UL (ref 0–0.7)
EOSINOPHILS RELATIVE PERCENT: 2.8 %
GFR AFRICAN AMERICAN: >60
GFR NON-AFRICAN AMERICAN: >60
GLOBULIN: 2.5 G/DL (ref 2.3–3.5)
GLUCOSE BLD-MCNC: 74 MG/DL (ref 70–99)
HCT VFR BLD CALC: 44.4 % (ref 37–47)
HEMOGLOBIN: 15.3 G/DL (ref 12–16)
LYMPHOCYTES ABSOLUTE: 1.5 K/UL (ref 1–4.8)
LYMPHOCYTES RELATIVE PERCENT: 22.7 %
MCH RBC QN AUTO: 33.4 PG (ref 27–31.3)
MCHC RBC AUTO-ENTMCNC: 34.5 % (ref 33–37)
MCV RBC AUTO: 97 FL (ref 82–100)
MONOCYTES ABSOLUTE: 0.5 K/UL (ref 0.2–0.8)
MONOCYTES RELATIVE PERCENT: 7.8 %
NEUTROPHILS ABSOLUTE: 4.4 K/UL (ref 1.4–6.5)
NEUTROPHILS RELATIVE PERCENT: 65.9 %
PDW BLD-RTO: 14.4 % (ref 11.5–14.5)
PLATELET # BLD: 299 K/UL (ref 130–400)
POTASSIUM SERPL-SCNC: 4.4 MEQ/L (ref 3.4–4.9)
RBC # BLD: 4.58 M/UL (ref 4.2–5.4)
SODIUM BLD-SCNC: 143 MEQ/L (ref 135–144)
TOTAL PROTEIN: 7.1 G/DL (ref 6.3–8)
WBC # BLD: 6.6 K/UL (ref 4.8–10.8)

## 2019-09-23 DIAGNOSIS — M47.896 OTHER OSTEOARTHRITIS OF SPINE, LUMBAR REGION: ICD-10-CM

## 2019-09-23 RX ORDER — IBUPROFEN 800 MG/1
TABLET ORAL
Qty: 30 TABLET | Refills: 2 | Status: SHIPPED | OUTPATIENT
Start: 2019-09-23

## 2019-10-02 ENCOUNTER — OFFICE VISIT (OUTPATIENT)
Dept: FAMILY MEDICINE CLINIC | Age: 84
End: 2019-10-02
Payer: MEDICARE

## 2019-10-02 VITALS
HEART RATE: 74 BPM | BODY MASS INDEX: 25.58 KG/M2 | RESPIRATION RATE: 16 BRPM | OXYGEN SATURATION: 97 % | HEIGHT: 62 IN | WEIGHT: 139 LBS | TEMPERATURE: 98.5 F | SYSTOLIC BLOOD PRESSURE: 124 MMHG | DIASTOLIC BLOOD PRESSURE: 80 MMHG

## 2019-10-02 DIAGNOSIS — F41.8 ANXIETY WITH DEPRESSION: ICD-10-CM

## 2019-10-02 DIAGNOSIS — M48.061 SPINAL STENOSIS OF LUMBAR REGION WITH RADICULOPATHY: ICD-10-CM

## 2019-10-02 DIAGNOSIS — Z23 NEED FOR INFLUENZA VACCINATION: ICD-10-CM

## 2019-10-02 DIAGNOSIS — I48.91 ATRIAL FIBRILLATION WITH RVR (HCC): Primary | ICD-10-CM

## 2019-10-02 DIAGNOSIS — M54.16 SPINAL STENOSIS OF LUMBAR REGION WITH RADICULOPATHY: ICD-10-CM

## 2019-10-02 DIAGNOSIS — I10 ESSENTIAL HYPERTENSION: ICD-10-CM

## 2019-10-02 PROCEDURE — 99214 OFFICE O/P EST MOD 30 MIN: CPT | Performed by: PHYSICIAN ASSISTANT

## 2019-10-02 PROCEDURE — 90653 IIV ADJUVANT VACCINE IM: CPT | Performed by: PHYSICIAN ASSISTANT

## 2019-10-02 PROCEDURE — G0008 ADMIN INFLUENZA VIRUS VAC: HCPCS | Performed by: PHYSICIAN ASSISTANT

## 2019-10-02 ASSESSMENT — ENCOUNTER SYMPTOMS
COUGH: 0
CONSTIPATION: 0
SHORTNESS OF BREATH: 0
BACK PAIN: 1
DIARRHEA: 0
NAUSEA: 0
WHEEZING: 0
VOMITING: 0
SORE THROAT: 0

## 2019-10-07 DIAGNOSIS — M80.00XS AGE-RELATED OSTEOPOROSIS WITH CURRENT PATHOLOGICAL FRACTURE, SEQUELA: ICD-10-CM

## 2019-10-07 RX ORDER — IBANDRONATE SODIUM 150 MG/1
150 TABLET, FILM COATED ORAL
Qty: 4 TABLET | Refills: 11 | Status: SHIPPED | OUTPATIENT
Start: 2019-10-07 | End: 2021-01-19

## 2019-10-09 ENCOUNTER — TELEPHONE (OUTPATIENT)
Dept: FAMILY MEDICINE CLINIC | Age: 84
End: 2019-10-09

## 2019-10-09 DIAGNOSIS — M80.00XS AGE-RELATED OSTEOPOROSIS WITH CURRENT PATHOLOGICAL FRACTURE, SEQUELA: ICD-10-CM

## 2019-10-09 RX ORDER — IBANDRONATE SODIUM 150 MG/1
150 TABLET, FILM COATED ORAL
Qty: 4 TABLET | Refills: 11 | Status: CANCELLED | OUTPATIENT
Start: 2019-10-09

## 2019-10-21 DIAGNOSIS — K21.00 GASTROESOPHAGEAL REFLUX DISEASE WITH ESOPHAGITIS: ICD-10-CM

## 2019-10-22 RX ORDER — LANSOPRAZOLE 30 MG/1
CAPSULE, DELAYED RELEASE ORAL
Qty: 90 CAPSULE | Refills: 1 | Status: SHIPPED | OUTPATIENT
Start: 2019-10-22 | End: 2020-03-26 | Stop reason: SDUPTHER

## 2019-10-28 ENCOUNTER — TELEPHONE (OUTPATIENT)
Dept: FAMILY MEDICINE CLINIC | Age: 84
End: 2019-10-28

## 2019-10-28 DIAGNOSIS — M54.16 SPINAL STENOSIS OF LUMBAR REGION WITH RADICULOPATHY: ICD-10-CM

## 2019-10-28 DIAGNOSIS — I48.91 ATRIAL FIBRILLATION WITH RVR (HCC): ICD-10-CM

## 2019-10-28 DIAGNOSIS — M48.061 SPINAL STENOSIS OF LUMBAR REGION WITH RADICULOPATHY: ICD-10-CM

## 2019-11-12 ENCOUNTER — TELEPHONE (OUTPATIENT)
Dept: FAMILY MEDICINE CLINIC | Age: 84
End: 2019-11-12

## 2020-01-13 RX ORDER — METOPROLOL SUCCINATE 25 MG/1
TABLET, EXTENDED RELEASE ORAL
Qty: 90 TABLET | Refills: 3 | Status: SHIPPED | OUTPATIENT
Start: 2020-01-13 | End: 2020-02-28

## 2020-02-28 RX ORDER — METOPROLOL SUCCINATE 25 MG/1
TABLET, EXTENDED RELEASE ORAL
Qty: 90 TABLET | Refills: 3 | Status: SHIPPED | OUTPATIENT
Start: 2020-02-28 | End: 2020-12-21

## 2020-03-25 RX ORDER — LISINOPRIL 10 MG/1
TABLET ORAL
Qty: 90 TABLET | Refills: 1 | Status: SHIPPED | OUTPATIENT
Start: 2020-03-25 | End: 2020-08-18 | Stop reason: SDUPTHER

## 2020-03-26 RX ORDER — LANSOPRAZOLE 30 MG/1
CAPSULE, DELAYED RELEASE ORAL
Qty: 90 CAPSULE | Refills: 1 | Status: SHIPPED | OUTPATIENT
Start: 2020-03-26 | End: 2020-08-18 | Stop reason: SDUPTHER

## 2020-06-04 ENCOUNTER — OFFICE VISIT (OUTPATIENT)
Dept: FAMILY MEDICINE CLINIC | Age: 85
End: 2020-06-04
Payer: MEDICARE

## 2020-06-04 ENCOUNTER — TELEPHONE (OUTPATIENT)
Dept: FAMILY MEDICINE CLINIC | Age: 85
End: 2020-06-04

## 2020-06-04 VITALS
BODY MASS INDEX: 25.03 KG/M2 | HEIGHT: 62 IN | TEMPERATURE: 97.2 F | HEART RATE: 78 BPM | WEIGHT: 136 LBS | DIASTOLIC BLOOD PRESSURE: 78 MMHG | SYSTOLIC BLOOD PRESSURE: 122 MMHG | OXYGEN SATURATION: 98 %

## 2020-06-04 PROCEDURE — 99214 OFFICE O/P EST MOD 30 MIN: CPT | Performed by: PHYSICIAN ASSISTANT

## 2020-06-04 SDOH — ECONOMIC STABILITY: FOOD INSECURITY: WITHIN THE PAST 12 MONTHS, THE FOOD YOU BOUGHT JUST DIDN'T LAST AND YOU DIDN'T HAVE MONEY TO GET MORE.: NEVER TRUE

## 2020-06-04 SDOH — ECONOMIC STABILITY: FOOD INSECURITY: WITHIN THE PAST 12 MONTHS, YOU WORRIED THAT YOUR FOOD WOULD RUN OUT BEFORE YOU GOT MONEY TO BUY MORE.: NEVER TRUE

## 2020-06-04 ASSESSMENT — ENCOUNTER SYMPTOMS
EYES NEGATIVE: 1
RESPIRATORY NEGATIVE: 1
ALLERGIC/IMMUNOLOGIC NEGATIVE: 1
DIARRHEA: 0
VOMITING: 0
BACK PAIN: 1
GASTROINTESTINAL NEGATIVE: 1
NAUSEA: 0
SORE THROAT: 0

## 2020-06-04 NOTE — PROGRESS NOTES
Subjective  Carlin Engel, 82 y.o. female presents today with:  Chief Complaint   Patient presents with    Hypertension     Patient here today with 6 month f/u, right shoulder has been giving here issues,        HPI  Patient is here for 6-month follow-up on hypertension denies chest pain pressure shortness of breath she has been having some minimal  right shoulder pain and low back pain but states it is tolerable. Denies falls  Doing a lot of gardening  Also having a bit of reflux acid no skin for replacement to Zantac denies rectal bleeding  Pt's daughter was present during visit   she stopped all medication for anxiety and her memory states it made her feel worse  Review of Systems   Constitutional: Positive for fatigue. Negative for chills and fever. HENT: Negative for ear pain, sore throat and tinnitus. Eyes: Negative. Respiratory: Negative. Cardiovascular: Negative. Gastrointestinal: Negative. Negative for diarrhea, nausea and vomiting. Occ heartburn   Endocrine: Negative. Genitourinary: Negative. Musculoskeletal: Positive for arthralgias and back pain. Negative for myalgias and neck stiffness. Skin: Negative for rash. Allergic/Immunologic: Negative. Neurological: Negative. Hematological: Negative. Psychiatric/Behavioral: Negative.           Past Medical History:   Diagnosis Date    Anxiety     Arthritis     Chronic back pain     DEGENERATIVE BACK    Depression     Distal radial fracture 2012    GERD (gastroesophageal reflux disease)     Glaucoma     History of mammography, screening 2011 CAT 2 BILAT    History of osteopenia     Hypertension     meds > 8 yrs / denies TIA or stroke    Lumbar radicular pain     Polycythemia vera(238.4)     Rib fracture 11/30/2018    Senile osteoporosis      Past Surgical History:   Procedure Laterality Date    APPENDECTOMY      BACK SURGERY      COLONOSCOPY      ENDOSCOPY, COLON, DIAGNOSTIC      EYE SURGERY      OS eye glaucoma OR    FRACTURE SURGERY Left 2012    distal radial fracture/Dr. Megan Titus    HYSTERECTOMY      [place] PERQ VERTEBROPLASTY UNI/BI INJX CERVICOTHORACIC N/A 3/7/2018    T-11, T-12 VERTEBRAL AUGMENTATION performed by Sang Coombs MD at [address] [place] [address] Marital status:       Spouse name: Not on file    Number of children: Not on file    Years of education: Not on file    Highest education level: Not on file   Occupational History    Not on file   Social Needs    Financial resource strain: Not on file    Food insecurity     Worry: Never true     Inability: Never true   [employer] needs     Medical: Not on file     Non-medical: Not on file   Tobacco Use    Smoking status: Never Smoker    Smokeless tobacco: Never Used   Substance and Sexual Activity    Alcohol use: No     Comment: denies    Drug use: No    Sexual activity: Never   Lifestyle    Physical activity     Days per week: Not on file     Minutes per session: Not on file    Stress: Not on file   Relationships    Social connections     Talks on phone: Not on file     Gets together: Not on file     Attends Restorationist service: Not on file     Active member of club or organization: Not on file     Attends meetings of clubs or organizations: Not on file     Relationship status: Not on file    Intimate partner violence     Fear of current or ex partner: Not on file     Emotionally abused: Not on file     Physically abused: Not on file     Forced sexual activity: Not on file   Other Topics Concern    Not on file   Social History Narrative    Not on file     Family History   Problem Relation Age of Onset    Breast Cancer Daughter     Cancer Daughter         kidney cancer    Diabetes Mother     Diabetes Father     High Cholesterol Sister     Other Sister          of old age   Bonita Solanoivan Other Brother         accident / car fall on him    Cancer Son         prostate cancer        Allergies Allergen Reactions    Hydromorphone Other (See Comments)    Dilaudid [Fd&C Blue #1 Al Cornejo-Hydromorphone] Other (See Comments)     crazziness     Current Outpatient Medications   Medication Sig Dispense Refill    diclofenac sodium (VOLTAREN) 1 % GEL Apply 4 g topically 4 times daily 4 Tube 3    lansoprazole (PREVACID) 30 MG delayed release capsule TAKE ONE CAPSULE BY MOUTH EVERY DAY 90 capsule 1    lisinopril (PRINIVIL;ZESTRIL) 10 MG tablet TAKE ONE TABLET BY MOUTH EVERY DAY 90 tablet 1    metoprolol succinate (TOPROL XL) 25 MG extended release tablet TAKE ONE TABLET BY MOUTH EVERY DAY 90 tablet 3    Handicap Placard MISC by Does not apply route Start date:  12/18/2019 Expiration date 12/18/2024 1 each 0    ibandronate (BONIVA) 150 MG tablet Take 1 tablet by mouth every 30 days Take one (1) tablet once per month in the morning with a full glass of water, on an empty stomach, and do not take anything else by mouth or lie down for the next 30 minutes. 4 tablet 11    ibuprofen (ADVIL;MOTRIN) 800 MG tablet TAKE ONE TABLET BY MOUTH EVERY DAY WITH FOOD AS NEEDED FOR PAIN 30 tablet 2    Nutritional Supplements (ENSURE COMPLETE SHAKE) LIQD Take 1 Can by mouth 3 times daily Strawberry or chocolate flavor 90 Bottle 5    diclofenac sodium 1 % GEL Apply 2 g topically 4 times daily as needed for Pain 100 g 3    ranitidine (ZANTAC) 150 MG tablet Take 1 tablet by mouth nightly (Patient not taking: Reported on 6/4/2020) 90 tablet 1    bimatoprost (LUMIGAN) 0.01 % SOLN ophthalmic drops Place 1 drop into both eyes every evening       No current facility-administered medications for this visit. Objective    Vitals:    06/04/20 1125   BP: 122/78   Site: Left Upper Arm   Position: Sitting   Cuff Size: Large Adult   Pulse: 78   Temp: 97.2 °F (36.2 °C)   TempSrc: Oral   SpO2: 98%   Weight: 136 lb (61.7 kg)   Height: 5' 2\" (1.575 m)     Physical Exam  Constitutional:       General: She is not in acute distress. Metabolic Panel    Lipid, Fasting    CBC    controlled   2. Vitamin D deficiency  Vitamin D 25 Hydroxy   3. Arthralgia, unspecified joint  diclofenac sodium (VOLTAREN) 1 % GEL   4. Anxiety with depression      denies. does not feel she needs medication   5. Loss of memory      declines medication         Orders Placed This Encounter   Procedures    Comprehensive Metabolic Panel     Standing Status:   Future     Standing Expiration Date:   6/4/2021    Lipid, Fasting     Standing Status:   Future     Standing Expiration Date:   6/4/2021    CBC     Standing Status:   Future     Standing Expiration Date:   6/4/2021    Vitamin D 25 Hydroxy     Standing Status:   Future     Standing Expiration Date:   6/4/2021     Orders Placed This Encounter   Medications    diclofenac sodium (VOLTAREN) 1 % GEL     Sig: Apply 4 g topically 4 times daily     Dispense:  4 Tube     Refill:  3     There are no discontinued medications. Return in about 5 months (around 11/4/2020), or if symptoms worsen or fail to improve, for call for results.     Julissa Baeza PA-C

## 2020-06-08 DIAGNOSIS — E55.9 VITAMIN D DEFICIENCY: ICD-10-CM

## 2020-06-08 DIAGNOSIS — I10 ESSENTIAL HYPERTENSION: ICD-10-CM

## 2020-06-08 LAB
ALBUMIN SERPL-MCNC: 4.2 G/DL (ref 3.5–4.6)
ALP BLD-CCNC: 63 U/L (ref 40–130)
ALT SERPL-CCNC: 13 U/L (ref 0–33)
ANION GAP SERPL CALCULATED.3IONS-SCNC: 14 MEQ/L (ref 9–15)
AST SERPL-CCNC: 23 U/L (ref 0–35)
BILIRUB SERPL-MCNC: 0.7 MG/DL (ref 0.2–0.7)
BUN BLDV-MCNC: 11 MG/DL (ref 8–23)
CALCIUM SERPL-MCNC: 9.4 MG/DL (ref 8.5–9.9)
CHLORIDE BLD-SCNC: 105 MEQ/L (ref 95–107)
CHOLESTEROL, FASTING: 193 MG/DL (ref 0–199)
CO2: 24 MEQ/L (ref 20–31)
CREAT SERPL-MCNC: 0.77 MG/DL (ref 0.5–0.9)
GFR AFRICAN AMERICAN: >60
GFR NON-AFRICAN AMERICAN: >60
GLOBULIN: 2.7 G/DL (ref 2.3–3.5)
GLUCOSE BLD-MCNC: 94 MG/DL (ref 70–99)
HCT VFR BLD CALC: 46.7 % (ref 37–47)
HDLC SERPL-MCNC: 70 MG/DL (ref 40–59)
HEMOGLOBIN: 15.5 G/DL (ref 12–16)
LDL CHOLESTEROL CALCULATED: 100 MG/DL (ref 0–129)
MCH RBC QN AUTO: 32.4 PG (ref 27–31.3)
MCHC RBC AUTO-ENTMCNC: 33.1 % (ref 33–37)
MCV RBC AUTO: 97.9 FL (ref 82–100)
PDW BLD-RTO: 13.7 % (ref 11.5–14.5)
PLATELET # BLD: 254 K/UL (ref 130–400)
POTASSIUM SERPL-SCNC: 4.8 MEQ/L (ref 3.4–4.9)
RBC # BLD: 4.77 M/UL (ref 4.2–5.4)
SODIUM BLD-SCNC: 143 MEQ/L (ref 135–144)
TOTAL PROTEIN: 6.9 G/DL (ref 6.3–8)
TRIGLYCERIDE, FASTING: 113 MG/DL (ref 0–150)
VITAMIN D 25-HYDROXY: 48.4 NG/ML (ref 30–100)
WBC # BLD: 6.1 K/UL (ref 4.8–10.8)

## 2020-06-08 NOTE — TELEPHONE ENCOUNTER
Called daughter and [facility]

## 2020-08-18 RX ORDER — LANSOPRAZOLE 30 MG/1
CAPSULE, DELAYED RELEASE ORAL
Qty: 90 CAPSULE | Refills: 1 | Status: SHIPPED | OUTPATIENT
Start: 2020-08-18 | End: 2020-12-29

## 2020-08-18 RX ORDER — LISINOPRIL 10 MG/1
TABLET ORAL
Qty: 90 TABLET | Refills: 1 | Status: SHIPPED | OUTPATIENT
Start: 2020-08-18 | End: 2021-01-08

## 2020-08-18 NOTE — TELEPHONE ENCOUNTER
Carlin Engel child is calling in requesting a refill on medication(s):    Requested Prescriptions     Pending Prescriptions Disp Refills    lisinopril (PRINIVIL;ZESTRIL) 10 MG tablet 90 tablet 1     Sig: TAKE ONE TABLET BY MOUTH EVERY DAY    lansoprazole (PREVACID) 30 MG delayed release capsule 90 capsule 1     Sig: TAKE ONE CAPSULE BY MOUTH EVERY DAY          Patient's Last Office Visit:  6/4/2020     Patient's Next Visit:  11/5/2020     Patient's Medication Contract:  Medication Contract and Consent for Opioid Use Documents Filed     Patient Documents       Type of Document Status Date Received Received By Description     Medication Contract [Status Missing]  Annie Baig 9/26/2016 Medication Agreement     Medication Contract [Status Missing]  ROSANGELA Krause Medication contract 5/2015     Medication Contract Received 11/15/2017 11:10 AM JOSELUIS TAY medication agreement 11/9/17     Medication Contract Received 1/18/2019  1:36 PM Marisol Failing contract 1/11/18                 Tox Screen:  Urine Drug Screenings (1 yr)     Urine Drug Screen  Collected: 12/30/2018  9:26 PM (Final result)    Complete Results          Urine Drug Screen  Collected: 11/25/2017  2:15 PM (Final result)    Complete Results          Urine Drug Screen  Collected: 11/9/2017  8:36 PM (Final result)    Complete Results                 Pharmacy:  Please send the medication to the pharmacy listed. Other Comments:  Daughter is on patient's hippa.

## 2020-11-05 ENCOUNTER — OFFICE VISIT (OUTPATIENT)
Dept: FAMILY MEDICINE CLINIC | Age: 85
End: 2020-11-05
Payer: MEDICARE

## 2020-11-05 ENCOUNTER — TELEPHONE (OUTPATIENT)
Dept: FAMILY MEDICINE CLINIC | Age: 85
End: 2020-11-05

## 2020-11-05 VITALS
HEART RATE: 76 BPM | SYSTOLIC BLOOD PRESSURE: 132 MMHG | RESPIRATION RATE: 15 BRPM | TEMPERATURE: 98 F | OXYGEN SATURATION: 97 % | WEIGHT: 141 LBS | DIASTOLIC BLOOD PRESSURE: 84 MMHG | HEIGHT: 62 IN | BODY MASS INDEX: 25.95 KG/M2

## 2020-11-05 PROCEDURE — 93000 ELECTROCARDIOGRAM COMPLETE: CPT | Performed by: PHYSICIAN ASSISTANT

## 2020-11-05 PROCEDURE — 99214 OFFICE O/P EST MOD 30 MIN: CPT | Performed by: PHYSICIAN ASSISTANT

## 2020-11-05 NOTE — PROGRESS NOTES
Subjective  Carlin Engel, 82 y.o. female presents today with:  Chief Complaint   Patient presents with    3 Month Follow-Up       HPI  Is here for follow-up on hypertension states she has been feeling well denies chest pain pressure shortness of breath she has remained quite active feels well overall  Denies anxiety depression  Admits to acid reflux but infrequently  Denies back pain    Review of Systems   All other systems reviewed and are negative. Past Medical History:   Diagnosis Date    Anxiety     Arthritis     Chronic back pain     DEGENERATIVE BACK    Depression     Distal radial fracture 2012    GERD (gastroesophageal reflux disease)     Glaucoma     History of mammography, screening 2011 CAT 2 BILAT    History of osteopenia     Hypertension     meds > 8 yrs / denies TIA or stroke    Lumbar radicular pain     Polycythemia vera(238.4)     Rib fracture 11/30/2018    Senile osteoporosis      Past Surgical History:   Procedure Laterality Date    APPENDECTOMY      BACK SURGERY      COLONOSCOPY      ENDOSCOPY, COLON, DIAGNOSTIC      EYE SURGERY      OS eye glaucoma OR    FRACTURE SURGERY Left 05/06/2012    distal radial fracture/Dr. Johanna Li    HYSTERECTOMY      [place] PERQ VERTEBROPLASTY UNI/BI INJX CERVICOTHORACIC N/A 3/7/2018    T-11, T-12 VERTEBRAL AUGMENTATION performed by Kenya Banuelos MD at [address] [place] [address] Marital status:      Spouse name: Not on file    Number of children: Not on file    Years of education: Not on file    Highest education level: Not on file   Occupational History    Not on file   Social Needs    Financial resource strain: Not on file    Food insecurity     Worry: Never true     Inability: Never true   [employer] needs     Medical: Not on file     Non-medical: Not on file   Tobacco Use    Smoking status: Never Smoker    Smokeless tobacco: Never Used   Substance and Sexual Activity    Alcohol use:  No Comment: denies    Drug use: No    Sexual activity: Never   Lifestyle    Physical activity     Days per week: Not on file     Minutes per session: Not on file    Stress: Not on file   Relationships    Social connections     Talks on phone: Not on file     Gets together: Not on file     Attends Scientologist service: Not on file     Active member of club or organization: Not on file     Attends meetings of clubs or organizations: Not on file     Relationship status: Not on file    Intimate partner violence     Fear of current or ex partner: Not on file     Emotionally abused: Not on file     Physically abused: Not on file     Forced sexual activity: Not on file   Other Topics Concern    Not on file   Social History Narrative    Not on file     Family History   Problem Relation Age of Onset    Breast Cancer Daughter     Cancer Daughter         kidney cancer    Diabetes Mother     Diabetes Father     High Cholesterol Sister     Other Sister          of old age   Christy Solum Other Brother         accident / car fall on him    Cancer Son         prostate cancer        Allergies   Allergen Reactions    Hydromorphone Other (See Comments)    Dilaudid [Fd&C Blue #1 Al Cornejo-Hydromorphone] Other (See Comments)     crazziness     Current Outpatient Medications   Medication Sig Dispense Refill    lisinopril (PRINIVIL;ZESTRIL) 10 MG tablet TAKE ONE TABLET BY MOUTH EVERY DAY 90 tablet 1    lansoprazole (PREVACID) 30 MG delayed release capsule TAKE ONE CAPSULE BY MOUTH EVERY DAY 90 capsule 1    diclofenac sodium (VOLTAREN) 1 % GEL Apply 4 g topically 4 times daily 4 Tube 3    metoprolol succinate (TOPROL XL) 25 MG extended release tablet TAKE ONE TABLET BY MOUTH EVERY DAY 90 tablet 3    Handicap Placard MISC by Does not apply route Start date:  2019 Expiration date 2024 1 each 0    ibandronate (BONIVA) 150 MG tablet Take 1 tablet by mouth every 30 days Take one (1) tablet once per month in the morning with Cervical: No cervical adenopathy. Skin:     General: Skin is warm and dry. Coloration: Skin is not jaundiced. Neurological:      General: No focal deficit present. Mental Status: She is alert and oriented to person, place, and time. Cranial Nerves: No cranial nerve deficit. Motor: No weakness. Coordination: Coordination normal.      Gait: Gait normal.   Psychiatric:         Mood and Affect: Mood normal.         Behavior: Behavior normal.         Thought Content: Thought content normal.         Judgment: Judgment normal.              Assessment & Plan    Diagnosis Orders   1. Irregular heart beat  EKG 12 lead    EKG done in office indicates PACs however unable to clearly define rhythm due to deficiency of recording. will refer to biomed   2. Essential hypertension      controlled   3. Gastroesophageal reflux disease with esophagitis, unspecified whether hemorrhage           Orders Placed This Encounter   Procedures    EKG 12 lead     Order Specific Question:   Reason for Exam?     Answer:   Irregular heart rate    EKG 12 lead     Standing Status:   Future     Standing Expiration Date:   1/4/2021     Order Specific Question:   Reason for Exam?     Answer:   Irregular heart rate     No orders of the defined types were placed in this encounter. Medications Discontinued During This Encounter   Medication Reason    ranitidine (ZANTAC) 150 MG tablet LIST CLEANUP     Return in about 6 months (around 5/5/2021), or if symptoms worsen or fail to improve, for follow up on medicaiton, repeat labs.      Julissa Baeza PA-C

## 2020-11-11 ENCOUNTER — HOSPITAL ENCOUNTER (OUTPATIENT)
Dept: NON INVASIVE DIAGNOSTICS | Age: 85
Discharge: HOME | End: 2020-11-11
Payer: MEDICARE

## 2020-11-11 LAB
EKG ATRIAL RATE: 84 BPM
EKG P AXIS: 63 DEGREES
EKG P-R INTERVAL: 144 MS
EKG Q-T INTERVAL: 386 MS
EKG QRS DURATION: 80 MS
EKG QTC CALCULATION (BAZETT): 456 MS
EKG R AXIS: 4 DEGREES
EKG T AXIS: 62 DEGREES
EKG VENTRICULAR RATE: 84 BPM

## 2020-11-11 PROCEDURE — 93005 ELECTROCARDIOGRAM TRACING: CPT

## 2020-11-12 PROCEDURE — 93010 ELECTROCARDIOGRAM REPORT: CPT | Performed by: INTERNAL MEDICINE

## 2020-11-13 ENCOUNTER — TELEPHONE (OUTPATIENT)
Dept: FAMILY MEDICINE CLINIC | Age: 85
End: 2020-11-13

## 2020-12-21 RX ORDER — METOPROLOL SUCCINATE 25 MG/1
TABLET, EXTENDED RELEASE ORAL
Qty: 90 TABLET | Refills: 3 | Status: SHIPPED | OUTPATIENT
Start: 2020-12-21 | End: 2021-10-27

## 2020-12-29 RX ORDER — LANSOPRAZOLE 30 MG/1
CAPSULE, DELAYED RELEASE ORAL
Qty: 90 CAPSULE | Refills: 3 | Status: SHIPPED | OUTPATIENT
Start: 2020-12-29 | End: 2021-10-27

## 2021-01-08 DIAGNOSIS — I10 ESSENTIAL HYPERTENSION: ICD-10-CM

## 2021-01-08 RX ORDER — LISINOPRIL 10 MG/1
TABLET ORAL
Qty: 90 TABLET | Refills: 1 | Status: SHIPPED | OUTPATIENT
Start: 2021-01-08 | End: 2021-02-26 | Stop reason: SDUPTHER

## 2021-01-15 ENCOUNTER — OFFICE VISIT (OUTPATIENT)
Dept: FAMILY MEDICINE CLINIC | Age: 86
End: 2021-01-15
Payer: MEDICARE

## 2021-01-15 ENCOUNTER — TELEPHONE (OUTPATIENT)
Dept: FAMILY MEDICINE CLINIC | Age: 86
End: 2021-01-15

## 2021-01-15 VITALS
DIASTOLIC BLOOD PRESSURE: 94 MMHG | RESPIRATION RATE: 16 BRPM | BODY MASS INDEX: 25.95 KG/M2 | HEART RATE: 86 BPM | SYSTOLIC BLOOD PRESSURE: 142 MMHG | OXYGEN SATURATION: 95 % | WEIGHT: 141 LBS | HEIGHT: 62 IN | TEMPERATURE: 98.5 F

## 2021-01-15 DIAGNOSIS — I10 ESSENTIAL HYPERTENSION: ICD-10-CM

## 2021-01-15 DIAGNOSIS — K21.00 GASTROESOPHAGEAL REFLUX DISEASE WITH ESOPHAGITIS, UNSPECIFIED WHETHER HEMORRHAGE: ICD-10-CM

## 2021-01-15 DIAGNOSIS — R26.9 GAIT DISTURBANCE: ICD-10-CM

## 2021-01-15 DIAGNOSIS — M47.26 OSTEOARTHRITIS OF SPINE WITH RADICULOPATHY, LUMBAR REGION: Primary | ICD-10-CM

## 2021-01-15 PROCEDURE — 99213 OFFICE O/P EST LOW 20 MIN: CPT | Performed by: PHYSICIAN ASSISTANT

## 2021-01-15 RX ORDER — TRAMADOL HYDROCHLORIDE 50 MG/1
50 TABLET ORAL EVERY 6 HOURS PRN
Qty: 28 TABLET | Refills: 0 | Status: CANCELLED | OUTPATIENT
Start: 2021-01-15 | End: 2021-01-22

## 2021-01-15 ASSESSMENT — ENCOUNTER SYMPTOMS
EYE DISCHARGE: 0
BACK PAIN: 1
CONSTIPATION: 0
SHORTNESS OF BREATH: 0
ALLERGIC/IMMUNOLOGIC NEGATIVE: 1
WHEEZING: 0
COUGH: 0
CHEST TIGHTNESS: 0
VOMITING: 0
SINUS PRESSURE: 0
DIARRHEA: 0
NAUSEA: 0
SORE THROAT: 0
EYE PAIN: 0
ABDOMINAL PAIN: 0

## 2021-01-15 NOTE — PROGRESS NOTES
Subjective  Carlin Engel, 82 y.o. female presents today with:  Chief Complaint   Patient presents with    Lower Back Pain     Patient complains of having lower back pain, pain radiates down left leg. She was sholving snow the last snow fall we had.  Heartburn     She complains of heartburn when she takes Ibuprofen. HPI  Patient is here along with her daughter with complaint of low back pain for the past month with radiation and weakness to left lower extremity pain began after shoveling snow patient now states she is not having back pain but continues to have left lower extremity weakness  She had been using Advil for symptoms having increasing reflux  And has not been using the Prevacid    Review of Systems   Constitutional: Negative for chills, fatigue and fever. HENT: Negative for congestion, ear discharge, ear pain, sinus pressure, sneezing and sore throat. Eyes: Negative for pain and discharge. Respiratory: Negative for cough, chest tightness, shortness of breath and wheezing. Cardiovascular: Negative for chest pain and leg swelling. Gastrointestinal: Negative for abdominal pain, constipation, diarrhea, nausea and vomiting. HeartBurn   Endocrine: Negative. Genitourinary: Negative for difficulty urinating, dysuria, frequency and urgency. Musculoskeletal: Positive for back pain. Negative for neck pain. Allergic/Immunologic: Negative. Neurological: Negative for dizziness and headaches. Hematological: Negative. Psychiatric/Behavioral: Negative.           Past Medical History:   Diagnosis Date    Anxiety     Arthritis     Chronic back pain     DEGENERATIVE BACK    Depression     Distal radial fracture 2012    GERD (gastroesophageal reflux disease)     Glaucoma     History of mammography, screening 2011 CAT 2 BILAT    History of osteopenia     Hypertension     meds > 8 yrs / denies TIA or stroke    Lumbar radicular pain     Polycythemia vera(238.4)  Rib fracture 11/30/2018    Senile osteoporosis      Past Surgical History:   Procedure Laterality Date    APPENDECTOMY      BACK SURGERY      COLONOSCOPY      ENDOSCOPY, COLON, DIAGNOSTIC      EYE SURGERY      OS eye glaucoma OR    FRACTURE SURGERY Left 05/06/2012    distal radial fracture/Dr. Pablo Colonel    HYSTERECTOMY      [place] PERQ VERTEBROPLASTY UNI/BI INJX CERVICOTHORACIC N/A 3/7/2018    T-11, T-12 VERTEBRAL AUGMENTATION performed by Ted Hu MD at [address] [place] [address] Marital status:       Spouse name: Not on file    Number of children: Not on file    Years of education: Not on file    Highest education level: Not on file   Occupational History    Not on file   Social Needs    Financial resource strain: Not on file    Food insecurity     Worry: Never true     Inability: Never true   [employer] needs     Medical: Not on file     Non-medical: Not on file   Tobacco Use    Smoking status: Never Smoker    Smokeless tobacco: Never Used   Substance and Sexual Activity    Alcohol use: No     Comment: denies    Drug use: No    Sexual activity: Never   Lifestyle    Physical activity     Days per week: Not on file     Minutes per session: Not on file    Stress: Not on file   Relationships    Social connections     Talks on phone: Not on file     Gets together: Not on file     Attends Yazidi service: Not on file     Active member of club or organization: Not on file     Attends meetings of clubs or organizations: Not on file     Relationship status: Not on file    Intimate partner violence     Fear of current or ex partner: Not on file     Emotionally abused: Not on file     Physically abused: Not on file     Forced sexual activity: Not on file   Other Topics Concern    Not on file   Social History Narrative    Not on file     Family History   Problem Relation Age of Onset    Breast Cancer Daughter     Cancer Daughter         kidney cancer  Diabetes Mother     Diabetes Father     High Cholesterol Sister     Other Sister          of old age   [facility] Other Brother         accident / car fall on him    Cancer Son         prostate cancer        Allergies   Allergen Reactions    Hydromorphone Other (See Comments)    Dilaudid [Fd&C Blue #1 Al Cornejo-Hydromorphone] Other (See Comments)     crazziness     Current Outpatient Medications   Medication Sig Dispense Refill    lisinopril (PRINIVIL;ZESTRIL) 10 MG tablet TAKE ONE TABLET BY MOUTH EVERY DAY 90 tablet 1    lansoprazole (PREVACID) 30 MG delayed release capsule TAKE ONE CAPSULE BY MOUTH EVERY DAY 90 capsule 3    metoprolol succinate (TOPROL XL) 25 MG extended release tablet TAKE ONE TABLET BY MOUTH EVERY DAY 90 tablet 3    diclofenac sodium (VOLTAREN) 1 % GEL Apply 4 g topically 4 times daily 4 Tube 3    Handicap Placard MISC by Does not apply route Start date:  2019 Expiration date 2024 1 each 0    ibandronate (BONIVA) 150 MG tablet Take 1 tablet by mouth every 30 days Take one (1) tablet once per month in the morning with a full glass of water, on an empty stomach, and do not take anything else by mouth or lie down for the next 30 minutes. 4 tablet 11    ibuprofen (ADVIL;MOTRIN) 800 MG tablet TAKE ONE TABLET BY MOUTH EVERY DAY WITH FOOD AS NEEDED FOR PAIN 30 tablet 2    Nutritional Supplements (ENSURE COMPLETE SHAKE) LIQD Take 1 Can by mouth 3 times daily Strawberry or chocolate flavor 90 Bottle 5    bimatoprost (LUMIGAN) 0.01 % SOLN ophthalmic drops Place 1 drop into both eyes every evening       No current facility-administered medications for this visit.         Objective    Vitals:    01/15/21 1441 01/15/21 1449   BP: (!) 144/98 (!) 142/94   Site: Right Upper Arm Right Upper Arm   Position: Sitting Sitting   Cuff Size: Small Adult Small Adult   Pulse: 86    Resp: 16    Temp: 98.5 °F (36.9 °C)    TempSrc: Oral    SpO2: 95%    Weight: 141 lb (64 kg) Height: 5' 2\" (1.575 m)      Physical Exam  Constitutional:       General: She is not in acute distress. Appearance: She is obese. She is not ill-appearing. HENT:      Head: Normocephalic and atraumatic. Mouth/Throat:      Mouth: Mucous membranes are moist.   Eyes:      Conjunctiva/sclera: Conjunctivae normal.      Pupils: Pupils are equal, round, and reactive to light. Neck:      Musculoskeletal: Normal range of motion and neck supple. Thyroid: No thyromegaly. Cardiovascular:      Rate and Rhythm: Normal rate and regular rhythm. Heart sounds: Normal heart sounds. No murmur. Pulmonary:      Effort: Pulmonary effort is normal. No respiratory distress. Breath sounds: Normal breath sounds. No wheezing. Abdominal:      General: Bowel sounds are normal.      Palpations: Abdomen is soft. There is no mass. Tenderness: There is no abdominal tenderness. There is no guarding. Musculoskeletal: Normal range of motion. General: Tenderness and deformity present. Lymphadenopathy:      Cervical: No cervical adenopathy. Skin:     General: Skin is warm and dry. Coloration: Skin is not jaundiced or pale. Neurological:      General: No focal deficit present. Mental Status: She is alert and oriented to person, place, and time. Cranial Nerves: No cranial nerve deficit. Motor: No weakness. Coordination: Coordination normal.      Gait: Gait abnormal.      Comments: Needs support rising from sitting in walking   Psychiatric:         Mood and Affect: Mood normal.         Behavior: Behavior normal.         Thought Content: Thought content normal.         Judgment: Judgment normal.              Assessment & Plan    Diagnosis Orders   1. Osteoarthritis of spine with radiculopathy, lumbar region  XR LUMBAR SPINE (MIN 4 VIEWS)   2. Essential hypertension     3. Gait disturbance     4.  Gastroesophageal reflux disease with esophagitis, unspecified whether hemorrhage

## 2021-01-15 NOTE — TELEPHONE ENCOUNTER
Daughter Aric Oshea called requesting results of Xray. Per Julissa Cabello it showed Moderate degree to arthritis and negative for fracture. Daughter gave good verbal understanding and no further questions asked.

## 2021-01-19 DIAGNOSIS — M80.00XS AGE-RELATED OSTEOPOROSIS WITH CURRENT PATHOLOGICAL FRACTURE, SEQUELA: ICD-10-CM

## 2021-01-19 RX ORDER — IBANDRONATE SODIUM 150 MG/1
TABLET, FILM COATED ORAL
Qty: 4 TABLET | Refills: 11 | Status: SHIPPED | OUTPATIENT
Start: 2021-01-19 | End: 2021-11-12 | Stop reason: SDUPTHER

## 2021-02-26 ENCOUNTER — OFFICE VISIT (OUTPATIENT)
Dept: FAMILY MEDICINE CLINIC | Age: 86
End: 2021-02-26
Payer: MEDICARE

## 2021-02-26 VITALS
HEIGHT: 63 IN | HEART RATE: 77 BPM | BODY MASS INDEX: 24.8 KG/M2 | WEIGHT: 140 LBS | SYSTOLIC BLOOD PRESSURE: 164 MMHG | RESPIRATION RATE: 16 BRPM | TEMPERATURE: 98.1 F | OXYGEN SATURATION: 98 % | DIASTOLIC BLOOD PRESSURE: 101 MMHG

## 2021-02-26 DIAGNOSIS — K21.9 GASTROESOPHAGEAL REFLUX DISEASE, UNSPECIFIED WHETHER ESOPHAGITIS PRESENT: ICD-10-CM

## 2021-02-26 DIAGNOSIS — I10 ESSENTIAL HYPERTENSION: ICD-10-CM

## 2021-02-26 DIAGNOSIS — M25.511 PAIN IN JOINT OF RIGHT SHOULDER: Primary | ICD-10-CM

## 2021-02-26 PROCEDURE — 96372 THER/PROPH/DIAG INJ SC/IM: CPT | Performed by: PHYSICIAN ASSISTANT

## 2021-02-26 PROCEDURE — 99212 OFFICE O/P EST SF 10 MIN: CPT | Performed by: PHYSICIAN ASSISTANT

## 2021-02-26 RX ORDER — LISINOPRIL 20 MG/1
TABLET ORAL
Qty: 90 TABLET | Refills: 1 | Status: SHIPPED
Start: 2021-02-26 | End: 2021-11-12

## 2021-02-26 RX ORDER — KETOROLAC TROMETHAMINE 30 MG/ML
60 INJECTION, SOLUTION INTRAMUSCULAR; INTRAVENOUS ONCE
Status: COMPLETED | OUTPATIENT
Start: 2021-02-26 | End: 2021-02-26

## 2021-02-26 RX ADMIN — KETOROLAC TROMETHAMINE 60 MG: 30 INJECTION, SOLUTION INTRAMUSCULAR; INTRAVENOUS at 15:13

## 2021-02-26 NOTE — PROGRESS NOTES
per week: Not on file     Minutes per session: Not on file    Stress: Not on file   Relationships    Social connections     Talks on phone: Not on file     Gets together: Not on file     Attends Christianity service: Not on file     Active member of club or organization: Not on file     Attends meetings of clubs or organizations: Not on file     Relationship status: Not on file    Intimate partner violence     Fear of current or ex partner: Not on file     Emotionally abused: Not on file     Physically abused: Not on file     Forced sexual activity: Not on file   Other Topics Concern    Not on file   Social History Narrative    Not on file     Family History   Problem Relation Age of Onset    Breast Cancer Daughter     Cancer Daughter         kidney cancer    Diabetes Mother     Diabetes Father     High Cholesterol Sister     Other Sister          of old age   Ralph Celeste Other Brother         accident / car fall on him    Cancer Son         prostate cancer        Allergies   Allergen Reactions    Hydromorphone Other (See Comments)    Dilaudid [Fd&C Blue #1 Al Cornejo-Hydromorphone] Other (See Comments)     crazziness     Current Outpatient Medications   Medication Sig Dispense Refill    ibandronate (BONIVA) 150 MG tablet TAKE 1 TABLET BY MOUTH EVERY 30 DAYS IN THE MORNING WITH A FULL GLASS OF WATER ON EMPTY STOMACH.  DO NOT TAKE ANYTHING ELSE OR LIE DOWN FOR 30 MINUTES 4 tablet 11    lisinopril (PRINIVIL;ZESTRIL) 10 MG tablet TAKE ONE TABLET BY MOUTH EVERY DAY 90 tablet 1    lansoprazole (PREVACID) 30 MG delayed release capsule TAKE ONE CAPSULE BY MOUTH EVERY DAY 90 capsule 3    metoprolol succinate (TOPROL XL) 25 MG extended release tablet TAKE ONE TABLET BY MOUTH EVERY DAY 90 tablet 3    diclofenac sodium (VOLTAREN) 1 % GEL Apply 4 g topically 4 times daily 4 Tube 3    Handicap Placard MISC by Does not apply route Start date:  2019 Expiration date 2024 1 each 0    ibuprofen (ADVIL;MOTRIN) 800 MG tablet TAKE ONE TABLET BY MOUTH EVERY DAY WITH FOOD AS NEEDED FOR PAIN 30 tablet 2    Nutritional Supplements (ENSURE COMPLETE SHAKE) LIQD Take 1 Can by mouth 3 times daily Strawberry or chocolate flavor 90 Bottle 5    bimatoprost (LUMIGAN) 0.01 % SOLN ophthalmic drops Place 1 drop into both eyes every evening       No current facility-administered medications for this visit. Objective    There were no vitals filed for this visit. Physical Exam  Vitals signs reviewed. Constitutional:       Appearance: Normal appearance. HENT:      Head: Normocephalic and atraumatic. Eyes:      Extraocular Movements: Extraocular movements intact. Conjunctiva/sclera: Conjunctivae normal.   Cardiovascular:      Rate and Rhythm: Normal rate and regular rhythm. Pulmonary:      Effort: Pulmonary effort is normal. No respiratory distress. Breath sounds: No wheezing. Abdominal:      Tenderness: There is abdominal tenderness in the right upper quadrant, epigastric area and left upper quadrant. Musculoskeletal:         General: Tenderness present. Skin:     General: Skin is warm and dry. Neurological:      Mental Status: She is alert and oriented to person, place, and time. Psychiatric:         Mood and Affect: Mood normal.         Thought Content:  Thought content normal.         Judgment: Judgment normal.

## 2021-03-01 ENCOUNTER — TELEPHONE (OUTPATIENT)
Dept: FAMILY MEDICINE CLINIC | Age: 86
End: 2021-03-01

## 2021-03-01 NOTE — TELEPHONE ENCOUNTER
Daughter called to inform the doctor that she found out that last Friday 02/26/21 the patient did not take her BP meds before her doctor's appointment. She wants to find out if that is the reason why her BP was high when she was seen by  the doctor? Can she take her Lisinopril 10 mg instead of the 20 mg? Daughter is requesting a call back for some advise at [phone].

## 2021-03-23 ENCOUNTER — TELEPHONE (OUTPATIENT)
Dept: FAMILY MEDICINE CLINIC | Age: 86
End: 2021-03-23

## 2021-03-23 NOTE — TELEPHONE ENCOUNTER
Patient daughter calling, mom has stable BP since last appt and has been taking her meds regularly. ..wants to know if necessary to keep f/u on 3/26 or can wait til May appt? Please advise.

## 2021-03-24 NOTE — TELEPHONE ENCOUNTER
Called the patient and spoke to daughter, Cancelled appointment for 3/26 and Patient will be seen in may.

## 2021-05-07 ENCOUNTER — OFFICE VISIT (OUTPATIENT)
Dept: FAMILY MEDICINE CLINIC | Age: 86
End: 2021-05-07
Payer: MEDICARE

## 2021-05-07 VITALS
HEART RATE: 81 BPM | OXYGEN SATURATION: 97 % | WEIGHT: 141 LBS | DIASTOLIC BLOOD PRESSURE: 80 MMHG | BODY MASS INDEX: 24.98 KG/M2 | TEMPERATURE: 97.9 F | HEIGHT: 63 IN | SYSTOLIC BLOOD PRESSURE: 130 MMHG | RESPIRATION RATE: 16 BRPM

## 2021-05-07 DIAGNOSIS — Z71.9 ENCOUNTER FOR CONSULTATION: ICD-10-CM

## 2021-05-07 DIAGNOSIS — K21.9 GASTROESOPHAGEAL REFLUX DISEASE, UNSPECIFIED WHETHER ESOPHAGITIS PRESENT: ICD-10-CM

## 2021-05-07 DIAGNOSIS — I10 ESSENTIAL HYPERTENSION: ICD-10-CM

## 2021-05-07 DIAGNOSIS — M25.511 ACUTE PAIN OF RIGHT SHOULDER: Primary | ICD-10-CM

## 2021-05-07 PROCEDURE — 99214 OFFICE O/P EST MOD 30 MIN: CPT | Performed by: PHYSICIAN ASSISTANT

## 2021-05-07 NOTE — PROGRESS NOTES
Subjective  Carlin Engel, 82 y.o. female presents today with:  Chief Complaint   Patient presents with    Shoulder Pain     PT. is here today for a 6 month follow up for pain in the joint of the right shoulder 8/10 pain rate. She takes ibuprofen with moderate relief. HPI    Here for f.u on htn denies chest pain pressure shortness of breath  Pt was forgetting to take her pills but has been consistent with them since last visit  Complains of right shoulder pain recently improved with heat gentle exercises and lidocaine also having some reflux acid but admits to not taking the Prilosec and drinking more coffee denies rectal bleeding  Labs still outstanding she forgot to get them done  She has some questions regarding the Covid vaccine  Review of Systems   Musculoskeletal: Positive for arthralgias and myalgias. All other systems reviewed and are negative. Past Medical History:   Diagnosis Date    Anxiety     Arthritis     Chronic back pain     DEGENERATIVE BACK    Depression     Distal radial fracture 2012    GERD (gastroesophageal reflux disease)     Glaucoma     History of mammography, screening 2011 CAT 2 BILAT    History of osteopenia     Hypertension     meds > 8 yrs / denies TIA or stroke    Lumbar radicular pain     Polycythemia vera(238.4)     Rib fracture 11/30/2018    Senile osteoporosis      Past Surgical History:   Procedure Laterality Date    APPENDECTOMY      BACK SURGERY      COLONOSCOPY      ENDOSCOPY, COLON, DIAGNOSTIC      EYE SURGERY      OS eye glaucoma OR    FRACTURE SURGERY Left 05/06/2012    distal radial fracture/Dr. Kay Rice    HYSTERECTOMY      [place] PERQ VERTEBROPLASTY UNI/BI INJX CERVICOTHORACIC N/A 3/7/2018    T-11, T-12 VERTEBRAL AUGMENTATION performed by Criss Woods MD at [address] [place] [address] Marital status:       Spouse name: Not on file    Number of children: Not on file    Years of education: Not on file  Highest education level: Not on file   Occupational History    Not on file   Social Needs    Financial resource strain: Not on file    Food insecurity     Worry: Never true     Inability: Never true   [employer] needs     Medical: Not on file     Non-medical: Not on file   Tobacco Use    Smoking status: Never Smoker    Smokeless tobacco: Never Used   Substance and Sexual Activity    Alcohol use: No     Comment: denies    Drug use: No    Sexual activity: Never   Lifestyle    Physical activity     Days per week: Not on file     Minutes per session: Not on file    Stress: Not on file   Relationships    Social connections     Talks on phone: Not on file     Gets together: Not on file     Attends Hinduism service: Not on file     Active member of club or organization: Not on file     Attends meetings of clubs or organizations: Not on file     Relationship status: Not on file    Intimate partner violence     Fear of current or ex partner: Not on file     Emotionally abused: Not on file     Physically abused: Not on file     Forced sexual activity: Not on file   Other Topics Concern    Not on file   Social History Narrative    Not on file     Family History   Problem Relation Age of Onset    Breast Cancer Daughter     Cancer Daughter         kidney cancer    Diabetes Mother     Diabetes Father     High Cholesterol Sister     Other Sister          of old age   Laryunior Waldron Other Brother         accident / car fall on him    Cancer Son         prostate cancer        Allergies   Allergen Reactions    Hydromorphone Other (See Comments)    Dilaudid [Fd&C Blue #1 Al Cornejo-Hydromorphone] Other (See Comments)     crazziness     Current Outpatient Medications   Medication Sig Dispense Refill    lisinopril (PRINIVIL;ZESTRIL) 20 MG tablet TAKE ONE TABLET BY MOUTH EVERY DAY 90 tablet 1    ibandronate (BONIVA) 150 MG tablet TAKE 1 TABLET BY MOUTH EVERY 30 DAYS IN THE MORNING WITH A FULL GLASS OF WATER ON EMPTY STOMACH. DO NOT TAKE ANYTHING ELSE OR LIE DOWN FOR 30 MINUTES 4 tablet 11    lansoprazole (PREVACID) 30 MG delayed release capsule TAKE ONE CAPSULE BY MOUTH EVERY DAY 90 capsule 3    metoprolol succinate (TOPROL XL) 25 MG extended release tablet TAKE ONE TABLET BY MOUTH EVERY DAY 90 tablet 3    diclofenac sodium (VOLTAREN) 1 % GEL Apply 4 g topically 4 times daily 4 Tube 3    Handicap Placard MISC by Does not apply route Start date:  12/18/2019 Expiration date 12/18/2024 1 each 0    ibuprofen (ADVIL;MOTRIN) 800 MG tablet TAKE ONE TABLET BY MOUTH EVERY DAY WITH FOOD AS NEEDED FOR PAIN 30 tablet 2    Nutritional Supplements (ENSURE COMPLETE SHAKE) LIQD Take 1 Can by mouth 3 times daily Strawberry or chocolate flavor 90 Bottle 5    bimatoprost (LUMIGAN) 0.01 % SOLN ophthalmic drops Place 1 drop into both eyes every evening       No current facility-administered medications for this visit. Objective    Vitals:    05/07/21 1359   BP: 130/80   Pulse: 81   Resp: 16   Temp: 97.9 °F (36.6 °C)   TempSrc: Oral   SpO2: 97%   Weight: 141 lb (64 kg)   Height: 5' 3\" (1.6 m)     Physical Exam  Constitutional:       General: She is not in acute distress. Appearance: She is obese. She is not ill-appearing. HENT:      Head: Normocephalic and atraumatic. Right Ear: External ear normal.   Eyes:      Conjunctiva/sclera: Conjunctivae normal.      Pupils: Pupils are equal, round, and reactive to light. Neck:      Musculoskeletal: Normal range of motion and neck supple. Thyroid: No thyromegaly. Cardiovascular:      Rate and Rhythm: Normal rate and regular rhythm. Heart sounds: Normal heart sounds. No murmur. Pulmonary:      Effort: Pulmonary effort is normal. No respiratory distress. Breath sounds: Normal breath sounds. No wheezing. Abdominal:      General: Bowel sounds are normal.      Palpations: Abdomen is soft. There is no mass. Tenderness: There is no abdominal tenderness. There is no guarding. Musculoskeletal: Normal range of motion. Comments: Kyphosis  Good rom right shoulder    Lymphadenopathy:      Cervical: No cervical adenopathy. Skin:     General: Skin is warm and dry. Coloration: Skin is not jaundiced or pale. Neurological:      General: No focal deficit present. Mental Status: She is alert and oriented to person, place, and time. Cranial Nerves: No cranial nerve deficit. Motor: No weakness. Coordination: Coordination normal.      Gait: Gait normal.   Psychiatric:         Mood and Affect: Mood normal.         Behavior: Behavior normal.         Thought Content: Thought content normal.         Judgment: Judgment normal.              Assessment & Plan    Diagnosis Orders   1. Acute pain of right shoulder     2. Essential hypertension      controlled   3. Gastroesophageal reflux disease, unspecified whether esophagitis present     4. Encounter for consultation      encouraged covid vaccination - pt plans to schedule         No orders of the defined types were placed in this encounter. No orders of the defined types were placed in this encounter. There are no discontinued medications. Return in about 6 months (around 11/7/2021).     Julissa Baeza PA-C

## 2021-05-24 DIAGNOSIS — I10 ESSENTIAL HYPERTENSION: ICD-10-CM

## 2021-05-24 RX ORDER — LISINOPRIL 10 MG/1
TABLET ORAL
Qty: 90 TABLET | Refills: 1 | Status: SHIPPED | OUTPATIENT
Start: 2021-05-24 | End: 2021-11-09

## 2021-11-09 DIAGNOSIS — I10 ESSENTIAL HYPERTENSION: ICD-10-CM

## 2021-11-09 LAB
ALBUMIN SERPL-MCNC: 4.8 G/DL (ref 3.5–4.6)
ALP BLD-CCNC: 65 U/L (ref 40–130)
ALT SERPL-CCNC: 14 U/L (ref 0–33)
ANION GAP SERPL CALCULATED.3IONS-SCNC: 17 MEQ/L (ref 9–15)
AST SERPL-CCNC: 27 U/L (ref 0–35)
BASOPHILS ABSOLUTE: 0 K/UL (ref 0–0.2)
BASOPHILS RELATIVE PERCENT: 0.6 %
BILIRUB SERPL-MCNC: 0.8 MG/DL (ref 0.2–0.7)
BUN BLDV-MCNC: 10 MG/DL (ref 8–23)
CALCIUM SERPL-MCNC: 9.4 MG/DL (ref 8.5–9.9)
CHLORIDE BLD-SCNC: 102 MEQ/L (ref 95–107)
CHOLESTEROL, FASTING: 177 MG/DL (ref 0–199)
CO2: 24 MEQ/L (ref 20–31)
CREAT SERPL-MCNC: 0.64 MG/DL (ref 0.5–0.9)
EOSINOPHILS ABSOLUTE: 0.1 K/UL (ref 0–0.7)
EOSINOPHILS RELATIVE PERCENT: 1.5 %
GFR AFRICAN AMERICAN: >60
GFR NON-AFRICAN AMERICAN: >60
GLOBULIN: 2.3 G/DL (ref 2.3–3.5)
GLUCOSE BLD-MCNC: 89 MG/DL (ref 70–99)
HCT VFR BLD CALC: 47.8 % (ref 37–47)
HDLC SERPL-MCNC: 84 MG/DL (ref 40–59)
HEMOGLOBIN: 16.1 G/DL (ref 12–16)
LDL CHOLESTEROL CALCULATED: 78 MG/DL (ref 0–129)
LYMPHOCYTES ABSOLUTE: 1.3 K/UL (ref 1–4.8)
LYMPHOCYTES RELATIVE PERCENT: 26.2 %
MCH RBC QN AUTO: 32.5 PG (ref 27–31.3)
MCHC RBC AUTO-ENTMCNC: 33.6 % (ref 33–37)
MCV RBC AUTO: 96.6 FL (ref 82–100)
MONOCYTES ABSOLUTE: 0.5 K/UL (ref 0.2–0.8)
MONOCYTES RELATIVE PERCENT: 10 %
NEUTROPHILS ABSOLUTE: 3.1 K/UL (ref 1.4–6.5)
NEUTROPHILS RELATIVE PERCENT: 61.7 %
PDW BLD-RTO: 14.1 % (ref 11.5–14.5)
PLATELET # BLD: 284 K/UL (ref 130–400)
POTASSIUM SERPL-SCNC: 4.4 MEQ/L (ref 3.4–4.9)
RBC # BLD: 4.95 M/UL (ref 4.2–5.4)
SODIUM BLD-SCNC: 143 MEQ/L (ref 135–144)
TOTAL PROTEIN: 7.1 G/DL (ref 6.3–8)
TRIGLYCERIDE, FASTING: 77 MG/DL (ref 0–150)
WBC # BLD: 5 K/UL (ref 4.8–10.8)

## 2021-11-09 RX ORDER — LISINOPRIL 10 MG/1
TABLET ORAL
Qty: 90 TABLET | Refills: 1 | Status: SHIPPED | OUTPATIENT
Start: 2021-11-09 | End: 2022-04-19

## 2021-11-12 ENCOUNTER — OFFICE VISIT (OUTPATIENT)
Dept: FAMILY MEDICINE CLINIC | Age: 86
End: 2021-11-12
Payer: MEDICARE

## 2021-11-12 VITALS
TEMPERATURE: 97.6 F | HEIGHT: 62 IN | SYSTOLIC BLOOD PRESSURE: 158 MMHG | BODY MASS INDEX: 25.76 KG/M2 | WEIGHT: 140 LBS | HEART RATE: 68 BPM | DIASTOLIC BLOOD PRESSURE: 100 MMHG | OXYGEN SATURATION: 97 %

## 2021-11-12 DIAGNOSIS — Z23 NEED FOR INFLUENZA VACCINATION: ICD-10-CM

## 2021-11-12 DIAGNOSIS — I10 ESSENTIAL HYPERTENSION: Primary | ICD-10-CM

## 2021-11-12 DIAGNOSIS — M80.00XS AGE-RELATED OSTEOPOROSIS WITH CURRENT PATHOLOGICAL FRACTURE, SEQUELA: ICD-10-CM

## 2021-11-12 DIAGNOSIS — K21.00 GASTROESOPHAGEAL REFLUX DISEASE WITH ESOPHAGITIS, UNSPECIFIED WHETHER HEMORRHAGE: ICD-10-CM

## 2021-11-12 PROCEDURE — G0008 ADMIN INFLUENZA VIRUS VAC: HCPCS | Performed by: PHYSICIAN ASSISTANT

## 2021-11-12 PROCEDURE — 99214 OFFICE O/P EST MOD 30 MIN: CPT | Performed by: PHYSICIAN ASSISTANT

## 2021-11-12 PROCEDURE — 90694 VACC AIIV4 NO PRSRV 0.5ML IM: CPT | Performed by: PHYSICIAN ASSISTANT

## 2021-11-12 RX ORDER — METOPROLOL SUCCINATE 25 MG/1
TABLET, EXTENDED RELEASE ORAL
Qty: 90 TABLET | Refills: 3 | Status: SHIPPED | OUTPATIENT
Start: 2021-11-12

## 2021-11-12 RX ORDER — IBANDRONATE SODIUM 150 MG/1
TABLET, FILM COATED ORAL
Qty: 4 TABLET | Refills: 11 | Status: SHIPPED | OUTPATIENT
Start: 2021-11-12

## 2021-11-12 RX ORDER — LANSOPRAZOLE 30 MG/1
CAPSULE, DELAYED RELEASE ORAL
Qty: 90 CAPSULE | Refills: 3 | Status: SHIPPED | OUTPATIENT
Start: 2021-11-12

## 2021-11-12 SDOH — ECONOMIC STABILITY: TRANSPORTATION INSECURITY
IN THE PAST 12 MONTHS, HAS THE LACK OF TRANSPORTATION KEPT YOU FROM MEDICAL APPOINTMENTS OR FROM GETTING MEDICATIONS?: NO

## 2021-11-12 SDOH — ECONOMIC STABILITY: FOOD INSECURITY: WITHIN THE PAST 12 MONTHS, YOU WORRIED THAT YOUR FOOD WOULD RUN OUT BEFORE YOU GOT MONEY TO BUY MORE.: NEVER TRUE

## 2021-11-12 SDOH — ECONOMIC STABILITY: TRANSPORTATION INSECURITY
IN THE PAST 12 MONTHS, HAS LACK OF TRANSPORTATION KEPT YOU FROM MEETINGS, WORK, OR FROM GETTING THINGS NEEDED FOR DAILY LIVING?: NO

## 2021-11-12 SDOH — ECONOMIC STABILITY: FOOD INSECURITY: WITHIN THE PAST 12 MONTHS, THE FOOD YOU BOUGHT JUST DIDN'T LAST AND YOU DIDN'T HAVE MONEY TO GET MORE.: NEVER TRUE

## 2021-11-12 ASSESSMENT — ENCOUNTER SYMPTOMS
CONSTIPATION: 0
ABDOMINAL PAIN: 0
SINUS PAIN: 0
NAUSEA: 0
ALLERGIC/IMMUNOLOGIC NEGATIVE: 1
EYE DISCHARGE: 0
WHEEZING: 0
CHEST TIGHTNESS: 0
SINUS PRESSURE: 0
DIARRHEA: 0
SORE THROAT: 0
VOMITING: 0
COUGH: 0
BACK PAIN: 1
SHORTNESS OF BREATH: 0
EYE PAIN: 0

## 2021-11-12 ASSESSMENT — SOCIAL DETERMINANTS OF HEALTH (SDOH): HOW HARD IS IT FOR YOU TO PAY FOR THE VERY BASICS LIKE FOOD, HOUSING, MEDICAL CARE, AND HEATING?: NOT HARD AT ALL

## 2021-11-12 NOTE — PROGRESS NOTES
Subjective  Carlin Engel, 82 y.o. female presents today with:  Chief Complaint   Patient presents with    Hypertension     4 month follow up. She had labs done on 11/9/2021.  Hyperlipidemia     4 month follow up       HPI  Patient is here along with her daughter to review recent labs and fu on chronic conditions-   states she is feels well overall denies chest pain pressure shortness of breath fatigue      Review of Systems   Constitutional: Negative for chills, fatigue and fever. HENT: Negative for congestion, ear discharge, ear pain, sinus pressure, sinus pain and sore throat. Eyes: Negative for pain and discharge. Respiratory: Negative for cough, chest tightness, shortness of breath and wheezing. Cardiovascular: Negative for chest pain and leg swelling. Gastrointestinal: Negative for abdominal pain, constipation, diarrhea, nausea and vomiting. Endocrine: Negative. Genitourinary: Negative for difficulty urinating, dysuria, frequency and urgency. Musculoskeletal: Positive for arthralgias and back pain. Negative for neck pain. Skin: Negative for rash. Allergic/Immunologic: Negative. Neurological: Negative for dizziness and headaches. Hematological: Negative. Psychiatric/Behavioral: Negative. All other systems reviewed and are negative.         Past Medical History:   Diagnosis Date    Anxiety     Arthritis     Chronic back pain     DEGENERATIVE BACK    Depression     Distal radial fracture 2012    GERD (gastroesophageal reflux disease)     Glaucoma     History of mammography, screening 2011 CAT 2 BILAT    History of osteopenia     Hypertension     meds > 8 yrs / denies TIA or stroke    Lumbar radicular pain     Polycythemia vera(238.4)     Rib fracture 11/30/2018    Senile osteoporosis      Past Surgical History:   Procedure Laterality Date    APPENDECTOMY      BACK SURGERY      COLONOSCOPY      ENDOSCOPY, COLON, DIAGNOSTIC      EYE SURGERY      OS eye glaucoma OR    FRACTURE SURGERY Left 05/06/2012    distal radial fracture/Dr. Idris Henning    HYSTERECTOMY      ME PERQ VERTEBROPLASTY UNI/BI INJX CERVICOTHORACIC N/A 3/7/2018    T-11, T-12 VERTEBRAL AUGMENTATION performed by Norris Murguia MD at [address] [place] [address] Marital status:      Spouse name: Not on file    Number of children: Not on file    Years of education: Not on file    Highest education level: Not on file   Occupational History    Not on file   Tobacco Use    Smoking status: Never Smoker    Smokeless tobacco: Never Used   Substance and Sexual Activity    Alcohol use: No     Comment: denies    Drug use: No    Sexual activity: Never   Other Topics Concern    Not on file   Social History Narrative    Not on file     Social Determinants of Health     Financial Resource Strain: Low Risk     Difficulty of Paying Living Expenses: Not hard at all   Food Insecurity: No Food Insecurity    Worried About Running Out of Food in the Last Year: Never true    Yola of Food in the Last Year: Never true   Transportation Needs: No Transportation Needs    Lack of Transportation (Medical): No    Lack of Transportation (Non-Medical):  No   Physical Activity:     Days of Exercise per Week: Not on file    Minutes of Exercise per Session: Not on file   Stress:     Feeling of Stress : Not on file   Social Connections:     Frequency of Communication with Friends and Family: Not on file    Frequency of Social Gatherings with Friends and Family: Not on file    Attends Baptism Services: Not on file    Active Member of Clubs or Organizations: Not on file    Attends Club or Organization Meetings: Not on file    Marital Status: Not on file   Intimate Partner Violence:     Fear of Current or Ex-Partner: Not on file    Emotionally Abused: Not on file    Physically Abused: Not on file    Sexually Abused: Not on file   Housing Stability:     Unable to Pay for Housing in the Last Year: Not on file    Number of Places Lived in the Last Year: Not on file    Unstable Housing in the Last Year: Not on file     Family History   Problem Relation Age of Onset    Breast Cancer Daughter     Cancer Daughter         kidney cancer    Diabetes Mother     Diabetes Father     High Cholesterol Sister     Other Sister          of old age   Debera  Other Brother         accident / car fall on him    Cancer Son         prostate cancer        Allergies   Allergen Reactions    Hydromorphone Other (See Comments)    Dilaudid [Fd&C Blue #1 Al Cornejo-Hydromorphone] Other (See Comments)     crazziness     Current Outpatient Medications   Medication Sig Dispense Refill    metoprolol succinate (TOPROL XL) 25 MG extended release tablet TAKE ONE TABLET BY MOUTH EVERY DAY 90 tablet 3    lansoprazole (PREVACID) 30 MG delayed release capsule Take on tablet daily 90 capsule 3    ibandronate (BONIVA) 150 MG tablet TAKE 1 TABLET BY MOUTH EVERY 30 DAYS IN THE MORNING WITH A FULL GLASS OF WATER ON EMPTY STOMACH. DO NOT TAKE ANYTHING ELSE OR LIE DOWN FOR 30 MINUTES 4 tablet 11    lisinopril (PRINIVIL;ZESTRIL) 10 MG tablet TAKE ONE TABLET BY MOUTH EVERY DAY 90 tablet 1    diclofenac sodium (VOLTAREN) 1 % GEL Apply 4 g topically 4 times daily 4 Tube 3    Handicap Placard MISC by Does not apply route Start date:  2019 Expiration date 2024 1 each 0    ibuprofen (ADVIL;MOTRIN) 800 MG tablet TAKE ONE TABLET BY MOUTH EVERY DAY WITH FOOD AS NEEDED FOR PAIN 30 tablet 2    Nutritional Supplements (ENSURE COMPLETE SHAKE) LIQD Take 1 Can by mouth 3 times daily Strawberry or chocolate flavor 90 Bottle 5    bimatoprost (LUMIGAN) 0.01 % SOLN ophthalmic drops Place 1 drop into both eyes every evening       No current facility-administered medications for this visit.        Objective    Vitals:    21 1140 21 1149   BP: (!) 158/98 (!) 158/100   Site: Right Upper Arm Right Upper Arm   Position: Sitting Sitting   Cuff Size: Small Adult Medium Adult   Pulse: 68    Temp: 97.6 °F (36.4 °C)    SpO2: 97%    Weight: 140 lb (63.5 kg)    Height: 5' 2\" (1.575 m)      Physical Exam  Constitutional:       General: She is not in acute distress. Appearance: She is obese. She is not ill-appearing. HENT:      Head: Normocephalic and atraumatic. Eyes:      Extraocular Movements: Extraocular movements intact. Conjunctiva/sclera: Conjunctivae normal.      Pupils: Pupils are equal, round, and reactive to light. Neck:      Thyroid: No thyromegaly. Cardiovascular:      Rate and Rhythm: Normal rate and regular rhythm. Heart sounds: Normal heart sounds. No murmur heard. Pulmonary:      Effort: Pulmonary effort is normal. No respiratory distress. Breath sounds: Normal breath sounds. No wheezing, rhonchi or rales. Abdominal:      General: Bowel sounds are normal.      Palpations: Abdomen is soft. There is no mass. Tenderness: There is no abdominal tenderness. There is no guarding. Musculoskeletal:         General: Deformity present. Normal range of motion. Cervical back: Normal range of motion and neck supple. Right lower leg: No edema. Left lower leg: No edema. Comments: kyphosis   Lymphadenopathy:      Cervical: No cervical adenopathy. Skin:     General: Skin is warm and dry. Coloration: Skin is not jaundiced or pale. Neurological:      General: No focal deficit present. Mental Status: She is alert and oriented to person, place, and time. Cranial Nerves: No cranial nerve deficit. Motor: No weakness. Coordination: Coordination normal.      Gait: Gait normal.   Psychiatric:         Mood and Affect: Mood normal.         Behavior: Behavior normal.         Thought Content: Thought content normal.         Judgment: Judgment normal.              Assessment & Plan    Diagnosis Orders   1.  Need for influenza vaccination  INFLUENZA, QUADV, ADJUVANTED, [address] =, IM, PF, PREFILL SYR, 0.5ML (FLUAD)   2. Essential hypertension  metoprolol succinate (TOPROL XL) 25 MG extended release tablet    Comprehensive Metabolic Panel    CBC Auto Differential   3. Gastroesophageal reflux disease with esophagitis  lansoprazole (PREVACID) 30 MG delayed release capsule   4. Age-related osteoporosis with current pathological fracture, sequela  ibandronate (BONIVA) 150 MG tablet         Orders Placed This Encounter   Procedures    INFLUENZA, QUADV, ADJUVANTED, 65 YRS =, IM, PF, PREFILL SYR, 0.5ML (FLUAD)    Comprehensive Metabolic Panel     Standing Status:   Future     Standing Expiration Date:   11/12/2022    CBC Auto Differential     Standing Status:   Future     Standing Expiration Date:   11/12/2022     Orders Placed This Encounter   Medications    metoprolol succinate (TOPROL XL) 25 MG extended release tablet     Sig: TAKE ONE TABLET BY MOUTH EVERY DAY     Dispense:  90 tablet     Refill:  3    lansoprazole (PREVACID) 30 MG delayed release capsule     Sig: Take on tablet daily     Dispense:  90 capsule     Refill:  3    ibandronate (BONIVA) 150 MG tablet     Sig: TAKE 1 TABLET BY MOUTH EVERY 30 DAYS IN THE MORNING WITH A FULL GLASS OF WATER ON EMPTY STOMACH. DO NOT TAKE ANYTHING ELSE OR LIE DOWN FOR 30 MINUTES     Dispense:  4 tablet     Refill:  11     Medications Discontinued During This Encounter   Medication Reason    diclofenac sodium (VOLTAREN) 1 % GEL DUPLICATE    lisinopril (PRINIVIL;ZESTRIL) 20 MG tablet Patient Choice    ibandronate (BONIVA) 150 MG tablet REORDER    metoprolol succinate (TOPROL XL) 25 MG extended release tablet REORDER    lansoprazole (PREVACID) 30 MG delayed release capsule REORDER     Return in about 6 months (around 5/12/2022). Julissa Baeza PA-C    Vaccine Information Sheet, \"Influenza - Inactivated\"  given to Deep Maher, or parent/legal guardian of  Neftali Isadora Krabbe and verbalized understanding.     Patient responses:    Have you ever had a reaction to a flu vaccine? No  Are you able to eat eggs without adverse effects? Yes  Do you have any current illness? No  Have you ever had Guillian Lovell Syndrome? No    Flu vaccine given per order. Please see immunization tab.

## 2022-02-08 ENCOUNTER — OFFICE VISIT (OUTPATIENT)
Dept: FAMILY MEDICINE CLINIC | Age: 87
End: 2022-02-08
Payer: MEDICARE

## 2022-02-08 VITALS
BODY MASS INDEX: 25.95 KG/M2 | SYSTOLIC BLOOD PRESSURE: 142 MMHG | WEIGHT: 141 LBS | HEIGHT: 62 IN | OXYGEN SATURATION: 97 % | TEMPERATURE: 97.1 F | DIASTOLIC BLOOD PRESSURE: 80 MMHG | HEART RATE: 76 BPM

## 2022-02-08 DIAGNOSIS — D48.9 NEOPLASM OF UNCERTAIN BEHAVIOR: ICD-10-CM

## 2022-02-08 DIAGNOSIS — M47.896 OTHER OSTEOARTHRITIS OF SPINE, LUMBAR REGION: Primary | ICD-10-CM

## 2022-02-08 PROCEDURE — 99213 OFFICE O/P EST LOW 20 MIN: CPT | Performed by: PHYSICIAN ASSISTANT

## 2022-02-08 PROCEDURE — 96372 THER/PROPH/DIAG INJ SC/IM: CPT | Performed by: PHYSICIAN ASSISTANT

## 2022-02-08 RX ORDER — TRAMADOL HYDROCHLORIDE 50 MG/1
50 TABLET ORAL 2 TIMES DAILY PRN
Qty: 28 TABLET | Refills: 1 | Status: SHIPPED | OUTPATIENT
Start: 2022-02-08 | End: 2022-02-15

## 2022-02-08 RX ORDER — KETOROLAC TROMETHAMINE 30 MG/ML
60 INJECTION, SOLUTION INTRAMUSCULAR; INTRAVENOUS ONCE
Status: COMPLETED | OUTPATIENT
Start: 2022-02-08 | End: 2022-02-08

## 2022-02-08 RX ADMIN — KETOROLAC TROMETHAMINE 60 MG: 30 INJECTION, SOLUTION INTRAMUSCULAR; INTRAVENOUS at 11:40

## 2022-02-08 ASSESSMENT — ENCOUNTER SYMPTOMS
WHEEZING: 0
CHEST TIGHTNESS: 0
SHORTNESS OF BREATH: 0
ABDOMINAL PAIN: 0
VOMITING: 0
SINUS PRESSURE: 0
EYE PAIN: 0
NAUSEA: 0
DIARRHEA: 0
SINUS PAIN: 0
BACK PAIN: 1
ALLERGIC/IMMUNOLOGIC NEGATIVE: 1
COUGH: 0
EYE DISCHARGE: 0
SORE THROAT: 0
CONSTIPATION: 0

## 2022-02-08 ASSESSMENT — PATIENT HEALTH QUESTIONNAIRE - PHQ9
9. THOUGHTS THAT YOU WOULD BE BETTER OFF DEAD, OR OF HURTING YOURSELF: 0
SUM OF ALL RESPONSES TO PHQ QUESTIONS 1-9: 0
4. FEELING TIRED OR HAVING LITTLE ENERGY: 0
SUM OF ALL RESPONSES TO PHQ9 QUESTIONS 1 & 2: 0
7. TROUBLE CONCENTRATING ON THINGS, SUCH AS READING THE NEWSPAPER OR WATCHING TELEVISION: 0
5. POOR APPETITE OR OVEREATING: 0
SUM OF ALL RESPONSES TO PHQ QUESTIONS 1-9: 0
10. IF YOU CHECKED OFF ANY PROBLEMS, HOW DIFFICULT HAVE THESE PROBLEMS MADE IT FOR YOU TO DO YOUR WORK, TAKE CARE OF THINGS AT HOME, OR GET ALONG WITH OTHER PEOPLE: 0
6. FEELING BAD ABOUT YOURSELF - OR THAT YOU ARE A FAILURE OR HAVE LET YOURSELF OR YOUR FAMILY DOWN: 0
8. MOVING OR SPEAKING SO SLOWLY THAT OTHER PEOPLE COULD HAVE NOTICED. OR THE OPPOSITE, BEING SO FIGETY OR RESTLESS THAT YOU HAVE BEEN MOVING AROUND A LOT MORE THAN USUAL: 0
SUM OF ALL RESPONSES TO PHQ QUESTIONS 1-9: 0
1. LITTLE INTEREST OR PLEASURE IN DOING THINGS: 0
SUM OF ALL RESPONSES TO PHQ QUESTIONS 1-9: 0
2. FEELING DOWN, DEPRESSED OR HOPELESS: 0
3. TROUBLE FALLING OR STAYING ASLEEP: 0

## 2022-02-08 NOTE — PROGRESS NOTES
Subjective  Carlin Engel, 82 y.o. female presents today with:  Chief Complaint   Patient presents with    Mole     She has a mole on her left arm, changing in color and size.  Lower Back Pain     She states her lower back pain is worse. HPI  Discoloration of mole of left forearm for the past week  Low back painn for past 2wks with radiation to bilat les - denies falls change in bowel habits  Review recent labs  Review of Systems   Constitutional: Negative for chills, fatigue and fever. HENT: Negative for congestion, ear discharge, ear pain, sinus pressure, sinus pain and sore throat. Eyes: Negative for pain and discharge. Respiratory: Negative for cough, chest tightness, shortness of breath and wheezing. Cardiovascular: Negative for chest pain and leg swelling. Gastrointestinal: Negative for abdominal pain, constipation, diarrhea, nausea and vomiting. Endocrine: Negative. Genitourinary: Negative for difficulty urinating, dysuria, frequency and urgency. Musculoskeletal: Positive for back pain. Negative for neck pain. Skin: Negative for rash. Allergic/Immunologic: Negative. Neurological: Negative for dizziness and headaches. Hematological: Negative. Psychiatric/Behavioral: Negative.           Past Medical History:   Diagnosis Date    Anxiety     Arthritis     Chronic back pain     DEGENERATIVE BACK    Depression     Distal radial fracture 2012    GERD (gastroesophageal reflux disease)     Glaucoma     History of mammography, screening 2011 CAT 2 BILAT    History of osteopenia     Hypertension     meds > 8 yrs / denies TIA or stroke    Lumbar radicular pain     Polycythemia vera(238.4)     Rib fracture 11/30/2018    Senile osteoporosis      Past Surgical History:   Procedure Laterality Date    APPENDECTOMY      BACK SURGERY      COLONOSCOPY      ENDOSCOPY, COLON, DIAGNOSTIC      EYE SURGERY      OS eye glaucoma OR    FRACTURE SURGERY Left 05/06/2012 distal radial fracture/Dr. Roxanna Bingham    HYSTERECTOMY      [place] PERQ VERTEBROPLASTY UNI/BI INJX CERVICOTHORACIC N/A 3/7/2018    T-11, T-12 VERTEBRAL AUGMENTATION performed by Wong Calderon MD at [address] [place] [address] Marital status:      Spouse name: Not on file    Number of children: Not on file    Years of education: Not on file    Highest education level: Not on file   Occupational History    Not on file   Tobacco Use    Smoking status: Never Smoker    Smokeless tobacco: Never Used   Substance and Sexual Activity    Alcohol use: No     Comment: denies    Drug use: No    Sexual activity: Never   Other Topics Concern    Not on file   Social History Narrative    Not on file     Social Determinants of Health     Financial Resource Strain: Low Risk     Difficulty of Paying Living Expenses: Not hard at all   Food Insecurity: No Food Insecurity    Worried About Running Out of Food in the Last Year: Never true    Yola of Food in the Last Year: Never true   Transportation Needs: No Transportation Needs    Lack of Transportation (Medical): No    Lack of Transportation (Non-Medical):  No   Physical Activity:     Days of Exercise per Week: Not on file    Minutes of Exercise per Session: Not on file   Stress:     Feeling of Stress : Not on file   Social Connections:     Frequency of Communication with Friends and Family: Not on file    Frequency of Social Gatherings with Friends and Family: Not on file    Attends Restorationism Services: Not on file    Active Member of Clubs or Organizations: Not on file    Attends Club or Organization Meetings: Not on file    Marital Status: Not on file   Intimate Partner Violence:     Fear of Current or Ex-Partner: Not on file    Emotionally Abused: Not on file    Physically Abused: Not on file    Sexually Abused: Not on file   Housing Stability:     Unable to Pay for Housing in the Last Year: Not on file    Number of Places Lived in the Last Year: Not on file    Unstable Housing in the Last Year: Not on file     Family History   Problem Relation Age of Onset    Breast Cancer Daughter     Cancer Daughter         kidney cancer    Diabetes Mother     Diabetes Father     High Cholesterol Sister     Other Sister          of old age   Iraheta Other Brother         accident / car fall on him    Cancer Son         prostate cancer        Allergies   Allergen Reactions    Hydromorphone Other (See Comments)    Dilaudid [Fd&C Blue #1 Al Cornejo-Hydromorphone] Other (See Comments)     crazziness     Current Outpatient Medications   Medication Sig Dispense Refill    traMADol (ULTRAM) 50 MG tablet Take 1 tablet by mouth 2 times daily as needed for Pain for up to 7 days. Take 1 tablet by mouth 2 times daily as needed for Pain for up to 3 days. . 28 tablet 1    metoprolol succinate (TOPROL XL) 25 MG extended release tablet TAKE ONE TABLET BY MOUTH EVERY DAY 90 tablet 3    lansoprazole (PREVACID) 30 MG delayed release capsule Take on tablet daily 90 capsule 3    ibandronate (BONIVA) 150 MG tablet TAKE 1 TABLET BY MOUTH EVERY 30 DAYS IN THE MORNING WITH A FULL GLASS OF WATER ON EMPTY STOMACH.  DO NOT TAKE ANYTHING ELSE OR LIE DOWN FOR 30 MINUTES 4 tablet 11    lisinopril (PRINIVIL;ZESTRIL) 10 MG tablet TAKE ONE TABLET BY MOUTH EVERY DAY 90 tablet 1    diclofenac sodium (VOLTAREN) 1 % GEL Apply 4 g topically 4 times daily 4 Tube 3    Handicap Placard MISC by Does not apply route Start date:  2019 Expiration date 2024 1 each 0    ibuprofen (ADVIL;MOTRIN) 800 MG tablet TAKE ONE TABLET BY MOUTH EVERY DAY WITH FOOD AS NEEDED FOR PAIN 30 tablet 2    Nutritional Supplements (ENSURE COMPLETE SHAKE) LIQD Take 1 Can by mouth 3 times daily Strawberry or chocolate flavor 90 Bottle 5    bimatoprost (LUMIGAN) 0.01 % SOLN ophthalmic drops Place 1 drop into both eyes every evening       No current facility-administered medications for this visit. Objective    Vitals:    02/08/22 1049 02/08/22 1101   BP: (!) 148/90 (!) 142/80   Site: Right Upper Arm Left Upper Arm   Position: Sitting Sitting   Cuff Size: Small Adult Small Adult   Pulse: 76    Temp: 97.1 °F (36.2 °C)    TempSrc: Infrared    SpO2: 97%    Weight: 141 lb (64 kg)    Height: 5' 2\" (1.575 m)      Physical Exam  Constitutional:       General: She is in acute distress. HENT:      Head: Normocephalic and atraumatic. Eyes:      Extraocular Movements: Extraocular movements intact. Pupils: Pupils are equal, round, and reactive to light. Pulmonary:      Effort: Pulmonary effort is normal.   Musculoskeletal:         General: Tenderness present. Comments: +slr bilat   Skin:     General: Skin is warm and dry. Neurological:      Mental Status: She is alert and oriented to person, place, and time. Motor: Weakness present. Coordination: Coordination normal.      Gait: Gait normal.   Psychiatric:         Mood and Affect: Mood normal.         Thought Content: Thought content normal.              Assessment & Plan    Diagnosis Orders   1. Other osteoarthritis of spine, lumbar region  traMADol (ULTRAM) 50 MG tablet    ketorolac (TORADOL) injection 60 mg   2. Neoplasm of uncertain behavior  Amb External Referral To Dermatology         Orders Placed This Encounter   Procedures    Amb External Referral To Dermatology     Referral Priority:   Routine     Referral Type:   Eval and Treat     Referral Reason:   Specialty Services Required     Referred to Provider:   Claritza Beasley MD     Requested Specialty:   Dermatology     Number of Visits Requested:   1     Orders Placed This Encounter   Medications    traMADol (ULTRAM) 50 MG tablet     Sig: Take 1 tablet by mouth 2 times daily as needed for Pain for up to 7 days. Take 1 tablet by mouth 2 times daily as needed for Pain for up to 3 days. .     Dispense:  28 tablet     Refill:  1     Reduce doses taken as pain becomes manageable    ketorolac (TORADOL) injection 60 mg     Medications Discontinued During This Encounter   Medication Reason    traMADol (ULTRAM) 50 MG tablet REORDER     Return if symptoms worsen or fail to improve.     Julissa Baeza PA-C

## 2022-04-18 DIAGNOSIS — I10 ESSENTIAL HYPERTENSION: ICD-10-CM

## 2022-04-19 RX ORDER — LISINOPRIL 10 MG/1
TABLET ORAL
Qty: 90 TABLET | Refills: 3 | Status: SHIPPED | OUTPATIENT
Start: 2022-04-19

## 2022-04-19 NOTE — TELEPHONE ENCOUNTER
Future Appointments    Encounter Information    Provider Department Appt Notes   5/13/2022 PAULA Mina AT [facility] medicare awv //sxc   5/13/2022 PAULA Mina AT [facility] Return in about 6 months (around 5/12/2022).        Past Visits    Date Provider Specialty Visit Type Primary Dx   02/08/2022 Jess Sun PA-C Family Medicine Office Visit Other osteoarthritis of spine, lumbar region

## 2022-05-13 ENCOUNTER — OFFICE VISIT (OUTPATIENT)
Dept: FAMILY MEDICINE CLINIC | Age: 87
End: 2022-05-13
Payer: MEDICARE

## 2022-05-13 VITALS
SYSTOLIC BLOOD PRESSURE: 120 MMHG | DIASTOLIC BLOOD PRESSURE: 78 MMHG | OXYGEN SATURATION: 96 % | HEIGHT: 62 IN | BODY MASS INDEX: 25.4 KG/M2 | WEIGHT: 138 LBS | HEART RATE: 77 BPM | TEMPERATURE: 97.4 F

## 2022-05-13 VITALS
OXYGEN SATURATION: 96 % | HEIGHT: 62 IN | HEART RATE: 77 BPM | DIASTOLIC BLOOD PRESSURE: 78 MMHG | TEMPERATURE: 97.4 F | SYSTOLIC BLOOD PRESSURE: 120 MMHG | WEIGHT: 138 LBS | BODY MASS INDEX: 25.4 KG/M2

## 2022-05-13 DIAGNOSIS — M48.061 SPINAL STENOSIS OF LUMBAR REGION WITH RADICULOPATHY: ICD-10-CM

## 2022-05-13 DIAGNOSIS — M54.16 SPINAL STENOSIS OF LUMBAR REGION WITH RADICULOPATHY: ICD-10-CM

## 2022-05-13 DIAGNOSIS — R26.9 GAIT DISTURBANCE: ICD-10-CM

## 2022-05-13 DIAGNOSIS — I10 ESSENTIAL HYPERTENSION: Primary | ICD-10-CM

## 2022-05-13 DIAGNOSIS — Z00.00 MEDICARE ANNUAL WELLNESS VISIT, SUBSEQUENT: Primary | ICD-10-CM

## 2022-05-13 DIAGNOSIS — I10 ESSENTIAL HYPERTENSION: ICD-10-CM

## 2022-05-13 LAB
ALBUMIN SERPL-MCNC: 4.8 G/DL (ref 3.5–4.6)
ALP BLD-CCNC: 97 U/L (ref 40–130)
ALT SERPL-CCNC: 13 U/L (ref 0–33)
ANION GAP SERPL CALCULATED.3IONS-SCNC: 12 MEQ/L (ref 9–15)
AST SERPL-CCNC: 24 U/L (ref 0–35)
BASOPHILS ABSOLUTE: 0 K/UL (ref 0–0.2)
BASOPHILS RELATIVE PERCENT: 0.8 %
BILIRUB SERPL-MCNC: 0.8 MG/DL (ref 0.2–0.7)
BUN BLDV-MCNC: 14 MG/DL (ref 8–23)
CALCIUM SERPL-MCNC: 9.6 MG/DL (ref 8.5–9.9)
CHLORIDE BLD-SCNC: 101 MEQ/L (ref 95–107)
CO2: 26 MEQ/L (ref 20–31)
CREAT SERPL-MCNC: 0.73 MG/DL (ref 0.5–0.9)
EOSINOPHILS ABSOLUTE: 0.1 K/UL (ref 0–0.7)
EOSINOPHILS RELATIVE PERCENT: 1.6 %
GFR AFRICAN AMERICAN: >60
GFR NON-AFRICAN AMERICAN: >60
GLOBULIN: 2.5 G/DL (ref 2.3–3.5)
GLUCOSE BLD-MCNC: 94 MG/DL (ref 70–99)
HCT VFR BLD CALC: 48.8 % (ref 37–47)
HEMOGLOBIN: 15.8 G/DL (ref 12–16)
LYMPHOCYTES ABSOLUTE: 1.6 K/UL (ref 1–4.8)
LYMPHOCYTES RELATIVE PERCENT: 27.2 %
MCH RBC QN AUTO: 32.1 PG (ref 27–31.3)
MCHC RBC AUTO-ENTMCNC: 32.4 % (ref 33–37)
MCV RBC AUTO: 99 FL (ref 82–100)
MONOCYTES ABSOLUTE: 0.6 K/UL (ref 0.2–0.8)
MONOCYTES RELATIVE PERCENT: 9.9 %
NEUTROPHILS ABSOLUTE: 3.5 K/UL (ref 1.4–6.5)
NEUTROPHILS RELATIVE PERCENT: 60.5 %
PDW BLD-RTO: 14.6 % (ref 11.5–14.5)
PLATELET # BLD: 301 K/UL (ref 130–400)
POTASSIUM SERPL-SCNC: 5 MEQ/L (ref 3.4–4.9)
RBC # BLD: 4.93 M/UL (ref 4.2–5.4)
SODIUM BLD-SCNC: 139 MEQ/L (ref 135–144)
TOTAL PROTEIN: 7.3 G/DL (ref 6.3–8)
WBC # BLD: 5.9 K/UL (ref 4.8–10.8)

## 2022-05-13 PROCEDURE — G0439 PPPS, SUBSEQ VISIT: HCPCS | Performed by: PHYSICIAN ASSISTANT

## 2022-05-13 PROCEDURE — 99213 OFFICE O/P EST LOW 20 MIN: CPT | Performed by: PHYSICIAN ASSISTANT

## 2022-05-13 ASSESSMENT — PATIENT HEALTH QUESTIONNAIRE - PHQ9
10. IF YOU CHECKED OFF ANY PROBLEMS, HOW DIFFICULT HAVE THESE PROBLEMS MADE IT FOR YOU TO DO YOUR WORK, TAKE CARE OF THINGS AT HOME, OR GET ALONG WITH OTHER PEOPLE: 0
3. TROUBLE FALLING OR STAYING ASLEEP: 0
SUM OF ALL RESPONSES TO PHQ QUESTIONS 1-9: 0
9. THOUGHTS THAT YOU WOULD BE BETTER OFF DEAD, OR OF HURTING YOURSELF: 0
7. TROUBLE CONCENTRATING ON THINGS, SUCH AS READING THE NEWSPAPER OR WATCHING TELEVISION: 0
SUM OF ALL RESPONSES TO PHQ QUESTIONS 1-9: 0
6. FEELING BAD ABOUT YOURSELF - OR THAT YOU ARE A FAILURE OR HAVE LET YOURSELF OR YOUR FAMILY DOWN: 0
5. POOR APPETITE OR OVEREATING: 0
2. FEELING DOWN, DEPRESSED OR HOPELESS: 0
SUM OF ALL RESPONSES TO PHQ QUESTIONS 1-9: 0
SUM OF ALL RESPONSES TO PHQ9 QUESTIONS 1 & 2: 0
1. LITTLE INTEREST OR PLEASURE IN DOING THINGS: 0
4. FEELING TIRED OR HAVING LITTLE ENERGY: 0
8. MOVING OR SPEAKING SO SLOWLY THAT OTHER PEOPLE COULD HAVE NOTICED. OR THE OPPOSITE, BEING SO FIGETY OR RESTLESS THAT YOU HAVE BEEN MOVING AROUND A LOT MORE THAN USUAL: 0
SUM OF ALL RESPONSES TO PHQ QUESTIONS 1-9: 0

## 2022-05-13 ASSESSMENT — ENCOUNTER SYMPTOMS
BACK PAIN: 1
BACK PAIN: 1

## 2022-05-13 ASSESSMENT — LIFESTYLE VARIABLES: HOW OFTEN DO YOU HAVE A DRINK CONTAINING ALCOHOL: NEVER

## 2022-05-13 NOTE — PATIENT INSTRUCTIONS
Personalized Preventive Plan for Dariel Arzate - 5/13/2022  Medicare offers a range of preventive health benefits. Some of the tests and screenings are paid in full while other may be subject to a deductible, co-insurance, and/or copay. Some of these benefits include a comprehensive review of your medical history including lifestyle, illnesses that may run in your family, and various assessments and screenings as appropriate. After reviewing your medical record and screening and assessments performed today your provider may have ordered immunizations, labs, imaging, and/or referrals for you. A list of these orders (if applicable) as well as your Preventive Care list are included within your After Visit Summary for your review. Other Preventive Recommendations:    · A preventive eye exam performed by an eye specialist is recommended every 1-2 years to screen for glaucoma; cataracts, macular degeneration, and other eye disorders. · A preventive dental visit is recommended every 6 months. · Try to get at least 150 minutes of exercise per week or 10,000 steps per day on a pedometer . · Order or download the FREE \"Exercise & Physical Activity: Your Everyday Guide\" from [employer]. Call 4-536.254.8459 or search [employer] online. · You need 3435-1709 mg of calcium and 0768-8809 IU of vitamin D per day. It is possible to meet your calcium requirement with diet alone, but a vitamin D supplement is usually necessary to meet this goal.  · When exposed to the sun, use a sunscreen that protects against both UVA and UVB radiation with an SPF of 30 or greater. Reapply every 2 to 3 hours or after sweating, drying off with a towel, or swimming. · Always wear a seat belt when traveling in a car. Always wear a helmet when riding a bicycle or motorcycle.

## 2022-05-13 NOTE — PROGRESS NOTES
Subjective  Carlin Engel, 82 y.o. female presents today with:  Chief Complaint   Patient presents with    Medicare AWV     Patient presents today for Medicare annual visit. No results found for: LABA1C  Lab Results   Component Value Date    CREATININE 0.64 11/09/2021     Lab Results   Component Value Date    ALT 14 11/09/2021    AST 27 11/09/2021     Lab Results   Component Value Date    CHOL 155 01/01/2019    TRIG 80 01/01/2019    HDL 84 (H) 11/09/2021    LDLCALC 78 11/09/2021          HPI  Patient is here for annual welcome to Medicare visit    Review of Systems   Musculoskeletal: Positive for back pain and gait problem. All other systems reviewed and are negative. Past Medical History:   Diagnosis Date    Anxiety     Arthritis     Chronic back pain     DEGENERATIVE BACK    Depression     Distal radial fracture 2012    GERD (gastroesophageal reflux disease)     Glaucoma     History of mammography, screening 2011 CAT 2 BILAT    History of osteopenia     Hypertension     meds > 8 yrs / denies TIA or stroke    Lumbar radicular pain     Polycythemia vera(238.4)     Rib fracture 11/30/2018    Senile osteoporosis      Past Surgical History:   Procedure Laterality Date    APPENDECTOMY      BACK SURGERY      COLONOSCOPY      ENDOSCOPY, COLON, DIAGNOSTIC      EYE SURGERY      OS eye glaucoma OR    FRACTURE SURGERY Left 05/06/2012    distal radial fracture/Dr. Gladys Devries    HYSTERECTOMY      [place] PERQ VERTEBROPLASTY UNI/BI INJX CERVICOTHORACIC N/A 3/7/2018    T-11, T-12 VERTEBRAL AUGMENTATION performed by Fiorella Lares MD at [address] [place] [address] Marital status:       Spouse name: Not on file    Number of children: Not on file    Years of education: Not on file    Highest education level: Not on file   Occupational History    Not on file   Tobacco Use    Smoking status: Never Smoker    Smokeless tobacco: Never Used   Substance and Sexual Activity    Alcohol use: No     Comment: denies    Drug use: No    Sexual activity: Never   Other Topics Concern    Not on file   Social History Narrative    Not on file     Social Determinants of Health     Financial Resource Strain: Low Risk     Difficulty of Paying Living Expenses: Not hard at all   Food Insecurity: No Food Insecurity    Worried About Running Out of Food in the Last Year: Never true    Yola of Food in the Last Year: Never true   Transportation Needs: No Transportation Needs    Lack of Transportation (Medical): No    Lack of Transportation (Non-Medical):  No   Physical Activity:     Days of Exercise per Week: Not on file    Minutes of Exercise per Session: Not on file   Stress:     Feeling of Stress : Not on file   Social Connections:     Frequency of Communication with Friends and Family: Not on file    Frequency of Social Gatherings with Friends and Family: Not on file    Attends Am[employer] Services: Not on file    Active Member of [address] [place] [address] or Organizations: Not on file    Attends Club or Organization Meetings: Not on file    Marital Status: Not on file   Intimate Partner Violence:     Fear of Current or Ex-Partner: Not on file    Emotionally Abused: Not on file    Physically Abused: Not on file    Sexually Abused: Not on file   Housing Stability:     Unable to Pay for Housing in the Last Year: Not on file    Number of [place] in the Last Year: Not on file    Unstable Housing in the Last Year: Not on file     Family History   Problem Relation Age of Onset    Breast Cancer Daughter     Cancer Daughter         kidney cancer    Diabetes Mother     Diabetes Father     High Cholesterol Sister     Other Sister          of old age   Unk [place] Other Brother         accident / car fall on him    Cancer Son         prostate cancer     Allergies   Allergen Reactions    Hydromorphone Other (See Comments)    Dilaudid [Fd&C Blue #1 Al Cornejo-Hydromorphone] Other (See Comments) crazziness        Current Outpatient Medications   Medication Sig Dispense Refill    lisinopril (PRINIVIL;ZESTRIL) 10 MG tablet TAKE ONE TABLET BY MOUTH EVERY DAY 90 tablet 3    metoprolol succinate (TOPROL XL) 25 MG extended release tablet TAKE ONE TABLET BY MOUTH EVERY DAY 90 tablet 3    lansoprazole (PREVACID) 30 MG delayed release capsule Take on tablet daily 90 capsule 3    ibandronate (BONIVA) 150 MG tablet TAKE 1 TABLET BY MOUTH EVERY 30 DAYS IN THE MORNING WITH A FULL GLASS OF WATER ON EMPTY STOMACH. DO NOT TAKE ANYTHING ELSE OR LIE DOWN FOR 30 MINUTES 4 tablet 11    diclofenac sodium (VOLTAREN) 1 % GEL Apply 4 g topically 4 times daily 4 Tube 3    Handicap Placard MISC by Does not apply route Start date:  12/18/2019 Expiration date 12/18/2024 1 each 0    ibuprofen (ADVIL;MOTRIN) 800 MG tablet TAKE ONE TABLET BY MOUTH EVERY DAY WITH FOOD AS NEEDED FOR PAIN 30 tablet 2    Nutritional Supplements (ENSURE COMPLETE SHAKE) LIQD Take 1 Can by mouth 3 times daily Strawberry or chocolate flavor 90 Bottle 5    bimatoprost (LUMIGAN) 0.01 % SOLN ophthalmic drops Place 1 drop into both eyes every evening       No current facility-administered medications for this visit. Objective    Vitals:    05/13/22 1021   BP: 120/78   Site: Right Upper Arm   Position: Sitting   Cuff Size: Medium Adult   Pulse: 77   Temp: 97.4 °F (36.3 °C)   TempSrc: Temporal   SpO2: 96%   Weight: 138 lb (62.6 kg)   Height: 5' 2\" (1.575 m)     Physical Exam    Medicare Annual Wellness Visit    Carlin Rodriguez is here for Medicare AWV (Patient presents today for Medicare annual visit. )    Assessment & Plan    Recommendations for Preventive Services Due: see orders and patient instructions/AVS.  Recommended screening schedule for the next 5-10 years is provided to the patient in written form: see Patient Instructions/AVS.     No follow-ups on file.      Subjective   {OPTIONAL FOR THIS VISIT TYPE - TO BE USED IF ADDRESSING AN ACUTE OR CHRONIC PROBLEM (THIS WILL AUTO-DELETE IF NOT USED):[id number]  Patient's complete Health Risk Assessment and screening values have been reviewed and are found in Flowsheets. The following problems were reviewed today and where indicated follow up appointments were made and/or referrals ordered. Positive Risk Factor Screenings with Interventions:    Fall Risk:  Do you feel unsteady or are you worried about falling? : (!) yes  2 or more falls in past year?: no  Fall with injury in past year?: no     Fall Risk Interventions:    · Home safety tips provided    Cognitive: Words recalled: 0 Words Recalled  Clock Drawing Test (CDT): Normal  Total Score Interpretation: Abnormal Mini-Cog  Did the patient refuse to take the cognition test?: No    Cognitive Impairment Interventions:  · Patient declines any further evaluation/treatment for cognitive impairment           General Health and ACP:       Advance Directives     Power of  Living Will ACP-Advance Directive ACP-Power of Yolaal Cande on 11/05/20 Not on File Not on File [address] Risk Interventions:  · has a living will              Objective   Vitals:    05/13/22 1021   BP: 120/78   Site: Right Upper Arm   Position: Sitting   Cuff Size: Medium Adult   Pulse: 77   Temp: 97.4 °F (36.3 °C)   TempSrc: Temporal   SpO2: 96%   Weight: 138 lb (62.6 kg)   Height: 5' 2\" (1.575 m)      Body mass index is 25.24 kg/m². Allergies   Allergen Reactions    Hydromorphone Other (See Comments)    Dilaudid [Fd&C Blue #1 Al Cornejo-Hydromorphone] Other (See Comments)     crazziness     Prior to Visit Medications    Medication Sig Taking?  Authorizing Provider   lisinopril (PRINIVIL;ZESTRIL) 10 MG tablet TAKE ONE TABLET BY MOUTH EVERY DAY Yes Julissa Baeza PA-C   metoprolol succinate (TOPROL XL) 25 MG extended release tablet TAKE ONE TABLET BY MOUTH EVERY DAY Yes Julissa Baeza PA-C   lansoprazole (PREVACID) 30 MG delayed release capsule Take on tablet daily Yes Julissa Baeza PA-C   ibandronate (BONIVA) 150 MG tablet TAKE 1 TABLET BY MOUTH EVERY 30 DAYS IN THE MORNING WITH A FULL GLASS OF WATER ON EMPTY STOMACH.  DO NOT TAKE ANYTHING ELSE OR LIE DOWN FOR 30 MINUTES Yes Julissa Baeza PA-C   diclofenac sodium (VOLTAREN) 1 % GEL Apply 4 g topically 4 times daily Yes Julissa Baeza PA-C   Handicap Placard MISC by Does not apply route Start date:  12/18/2019 Expiration date 12/18/2024 Yes Julissa Baeza PA-C   ibuprofen (ADVIL;MOTRIN) 800 MG tablet TAKE ONE TABLET BY MOUTH EVERY DAY WITH FOOD AS NEEDED FOR PAIN Yes Julissa Baeza PA-C   Nutritional Supplements (ENSURE COMPLETE SHAKE) LIQD Take 1 Can by mouth 3 times daily Strawberry or chocolate flavor Yes Julissa Baeza PA-C   bimatoprost (LUMIGAN) 0.01 % SOLN ophthalmic drops Place 1 drop into both eyes every evening Yes Historical Provider, MD Francis (Including outside providers/suppliers regularly involved in providing care):   Patient Care Team:  Sincere Christian PA-C as PCP - General (Physician Assistant)  Sincere Christian PA-C as PCP - [address] [place] [address] Dr Haas Provider    Reviewed and updated this visit:  Tobacco  Allergies  Meds  Med Hx  Surg Hx  Soc Hx  Fam Hx

## 2022-05-13 NOTE — PROGRESS NOTES
Subjective  Carlin Engel, 82 y.o. female presents today with:  Chief Complaint   Patient presents with    Follow-up     Patient presents today for 5 month f/u           No results found for: LABA1C  Lab Results   Component Value Date    CREATININE 0.64 11/09/2021     Lab Results   Component Value Date    ALT 14 11/09/2021    AST 27 11/09/2021     Lab Results   Component Value Date    CHOL 155 01/01/2019    TRIG 80 01/01/2019    HDL 84 (H) 11/09/2021    LDLCALC 78 11/09/2021          HPI  Patient is here for follow-up along with her daughter complains of chronic low back pain with radiation to bilateral lower extremities left greater than right states she  feels unsteady on her feet but denies falls uses a cane or walker in her home when she feels she needs it otherwise denies chest pain pressure shortness of breath she lives independently and is able to maintain her own home she stays very active    Review of Systems   Musculoskeletal: Positive for back pain and gait problem. Neurological: Positive for weakness and numbness. All other systems reviewed and are negative.         Past Medical History:   Diagnosis Date    Anxiety     Arthritis     Chronic back pain     DEGENERATIVE BACK    Depression     Distal radial fracture 2012    GERD (gastroesophageal reflux disease)     Glaucoma     History of mammography, screening 2011 CAT 2 BILAT    History of osteopenia     Hypertension     meds > 8 yrs / denies TIA or stroke    Lumbar radicular pain     Polycythemia vera(238.4)     Rib fracture 11/30/2018    Senile osteoporosis      Past Surgical History:   Procedure Laterality Date    APPENDECTOMY      BACK SURGERY      COLONOSCOPY      ENDOSCOPY, COLON, DIAGNOSTIC      EYE SURGERY      OS eye glaucoma OR    FRACTURE SURGERY Left 05/06/2012    distal radial fracture/Dr. Liriano Medal    HYSTERECTOMY      [place] PERQ VERTEBROPLASTY UNI/BI INJX CERVICOTHORACIC N/A 3/7/2018    T-11, T-12 VERTEBRAL AUGMENTATION performed by Azalia Maxwell MD at [address] [place] [address] Marital status:      Spouse name: Not on file    Number of children: Not on file    Years of education: Not on file    Highest education level: Not on file   Occupational History    Not on file   Tobacco Use    Smoking status: Never Smoker    Smokeless tobacco: Never Used   Substance and Sexual Activity    Alcohol use: No     Comment: denies    Drug use: No    Sexual activity: Never   Other Topics Concern    Not on file   Social History Narrative    Not on file     Social Determinants of Health     Financial Resource Strain: Low Risk     Difficulty of Paying Living Expenses: Not hard at all   Food Insecurity: No Food Insecurity    Worried About Running Out of Food in the Last Year: Never true    Yola of Food in the Last Year: Never true   Transportation Needs: No Transportation Needs    Lack of Transportation (Medical): No    Lack of Transportation (Non-Medical):  No   Physical Activity:     Days of Exercise per Week: Not on file    Minutes of Exercise per Session: Not on file   Stress:     Feeling of Stress : Not on file   Social Connections:     Frequency of Communication with Friends and Family: Not on file    Frequency of Social Gatherings with Friends and Family: Not on file    Attends Taoist Services: Not on file    Active Member of [address] [place] [address] or Organizations: Not on file    Attends Club or Organization Meetings: Not on file    Marital Status: Not on file   Intimate Partner Violence:     Fear of Current or Ex-Partner: Not on file    Emotionally Abused: Not on file    Physically Abused: Not on file    Sexually Abused: Not on file   Housing Stability:     Unable to Pay for Housing in the Last Year: Not on file    Number of [place] in the Last Year: Not on file    Unstable Housing in the Last Year: Not on file     Family History   Problem Relation Age of Onset    Breast Cancer Daughter     Cancer Daughter         kidney cancer    Diabetes Mother     Diabetes Father     High Cholesterol Sister     Other Sister          of old age   Iraheta Other Brother         accident / car fall on him    Cancer Son         prostate cancer     Allergies   Allergen Reactions    Hydromorphone Other (See Comments)    Dilaudid [Fd&C Blue #1 Al Cornejo-Hydromorphone] Other (See Comments)     crazziness        Current Outpatient Medications   Medication Sig Dispense Refill    lisinopril (PRINIVIL;ZESTRIL) 10 MG tablet TAKE ONE TABLET BY MOUTH EVERY DAY 90 tablet 3    metoprolol succinate (TOPROL XL) 25 MG extended release tablet TAKE ONE TABLET BY MOUTH EVERY DAY 90 tablet 3    lansoprazole (PREVACID) 30 MG delayed release capsule Take on tablet daily 90 capsule 3    ibandronate (BONIVA) 150 MG tablet TAKE 1 TABLET BY MOUTH EVERY 30 DAYS IN THE MORNING WITH A FULL GLASS OF WATER ON EMPTY STOMACH. DO NOT TAKE ANYTHING ELSE OR LIE DOWN FOR 30 MINUTES 4 tablet 11    diclofenac sodium (VOLTAREN) 1 % GEL Apply 4 g topically 4 times daily 4 Tube 3    Handicap Placard MISC by Does not apply route Start date:  2019 Expiration date 2024 1 each 0    ibuprofen (ADVIL;MOTRIN) 800 MG tablet TAKE ONE TABLET BY MOUTH EVERY DAY WITH FOOD AS NEEDED FOR PAIN 30 tablet 2    Nutritional Supplements (ENSURE COMPLETE SHAKE) LIQD Take 1 Can by mouth 3 times daily Strawberry or chocolate flavor 90 Bottle 5    bimatoprost (LUMIGAN) 0.01 % SOLN ophthalmic drops Place 1 drop into both eyes every evening       No current facility-administered medications for this visit. Objective    Vitals:    22 1040   BP: 120/78   Site: Right Upper Arm   Position: Sitting   Cuff Size: Medium Adult   Pulse: 77   Temp: 97.4 °F (36.3 °C)   TempSrc: Temporal   SpO2: 96%   Weight: 138 lb (62.6 kg)   Height: 5' 2\" (1.575 m)     Physical Exam  Constitutional:       General: She is not in acute distress. Appearance: She is obese. She is not ill-appearing. HENT:      Head: Normocephalic and atraumatic. Right Ear: External ear normal.      Left Ear: External ear normal.      Nose: Nose normal.      Mouth/Throat:      Mouth: Mucous membranes are moist.      Pharynx: Oropharynx is clear. Eyes:      Conjunctiva/sclera: Conjunctivae normal.      Pupils: Pupils are equal, round, and reactive to light. Neck:      Thyroid: No thyromegaly. Cardiovascular:      Rate and Rhythm: Normal rate and regular rhythm. Pulses: Normal pulses. Heart sounds: Normal heart sounds. No murmur heard. Pulmonary:      Effort: Pulmonary effort is normal. No respiratory distress. Breath sounds: Normal breath sounds. No wheezing, rhonchi or rales. Abdominal:      General: Bowel sounds are normal.      Palpations: Abdomen is soft. There is no mass. Tenderness: There is no abdominal tenderness. There is no guarding. Musculoskeletal:         General: Normal range of motion. Cervical back: Normal range of motion and neck supple. Right lower leg: No edema. Left lower leg: No edema. Lymphadenopathy:      Cervical: No cervical adenopathy. Skin:     General: Skin is warm and dry. Coloration: Skin is not jaundiced or pale. Neurological:      General: No focal deficit present. Mental Status: She is alert and oriented to person, place, and time. Cranial Nerves: No cranial nerve deficit. Motor: No weakness. Coordination: Coordination normal.      Gait: Gait abnormal.      Comments: Slow steady gait with assistance   Psychiatric:         Mood and Affect: Mood normal.         Behavior: Behavior normal.         Thought Content: Thought content normal.         Judgment: Judgment normal.                 Assessment & Plan    Diagnosis Orders   1. Essential hypertension     2. Gait disturbance      Declines any further intervention at this time   3.  Spinal stenosis of lumbar region with radiculopathy           No orders of the defined types were placed in this encounter. No orders of the defined types were placed in this encounter. There are no discontinued medications. Return in about 6 months (around 11/13/2022).     Julissa Baeza PA-C

## 2022-06-02 ENCOUNTER — TELEPHONE (OUTPATIENT)
Dept: FAMILY MEDICINE CLINIC | Age: 87
End: 2022-06-02

## 2022-10-18 ENCOUNTER — TELEPHONE (OUTPATIENT)
Dept: FAMILY MEDICINE CLINIC | Age: 87
End: 2022-10-18

## 2022-10-18 NOTE — TELEPHONE ENCOUNTER
----- Message from Maria Elena Olsen sent at 10/18/2022  9:58 AM EDT -----  Subject: Message to Provider    QUESTIONS  Information for Provider? pt's daughters states that she has been hiding   her ID cards and would like to know if the office is able to take a   picture of her ID since she never has it. She has Dementia and that is why   she keeps hiding things. ---------------------------------------------------------------------------  --------------  Jose Dobson ZVVB  [phone]; OK to leave message on voicemail  ---------------------------------------------------------------------------  --------------  SCRIPT ANSWERS  Relationship to Patient? Other  Representative Name? Bell Teresa  Is the Representative on the appropriate HIPAA document in Epic?  Yes

## 2022-11-10 ENCOUNTER — OFFICE VISIT (OUTPATIENT)
Dept: FAMILY MEDICINE CLINIC | Age: 87
End: 2022-11-10
Payer: MEDICARE

## 2022-11-10 VITALS
HEIGHT: 62 IN | BODY MASS INDEX: 25.4 KG/M2 | WEIGHT: 138 LBS | OXYGEN SATURATION: 96 % | SYSTOLIC BLOOD PRESSURE: 115 MMHG | DIASTOLIC BLOOD PRESSURE: 82 MMHG | HEART RATE: 80 BPM | TEMPERATURE: 97.2 F

## 2022-11-10 DIAGNOSIS — Z13.220 LIPID SCREENING: ICD-10-CM

## 2022-11-10 DIAGNOSIS — M25.841 CYST OF JOINT OF HAND, RIGHT: ICD-10-CM

## 2022-11-10 DIAGNOSIS — N30.00 ACUTE CYSTITIS WITHOUT HEMATURIA: ICD-10-CM

## 2022-11-10 DIAGNOSIS — Z23 NEED FOR INFLUENZA VACCINATION: ICD-10-CM

## 2022-11-10 DIAGNOSIS — I10 ESSENTIAL HYPERTENSION: Primary | ICD-10-CM

## 2022-11-10 PROCEDURE — 3074F SYST BP LT 130 MM HG: CPT | Performed by: PHYSICIAN ASSISTANT

## 2022-11-10 PROCEDURE — 1123F ACP DISCUSS/DSCN MKR DOCD: CPT | Performed by: PHYSICIAN ASSISTANT

## 2022-11-10 PROCEDURE — 99213 OFFICE O/P EST LOW 20 MIN: CPT | Performed by: PHYSICIAN ASSISTANT

## 2022-11-10 PROCEDURE — 90694 VACC AIIV4 NO PRSRV 0.5ML IM: CPT | Performed by: PHYSICIAN ASSISTANT

## 2022-11-10 PROCEDURE — G0008 ADMIN INFLUENZA VIRUS VAC: HCPCS | Performed by: PHYSICIAN ASSISTANT

## 2022-11-10 PROCEDURE — 3078F DIAST BP <80 MM HG: CPT | Performed by: PHYSICIAN ASSISTANT

## 2022-11-10 NOTE — PROGRESS NOTES
Subjective  Carlin Engel, 82 y.o. female presents today with:  No chief complaint on file. HPI      Review of Systems      Past Medical History:   Diagnosis Date    Anxiety     Arthritis     Chronic back pain     DEGENERATIVE BACK    Depression     Distal radial fracture 2012    GERD (gastroesophageal reflux disease)     Glaucoma     History of mammography, screening 2011 CAT 2 BILAT    History of osteopenia     Hypertension     meds > 8 yrs / denies TIA or stroke    Lumbar radicular pain     Polycythemia vera(238.4)     Rib fracture 11/30/2018    Senile osteoporosis      Past Surgical History:   Procedure Laterality Date    APPENDECTOMY      BACK SURGERY      COLONOSCOPY      ENDOSCOPY, COLON, DIAGNOSTIC      EYE SURGERY      OS eye glaucoma OR    FRACTURE SURGERY Left 05/06/2012    distal radial fracture/Dr. Morenita Razo    HYSTERECTOMY      [place] PERQ VERTEBROPLASTY UNI/BI INJX CERVICOTHORACIC N/A 3/7/2018    T-11, T-12 VERTEBRAL [facility]     Socioeconomic History    Marital status:      Spouse name: Not on file    Number of children: Not on file    Years of education: Not on file    Highest education level: Not on file   Occupational History    Not on file   Tobacco Use    Smoking status: Never    Smokeless tobacco: Never   Substance and Sexual Activity    Alcohol use: No     Comment: denies    Drug use: No    Sexual activity: Never   Other Topics Concern    Not on file   Social History Narrative    Not on file     Social Determinants of Health     Financial Resource Strain: Low Risk     Difficulty of Paying Living Expenses: Not hard at all   Food Insecurity: No Food Insecurity    Worried About Running Out of Food in the Last Year: Never true    [address] in the Last Year: Never true   Transportation Needs: No Transportation Needs    Lack of Transportation (Medical): No    Lack of Transportation (Non-Medical):  No   Physical Activity: Not on file   Stress: Not on file   Social Connections: Not on file   Intimate Partner Violence: Not on file   Housing Stability: Not on file     Family History   Problem Relation Age of Onset    Breast Cancer Daughter     Cancer Daughter         kidney cancer    Diabetes Mother     Diabetes Father     High Cholesterol Sister     Other Sister          of old age    Other Brother         accident / car fall on him    Cancer Son         prostate cancer        Allergies   Allergen Reactions    Hydromorphone Other (See Comments)    Dilaudid [Fd&C Blue #1 Al Cornejo-Hydromorphone] Other (See Comments)     crazziness     Current Outpatient Medications   Medication Sig Dispense Refill    lisinopril (PRINIVIL;ZESTRIL) 10 MG tablet TAKE ONE TABLET BY MOUTH EVERY DAY 90 tablet 3    metoprolol succinate (TOPROL XL) 25 MG extended release tablet TAKE ONE TABLET BY MOUTH EVERY DAY 90 tablet 3    lansoprazole (PREVACID) 30 MG delayed release capsule Take on tablet daily 90 capsule 3    ibandronate (BONIVA) 150 MG tablet TAKE 1 TABLET BY MOUTH EVERY 30 DAYS IN THE MORNING WITH A FULL GLASS OF WATER ON EMPTY STOMACH. DO NOT TAKE ANYTHING ELSE OR LIE DOWN FOR 30 MINUTES 4 tablet 11    diclofenac sodium (VOLTAREN) 1 % GEL Apply 4 g topically 4 times daily 4 Tube 3    Handicap Placard MISC by Does not apply route Start date:  2019 Expiration date 2024 1 each 0    ibuprofen (ADVIL;MOTRIN) 800 MG tablet TAKE ONE TABLET BY MOUTH EVERY DAY WITH FOOD AS NEEDED FOR PAIN 30 tablet 2    Nutritional Supplements (ENSURE COMPLETE SHAKE) LIQD Take 1 Can by mouth 3 times daily Strawberry or chocolate flavor 90 Bottle 5    bimatoprost (LUMIGAN) 0.01 % SOLN ophthalmic drops Place 1 drop into both eyes every evening       No current facility-administered medications for this visit.        Objective    Vitals:    11/10/22 1121   Height: 5' 2\" (1.575 m)     Physical Exam

## 2022-11-10 NOTE — PROGRESS NOTES
Vaccine Information Sheet, \"Influenza - Inactivated\"  given to Pavithra Burton, or parent/legal guardian of  Carlin Smith Ip and verbalized understanding. Patient responses:    Have you ever had a reaction to a flu vaccine? No  Are you able to eat eggs without adverse effects? Yes  Do you have any current illness? No  Have you ever had [place] Syndrome? No    Flu vaccine given per order. Please see immunization tab. Subjective  Carlin Engel, 82 y.o. female presents today with:  Chief Complaint   Patient presents with    Hypertension     Pt is here for 6 month f/u. She was last here due to hypertension    Urinary Tract Infection     Pt went to urgent care three-four weeks ago due to having uti. She has finished her medication but still has the infection. Flu Vaccine     Pt wants flu vacc. HPI  Patient is here accompanied by her daughter for follow-up on hypertension she denies chest pain pressure shortness of breath she still has occasional reflux due to dietary habits uses Prevacid if needed   treated at an independent urgent care for UTI 2 weeks ago but does not feel it resolved still with dysuria  She has a cyst on the knuckle of the right middle finger denies pain questioning if it needs to removal      Review of Systems   Genitourinary:  Positive for dysuria. All other systems reviewed and are negative.       Past Medical History:   Diagnosis Date    Anxiety     Arthritis     Chronic back pain     DEGENERATIVE BACK    Depression     Distal radial fracture 2012    GERD (gastroesophageal reflux disease)     Glaucoma     History of mammography, screening 2011 CAT 2 BILAT    History of osteopenia     Hypertension     meds > 8 yrs / denies TIA or stroke    Lumbar radicular pain     Polycythemia vera(238.4)     Rib fracture 11/30/2018    Senile osteoporosis      Past Surgical History:   Procedure Laterality Date    APPENDECTOMY      BACK SURGERY      COLONOSCOPY      ENDOSCOPY, COLON, DIAGNOSTIC      EYE SURGERY      OS eye glaucoma OR    FRACTURE SURGERY Left 2012    distal radial fracture/Dr. Delio Khan    HYSTERECTOMY (CERVIX STATUS UNKNOWN)      [place] PERQ VERTEBROPLASTY UNI/BI INJX CERVICOTHORACIC N/A 3/7/2018    T-11, T-12 VERTEBRAL AUGMENTATION performed by Malou Harper MD at [address] [facility] History     Socioeconomic History    Marital status:      Spouse name: Not on file    Number of children: Not on file    Years of education: Not on file    Highest education level: Not on file   Occupational History    Not on file   Tobacco Use    Smoking status: Never    Smokeless tobacco: Never   Substance and Sexual Activity    Alcohol use: No     Comment: denies    Drug use: No    Sexual activity: Never   Other Topics Concern    Not on file   Social History Narrative    Not on file     Social Determinants of Health     Financial Resource Strain: Low Risk     Difficulty of Paying Living Expenses: Not hard at all   Food Insecurity: No Food Insecurity    Worried About Running Out of Food in the Last Year: Never true    [address] in the Last Year: Never true   Transportation Needs: No Transportation Needs    Lack of Transportation (Medical): No    Lack of Transportation (Non-Medical):  No   Physical Activity: Not on file   Stress: Not on file   Social Connections: Not on file   Intimate Partner Violence: Not on file   Housing Stability: Not on file     Family History   Problem Relation Age of Onset    Breast Cancer Daughter     Cancer Daughter         kidney cancer    Diabetes Mother     Diabetes Father     High Cholesterol Sister     Other Sister          of old age    Other Brother         accident / car fall on him    Cancer Son         prostate cancer        Allergies   Allergen Reactions    Hydromorphone Other (See Comments)    Dilaudid [Fd&C Blue #1 [place]-Hydromorphone] Other (See Comments)     crazziness     Current Outpatient Medications   Medication Sig Dispense Refill lisinopril (PRINIVIL;ZESTRIL) 10 MG tablet TAKE ONE TABLET BY MOUTH EVERY DAY 90 tablet 3    metoprolol succinate (TOPROL XL) 25 MG extended release tablet TAKE ONE TABLET BY MOUTH EVERY DAY 90 tablet 3    lansoprazole (PREVACID) 30 MG delayed release capsule Take on tablet daily 90 capsule 3    ibandronate (BONIVA) 150 MG tablet TAKE 1 TABLET BY MOUTH EVERY 30 DAYS IN THE MORNING WITH A FULL GLASS OF WATER ON EMPTY STOMACH. DO NOT TAKE ANYTHING ELSE OR LIE DOWN FOR 30 MINUTES 4 tablet 11    diclofenac sodium (VOLTAREN) 1 % GEL Apply 4 g topically 4 times daily 4 Tube 3    Handicap Placard MISC by Does not apply route Start date:  12/18/2019 Expiration date 12/18/2024 1 each 0    ibuprofen (ADVIL;MOTRIN) 800 MG tablet TAKE ONE TABLET BY MOUTH EVERY DAY WITH FOOD AS NEEDED FOR PAIN 30 tablet 2    Nutritional Supplements (ENSURE COMPLETE SHAKE) LIQD Take 1 Can by mouth 3 times daily Strawberry or chocolate flavor 90 Bottle 5    bimatoprost (LUMIGAN) 0.01 % SOLN ophthalmic drops Place 1 drop into both eyes every evening       No current facility-administered medications for this visit. Objective    Vitals:    11/10/22 1121   BP: 115/82   Site: Right Upper Arm   Position: Sitting   Cuff Size: Medium Adult   Pulse: 80   Temp: 97.2 °F (36.2 °C)   TempSrc: Temporal   SpO2: 96%   Weight: 138 lb (62.6 kg)   Height: 5' 2\" (1.575 m)     Physical Exam  Constitutional:       General: She is not in acute distress. Appearance: Normal appearance. She is not ill-appearing. HENT:      Head: Normocephalic and atraumatic. Eyes:      Extraocular Movements: Extraocular movements intact. Conjunctiva/sclera: Conjunctivae normal.      Pupils: Pupils are equal, round, and reactive to light. Neck:      Thyroid: No thyromegaly. Cardiovascular:      Rate and Rhythm: Normal rate and regular rhythm. Heart sounds: Normal heart sounds. No murmur heard.   Pulmonary:      Effort: Pulmonary effort is normal. No respiratory distress. Breath sounds: Normal breath sounds. No wheezing or rales. Abdominal:      General: Bowel sounds are normal.      Palpations: Abdomen is soft. There is no mass. Tenderness: There is abdominal tenderness in the suprapubic area. There is no right CVA tenderness, left CVA tenderness or guarding. Musculoskeletal:         General: Normal range of motion. Cervical back: Normal range of motion and neck supple. Right lower leg: No edema. Left lower leg: No edema. Comments: Mobile subcu cystic mass PIP right middle finger   Lymphadenopathy:      Cervical: No cervical adenopathy. Skin:     General: Skin is warm and dry. Coloration: Skin is not jaundiced or pale. Neurological:      General: No focal deficit present. Mental Status: She is alert and oriented to person, place, and time. Cranial Nerves: No cranial nerve deficit. Motor: No weakness. Coordination: Coordination normal.      Gait: Gait normal.   Psychiatric:         Mood and Affect: Mood normal.         Behavior: Behavior normal.         Thought Content: Thought content normal.         Judgment: Judgment normal.            Assessment & Plan    Diagnosis Orders   1. Essential hypertension  Comprehensive Metabolic Panel    CBC with Auto Differential      2. Acute cystitis without hematuria  Culture, Urine      3. Cyst of joint of hand, right      Reassurance provided no intervention needed      4. Need for influenza vaccination  Influenza, FLUAD, (age 72 y+), IM, Preservative Free, 0.5 mL      5. Lipid screening  Lipid, Fasting            Orders Placed This Encounter   Procedures    Culture, Urine     Standing Status:   Future     Number of Occurrences:   1     Standing Expiration Date:   11/10/2023     Order Specific Question:   Specify (ex-cath, midstream, cysto, etc)?      Answer:   mid stream    Influenza, FLUAD, (age 72 y+), IM, Preservative Free, 0.5 mL    Lipid, Fasting Standing Status:   Future     Standing Expiration Date:   11/10/2023    Comprehensive Metabolic Panel     Standing Status:   Future     Standing Expiration Date:   11/10/2023    CBC with Auto Differential     Standing Status:   Future     Standing Expiration Date:   11/10/2023     No orders of the defined types were placed in this encounter. There are no discontinued medications. Return in about 6 months (around 5/10/2023), or if symptoms worsen or fail to improve.     Julissa Baeza PA-C

## 2022-11-12 LAB
ORGANISM: ABNORMAL
URINE CULTURE, ROUTINE: ABNORMAL
URINE CULTURE, ROUTINE: ABNORMAL

## 2022-11-13 DIAGNOSIS — N30.00 ACUTE CYSTITIS WITHOUT HEMATURIA: Primary | ICD-10-CM

## 2022-11-13 RX ORDER — CIPROFLOXACIN 500 MG/1
500 TABLET, FILM COATED ORAL 2 TIMES DAILY
Qty: 20 TABLET | Refills: 0 | Status: SHIPPED | OUTPATIENT
Start: 2022-11-13 | End: 2022-11-23

## 2022-11-17 ENCOUNTER — HOSPITAL ENCOUNTER (EMERGENCY)
Age: 87
Discharge: HOME | End: 2022-11-17
Attending: STUDENT IN AN ORGANIZED HEALTH CARE EDUCATION/TRAINING PROGRAM
Payer: MEDICARE

## 2022-11-17 VITALS
HEIGHT: 63 IN | DIASTOLIC BLOOD PRESSURE: 96 MMHG | BODY MASS INDEX: 23.74 KG/M2 | TEMPERATURE: 97.7 F | HEART RATE: 93 BPM | OXYGEN SATURATION: 97 % | WEIGHT: 134 LBS | SYSTOLIC BLOOD PRESSURE: 172 MMHG | RESPIRATION RATE: 16 BRPM

## 2022-11-17 DIAGNOSIS — N81.10 FEMALE BLADDER PROLAPSE: Primary | ICD-10-CM

## 2022-11-17 PROCEDURE — 99282 EMERGENCY DEPT VISIT SF MDM: CPT

## 2022-11-17 ASSESSMENT — ENCOUNTER SYMPTOMS
CHEST TIGHTNESS: 0
SHORTNESS OF BREATH: 0
BACK PAIN: 0
ABDOMINAL PAIN: 0
TROUBLE SWALLOWING: 0
DIARRHEA: 0
COUGH: 0
VOMITING: 0
SINUS PRESSURE: 0

## 2022-11-17 ASSESSMENT — PAIN - FUNCTIONAL ASSESSMENT: PAIN_FUNCTIONAL_ASSESSMENT: NONE - DENIES PAIN

## 2022-11-17 NOTE — ED TRIAGE NOTES
Pt states she has a \"ball\" protruding from her vagina for the past three days, denies pain, currently being treated for a UTI, Pt is A&OX3, calm, ambulatory, afebrile, breathes are equal and unlabored,

## 2022-11-17 NOTE — DISCHARGE INSTRUCTIONS
Bladder. The ER physician wants you to follow-up with Dr. Trevor Camarena, an OB/GYN that does procedures to help with this problem.

## 2022-11-17 NOTE — ED PROVIDER NOTES
[address] [facility] ED  eMERGENCY dEPARTMENT eNCOUnter      Pt Name: Titus Terrazas  MRN: [id number]  [place] 1935  Date of evaluation: 11/17/2022  Provider: Xochilt Arguello DO    CHIEF COMPLAINT       Chief Complaint   Patient presents with    Other     Pt states she has something protruding from her vagina         HISTORY OF PRESENT ILLNESS   (Location/Symptom, Timing/Onset,Context/Setting, Quality, Duration, Modifying Factors, Severity)  Note limiting factors. Titus Terrazas is a 82 y.o. female who presents to the emergency department with something protruding from her vagina. Patient denies putting anything up into her vagina. Patient does not know if it is her intestines or something else. Patient denies any abdominal pain. Patient really recently treated for a UTI per her daughter. No fever, no chills, no cough, and no abdominal pain. The history is provided by the patient. NursingNotes were reviewed. REVIEW OF SYSTEMS    (2-9 systems for level 4, 10 or more for level 5)     Review of Systems   Constitutional:  Negative for activity change, appetite change, chills, fever and unexpected weight change. HENT:  Negative for drooling, ear pain, nosebleeds, sinus pressure and trouble swallowing. Respiratory:  Negative for cough, chest tightness and shortness of breath. Cardiovascular:  Negative for chest pain and leg swelling. Gastrointestinal:  Negative for abdominal pain, diarrhea and vomiting. Endocrine: Negative for polydipsia and polyphagia. Genitourinary:  Negative for dysuria, flank pain and frequency. Musculoskeletal:  Negative for back pain and myalgias. Skin:  Negative for pallor and rash. Neurological:  Negative for syncope, weakness and headaches. Hematological:  Does not bruise/bleed easily. All other systems reviewed and are negative. Except as noted above the remainder of the review of systems was reviewed and negative.        PAST MEDICAL HISTORY     Past Medical History:   Diagnosis Date    Anxiety     Arthritis     Chronic back pain     DEGENERATIVE BACK    Depression     Distal radial fracture 2012    GERD (gastroesophageal reflux disease)     Glaucoma     History of mammography, screening 2011 CAT 2 BILAT    History of osteopenia     Hypertension     meds > 8 yrs / denies TIA or stroke    Lumbar radicular pain     Polycythemia vera(238.4)     Rib fracture 11/30/2018    Senile osteoporosis          SURGICALHISTORY       Past Surgical History:   Procedure Laterality Date    APPENDECTOMY      BACK SURGERY      COLONOSCOPY      ENDOSCOPY, COLON, DIAGNOSTIC      EYE SURGERY      OS eye glaucoma OR    FRACTURE SURGERY Left 05/06/2012    distal radial fracture/Dr. Morenita Razo    HYSTERECTOMY (CERVIX STATUS UNKNOWN)      [place] PERQ VERTEBROPLASTY UNI/BI INJX CERVICOTHORACIC N/A 3/7/2018    T-11, T-12 VERTEBRAL AUGMENTATION performed by Ronni Prader, MD at [address] [place] [address]       Previous Medications    BIMATOPROST (LUMIGAN) 0.01 % SOLN OPHTHALMIC DROPS    Place 1 drop into both eyes every evening    CIPROFLOXACIN (CIPRO) 500 MG TABLET    Take 1 tablet by mouth 2 times daily for 10 days    DICLOFENAC SODIUM (VOLTAREN) 1 % GEL    Apply 4 g topically 4 times daily    HANDICAP PLACARD MISC    by Does not apply route Start date:  12/18/2019 Expiration date 12/18/2024    IBANDRONATE (BONIVA) 150 MG TABLET    TAKE 1 TABLET BY MOUTH EVERY 30 DAYS IN THE MORNING WITH A FULL GLASS OF WATER ON EMPTY STOMACH.  DO NOT TAKE ANYTHING ELSE OR LIE DOWN FOR 30 MINUTES    IBUPROFEN (ADVIL;MOTRIN) 800 MG TABLET    TAKE ONE TABLET BY MOUTH EVERY DAY WITH FOOD AS NEEDED FOR PAIN    LANSOPRAZOLE (PREVACID) 30 MG DELAYED RELEASE CAPSULE    Take on tablet daily    LISINOPRIL (PRINIVIL;ZESTRIL) 10 MG TABLET    TAKE ONE TABLET BY MOUTH EVERY DAY    METOPROLOL SUCCINATE (TOPROL XL) 25 MG EXTENDED RELEASE TABLET    TAKE ONE TABLET BY MOUTH EVERY DAY    NUTRITIONAL SUPPLEMENTS (ENSURE COMPLETE SHAKE) LIQD    Take 1 Can by mouth 3 times daily Strawberry or chocolate flavor       ALLERGIES     Hydromorphone and Dilaudid [fd&c blue #1 [place]-hydromorphone]    FAMILY HISTORY       Family History   Problem Relation Age of Onset    Breast Cancer Daughter     Cancer Daughter         kidney cancer    Diabetes Mother     Diabetes Father     High Cholesterol Sister     Other Sister          of old age    Other Brother         accident / car fall on him    Cancer Son         prostate cancer          SOCIAL HISTORY       Social History     Socioeconomic History    Marital status:    Tobacco Use    Smoking status: Never    Smokeless tobacco: Never   Substance and Sexual Activity    Alcohol use: No     Comment: denies    Drug use: No    Sexual activity: Never       SCREENINGS    [place] Coma Scale  Eye Opening: Spontaneous  Best Verbal Response: Oriented  Best Motor Response: Obeys commands  [place] Coma Scale Score: 15 @FLOW(10455278)@      PHYSICAL EXAM    (up to 7 for level 4, 8 or more for level 5)     ED Triage Vitals [22 1139]   BP Temp Temp Source Heart Rate Resp SpO2 Height Weight   (!) 172/96 97.7 °F (36.5 °C) Oral 93 16 97 % 5' 3\" (1.6 m) 134 lb (60.8 kg)       Physical Exam  Vitals and nursing note reviewed. Exam conducted with a chaperone present. Constitutional:       General: She is awake. She is not in acute distress. Appearance: Normal appearance. She is well-developed and normal weight. She is not ill-appearing, toxic-appearing or diaphoretic. Comments: No photophobia. No phonophobia. HENT:      Head: Normocephalic and atraumatic. No Kuhn's sign. Right Ear: External ear normal.      Left Ear: External ear normal.      Nose: Nose normal. No congestion or rhinorrhea. Mouth/Throat:      Mouth: Mucous membranes are moist.      Pharynx: Oropharynx is clear. No oropharyngeal exudate or posterior oropharyngeal erythema.    Eyes:      General: No at baseline. Cranial Nerves: No cranial nerve deficit. Sensory: No sensory deficit. Motor: No weakness. Coordination: Coordination normal.      Deep Tendon Reflexes: Reflexes are normal and symmetric. Psychiatric:         Mood and Affect: Mood normal.         Behavior: Behavior normal. Behavior is cooperative. Thought Content: Thought content normal.         Judgment: Judgment normal.       DIAGNOSTIC RESULTS     EKG: All EKG's are interpreted by the Emergency Department Physician who either signs or Co-signsthis chart in the absence of a cardiologist.        RADIOLOGY:   Jt Oh such as CT, Ultrasound and MRI are read by the radiologist. Plain radiographic images are visualized and preliminarily interpreted by the emergency physician with the below findings:        Interpretation per the Radiologist below, if available at the time ofthis note:    No orders to display         ED BEDSIDE ULTRASOUND:   Performed by ED Physician - none    LABS:  Labs Reviewed - No data to display    All other labs were within normal range or not returned as of this dictation. EMERGENCY DEPARTMENT COURSE and DIFFERENTIAL DIAGNOSIS/MDM:   Vitals:    Vitals:    11/17/22 1139   BP: (!) 172/96   Pulse: 93   Resp: 16   Temp: 97.7 °F (36.5 °C)   TempSrc: Oral   SpO2: 97%   Weight: 134 lb (60.8 kg)   Height: 5' 3\" (1.6 m)           MDM    Patient has a bladder prolapse. ER physician spoke with Dr. Yaneth Fagan, she recommends referring patient to Dr. Cecile Silveira who does procedures to fix this type of problem. The patient is nontoxic. The findings were discussed with the patient. The patient was invited to return  to the ER if worse symptoms. The patient verbalized understanding of the care and they have no further questions. CONSULTS:  None    PROCEDURES:  Unless otherwise noted below, none     Procedures    FINAL IMPRESSION      1.  Female bladder prolapse          DISPOSITION/PLAN DISPOSITION Decision To Discharge 11/17/2022 12:40:05 PM      PATIENT REFERRED TO:  Ely Broussard PA-C  [address] [place] [address]  [phone]    Call   As needed    [facility]) ED  [address]  [phone]  Go to   As needed    [place]. [facility] [address]  [phone]    Call today  To schedule a follow up visit for bladder prolapse.     DISCHARGE MEDICATIONS:  New Prescriptions    No medications on file          (Please note that portions of this note were completed with a voice recognition program.  Efforts were made to edit the dictations but occasionally words are mis-transcribed.)    Angelo Bowman DO (electronically signed)  Attending Emergency Physician          Angelo Bowman DO  11/17/22 53 Mary Duran DO  11/17/22 1921

## 2022-11-25 ENCOUNTER — OFFICE VISIT (OUTPATIENT)
Dept: FAMILY MEDICINE CLINIC | Age: 87
End: 2022-11-25
Payer: MEDICARE

## 2022-11-25 VITALS
HEIGHT: 63 IN | BODY MASS INDEX: 23.74 KG/M2 | OXYGEN SATURATION: 97 % | SYSTOLIC BLOOD PRESSURE: 130 MMHG | TEMPERATURE: 97.1 F | DIASTOLIC BLOOD PRESSURE: 72 MMHG | HEART RATE: 77 BPM | WEIGHT: 134 LBS | RESPIRATION RATE: 16 BRPM

## 2022-11-25 DIAGNOSIS — N39.0 URINARY TRACT INFECTION WITHOUT HEMATURIA, SITE UNSPECIFIED: Primary | ICD-10-CM

## 2022-11-25 DIAGNOSIS — L08.9 RIGHT FOOT INFECTION: ICD-10-CM

## 2022-11-25 LAB
BILIRUBIN, POC: NORMAL
BLOOD URINE, POC: NORMAL
CLARITY, POC: CLEAR
COLOR, POC: YELLOW
GLUCOSE URINE, POC: NORMAL
KETONES, POC: NORMAL
LEUKOCYTE EST, POC: NORMAL
NITRITE, POC: NORMAL
PH, POC: 6
PROTEIN, POC: NORMAL
SPECIFIC GRAVITY, POC: 1.01
UROBILINOGEN, POC: NORMAL

## 2022-11-25 PROCEDURE — 3078F DIAST BP <80 MM HG: CPT | Performed by: PHYSICIAN ASSISTANT

## 2022-11-25 PROCEDURE — 3074F SYST BP LT 130 MM HG: CPT | Performed by: PHYSICIAN ASSISTANT

## 2022-11-25 PROCEDURE — 1123F ACP DISCUSS/DSCN MKR DOCD: CPT | Performed by: PHYSICIAN ASSISTANT

## 2022-11-25 PROCEDURE — 81003 URINALYSIS AUTO W/O SCOPE: CPT | Performed by: PHYSICIAN ASSISTANT

## 2022-11-25 PROCEDURE — 99213 OFFICE O/P EST LOW 20 MIN: CPT | Performed by: PHYSICIAN ASSISTANT

## 2022-11-25 RX ORDER — CEPHALEXIN 500 MG/1
500 CAPSULE ORAL 3 TIMES DAILY
Qty: 30 CAPSULE | Refills: 0 | Status: SHIPPED | OUTPATIENT
Start: 2022-11-25 | End: 2022-12-05

## 2022-11-25 SDOH — ECONOMIC STABILITY: FOOD INSECURITY: WITHIN THE PAST 12 MONTHS, THE FOOD YOU BOUGHT JUST DIDN'T LAST AND YOU DIDN'T HAVE MONEY TO GET MORE.: NEVER TRUE

## 2022-11-25 SDOH — ECONOMIC STABILITY: FOOD INSECURITY: WITHIN THE PAST 12 MONTHS, YOU WORRIED THAT YOUR FOOD WOULD RUN OUT BEFORE YOU GOT MONEY TO BUY MORE.: NEVER TRUE

## 2022-11-25 ASSESSMENT — PATIENT HEALTH QUESTIONNAIRE - PHQ9
SUM OF ALL RESPONSES TO PHQ QUESTIONS 1-9: 0
SUM OF ALL RESPONSES TO PHQ QUESTIONS 1-9: 0
5. POOR APPETITE OR OVEREATING: 0
9. THOUGHTS THAT YOU WOULD BE BETTER OFF DEAD, OR OF HURTING YOURSELF: 0
SUM OF ALL RESPONSES TO PHQ QUESTIONS 1-9: 0
SUM OF ALL RESPONSES TO PHQ9 QUESTIONS 1 & 2: 0
SUM OF ALL RESPONSES TO PHQ QUESTIONS 1-9: 0
1. LITTLE INTEREST OR PLEASURE IN DOING THINGS: 0
4. FEELING TIRED OR HAVING LITTLE ENERGY: 0
8. MOVING OR SPEAKING SO SLOWLY THAT OTHER PEOPLE COULD HAVE NOTICED. OR THE OPPOSITE, BEING SO FIGETY OR RESTLESS THAT YOU HAVE BEEN MOVING AROUND A LOT MORE THAN USUAL: 0
10. IF YOU CHECKED OFF ANY PROBLEMS, HOW DIFFICULT HAVE THESE PROBLEMS MADE IT FOR YOU TO DO YOUR WORK, TAKE CARE OF THINGS AT HOME, OR GET ALONG WITH OTHER PEOPLE: 0
2. FEELING DOWN, DEPRESSED OR HOPELESS: 0
6. FEELING BAD ABOUT YOURSELF - OR THAT YOU ARE A FAILURE OR HAVE LET YOURSELF OR YOUR FAMILY DOWN: 0
7. TROUBLE CONCENTRATING ON THINGS, SUCH AS READING THE NEWSPAPER OR WATCHING TELEVISION: 0
3. TROUBLE FALLING OR STAYING ASLEEP: 0

## 2022-11-25 ASSESSMENT — ENCOUNTER SYMPTOMS
DIARRHEA: 0
CHEST TIGHTNESS: 0
SINUS PRESSURE: 0
COUGH: 0
ABDOMINAL PAIN: 0
BACK PAIN: 0
TROUBLE SWALLOWING: 0
SHORTNESS OF BREATH: 0
VOMITING: 0

## 2022-11-25 ASSESSMENT — SOCIAL DETERMINANTS OF HEALTH (SDOH): HOW HARD IS IT FOR YOU TO PAY FOR THE VERY BASICS LIKE FOOD, HOUSING, MEDICAL CARE, AND HEATING?: NOT HARD AT ALL

## 2022-11-25 NOTE — PROGRESS NOTES
Subjective:      Patient ID: Bora Vaughn is a 82 y.o. female who presents today for:  Chief Complaint   Patient presents with    Foot Pain     Patient presents today with a left foot callus that looks infected which has given her fever and body aches since Wednesday. She is still having an UTI issue as well. Other     Patient presents today with her bladder prolapsed. HPI      Past Medical History:   Diagnosis Date    Anxiety     Arthritis     Chronic back pain     DEGENERATIVE BACK    Depression     Distal radial fracture 2012    GERD (gastroesophageal reflux disease)     Glaucoma     History of mammography, screening 2011 CAT 2 BILAT    History of osteopenia     Hypertension     meds > 8 yrs / denies TIA or stroke    Lumbar radicular pain     Polycythemia vera(238.4)     Rib fracture 11/30/2018    Senile osteoporosis     Urinary tract infection without hematuria 11/25/2022     Past Surgical History:   Procedure Laterality Date    APPENDECTOMY      BACK SURGERY      COLONOSCOPY      ENDOSCOPY, COLON, DIAGNOSTIC      EYE SURGERY      OS eye glaucoma OR    FRACTURE SURGERY Left 05/06/2012    distal radial fracture/Dr. Tae Verdin    HYSTERECTOMY (CERVIX STATUS UNKNOWN)      DE PERQ VERTEBROPLASTY UNI/BI INJX CERVICOTHORACIC N/A 3/7/2018    T-11, T-12 VERTEBRAL AUGMENTATION performed by Bonita Castro MD at [address] [place] [address] History     Socioeconomic History    Marital status:       Spouse name: Not on file    Number of children: Not on file    Years of education: Not on file    Highest education level: Not on file   Occupational History    Not on file   Tobacco Use    Smoking status: Never    Smokeless tobacco: Never   Substance and Sexual Activity    Alcohol use: No     Comment: denies    Drug use: No    Sexual activity: Never   Other Topics Concern    Not on file   Social History Narrative    Not on file     Social Determinants of Health     Financial Resource Strain: Low Risk     Difficulty of Paying Living Expenses: Not hard at all   Food Insecurity: No Food Insecurity    Worried About [address] [employer] in the Last Year: Never true    Ran Out of Food in the Last Year: Never true   Transportation Needs: No Transportation Needs    Lack of Transportation (Medical): No    Lack of Transportation (Non-Medical): No   Physical Activity: Not on file   Stress: Not on file   Social Connections: Not on file   Intimate Partner Violence: Not on file   Housing Stability: Not on file     Family History   Problem Relation Age of Onset    Breast Cancer Daughter     Cancer Daughter         kidney cancer    Diabetes Mother     Diabetes Father     High Cholesterol Sister     Other Sister          of old age    Other Brother         accident / car fall on him    Cancer Son         prostate cancer     Allergies   Allergen Reactions    Hydromorphone Other (See Comments)    Dilaudid [Fd&C Blue #1 Al Cornejo-Hydromorphone] Other (See Comments)     crazziness     Current Outpatient Medications   Medication Sig Dispense Refill    cephALEXin (KEFLEX) 500 MG capsule Take 1 capsule by mouth 3 times daily for 10 days 30 capsule 0    lisinopril (PRINIVIL;ZESTRIL) 10 MG tablet TAKE ONE TABLET BY MOUTH EVERY DAY 90 tablet 3    metoprolol succinate (TOPROL XL) 25 MG extended release tablet TAKE ONE TABLET BY MOUTH EVERY DAY 90 tablet 3    lansoprazole (PREVACID) 30 MG delayed release capsule Take on tablet daily 90 capsule 3    ibandronate (BONIVA) 150 MG tablet TAKE 1 TABLET BY MOUTH EVERY 30 DAYS IN THE MORNING WITH A FULL GLASS OF WATER ON EMPTY STOMACH.  DO NOT TAKE ANYTHING ELSE OR LIE DOWN FOR 30 MINUTES 4 tablet 11    diclofenac sodium (VOLTAREN) 1 % GEL Apply 4 g topically 4 times daily 4 Tube 3    Handicap Placard MISC by Does not apply route Start date:  2019 Expiration date 2024 1 each 0    ibuprofen (ADVIL;MOTRIN) 800 MG tablet TAKE ONE TABLET BY MOUTH EVERY DAY WITH FOOD AS NEEDED FOR PAIN 30 tablet 2    Nutritional Supplements (ENSURE COMPLETE SHAKE) LIQD Take 1 Can by mouth 3 times daily Strawberry or chocolate flavor 90 Bottle 5    bimatoprost (LUMIGAN) 0.01 % SOLN ophthalmic drops Place 1 drop into both eyes every evening       No current facility-administered medications for this visit. Review of Systems   Constitutional:  Negative for activity change, appetite change, chills, fever and unexpected weight change. HENT:  Negative for drooling, ear pain, nosebleeds, sinus pressure and trouble swallowing. Respiratory:  Negative for cough, chest tightness and shortness of breath. Cardiovascular:  Negative for chest pain and leg swelling. Gastrointestinal:  Negative for abdominal pain, diarrhea and vomiting. Endocrine: Negative for polydipsia and polyphagia. Genitourinary:  Negative for dysuria, flank pain and frequency. Musculoskeletal:  Negative for back pain and myalgias. Skin:  Positive for wound. Negative for pallor and rash. Neurological:  Negative for syncope, weakness and headaches. Hematological:  Does not bruise/bleed easily. Psychiatric/Behavioral:  Negative for agitation, behavioral problems and confusion. All other systems reviewed and are negative. Objective:   /72 (Site: Right Upper Arm, Position: Sitting, Cuff Size: Medium Adult)   Pulse 77   Temp 97.1 °F (36.2 °C) (Temporal)   Resp 16   Ht 5' 3\" (1.6 m)   Wt 134 lb (60.8 kg)   LMP  (LMP Unknown)   SpO2 97%   BMI 23.74 kg/m²     Physical Exam  Vitals and nursing note reviewed. Constitutional:       General: She is awake. She is not in acute distress. Appearance: Normal appearance. She is well-developed and normal weight. She is not ill-appearing, toxic-appearing or diaphoretic. Comments: No photophobia. No phonophobia. HENT:      Head: Normocephalic and atraumatic. No Kuhn's sign.       Right Ear: External ear normal.      Left Ear: External ear normal.      Nose: Nose normal. No congestion or rhinorrhea. Mouth/Throat:      Mouth: Mucous membranes are moist.      Pharynx: Oropharynx is clear. No oropharyngeal exudate or posterior oropharyngeal erythema. Eyes:      General: No scleral icterus. Right eye: No foreign body or discharge. Left eye: No discharge. Extraocular Movements: Extraocular movements intact. Conjunctiva/sclera: Conjunctivae normal.      Left eye: No exudate. Pupils: Pupils are equal, round, and reactive to light. Neck:      Vascular: No JVD. Trachea: No tracheal deviation. Comments: No meningismus. Cardiovascular:      Rate and Rhythm: Normal rate and regular rhythm. Heart sounds: Normal heart sounds. Heart sounds not distant. No murmur heard. No friction rub. No gallop. Pulmonary:      Effort: Pulmonary effort is normal. No respiratory distress. Breath sounds: Normal breath sounds. No stridor. No wheezing, rhonchi or rales. Chest:      Chest wall: No tenderness. Abdominal:      General: Abdomen is flat. Bowel sounds are normal. There is no distension. Palpations: Abdomen is soft. There is no mass. Tenderness: There is no abdominal tenderness. There is no right CVA tenderness, left CVA tenderness, guarding or rebound. Hernia: No hernia is present. Musculoskeletal:         General: No swelling, tenderness, deformity or signs of injury. Normal range of motion. Cervical back: Normal range of motion and neck supple. No rigidity. Lymphadenopathy:      Head:      Right side of head: No submental adenopathy. Left side of head: No submental adenopathy. Skin:     General: Skin is warm and dry. Capillary Refill: Capillary refill takes less than 2 seconds. Coloration: Skin is not jaundiced or pale. Findings: Lesion present. No bruising, erythema or rash. Neurological:      General: No focal deficit present. Mental Status: She is alert and oriented to person, place, and time. Mental status is at baseline. Cranial Nerves: No cranial nerve deficit. Sensory: No sensory deficit. Motor: No weakness. Coordination: Coordination normal.      Deep Tendon Reflexes: Reflexes are normal and symmetric. Psychiatric:         Mood and Affect: Mood normal.         Behavior: Behavior normal. Behavior is cooperative. Thought Content: Thought content normal.         Judgment: Judgment normal.       Assessment:       Diagnosis Orders   1. Urinary tract infection without hematuria, site unspecified  Culture, Urine    POCT Urinalysis No Micro (Auto)    cephALEXin (KEFLEX) 500 MG capsule      2. Right foot infection  cephALEXin (KEFLEX) 500 MG capsule        Results for POC orders placed in visit on 11/25/22   POCT Urinalysis No Micro (Auto)   Result Value Ref Range    Color, UA YELLOW     Clarity, UA CLEAR     Glucose, UA POC NEG     Bilirubin, UA NEG     Ketones, UA NEG     Spec Grav, UA 1.015     Blood, UA POC NEG     pH, UA 6.0     Protein, UA POC NEG     Urobilinogen, UA NEG     Leukocytes, UA NEG     Nitrite, UA NEG       Plan:     Assessment & Plan   Carlin was seen today for foot pain and other. Diagnoses and all orders for this visit:    Urinary tract infection without hematuria, site unspecified  -     Culture, Urine  -     POCT Urinalysis No Micro (Auto)  -     cephALEXin (KEFLEX) 500 MG capsule; Take 1 capsule by mouth 3 times daily for 10 days    Right foot infection  -     cephALEXin (KEFLEX) 500 MG capsule; Take 1 capsule by mouth 3 times daily for 10 days    Orders Placed This Encounter   Procedures    Culture, Urine     Order Specific Question:   Specify (ex-cath, midstream, cysto, etc)?      Answer:   midstream    POCT Urinalysis No Micro (Auto)     Orders Placed This Encounter   Medications    cephALEXin (KEFLEX) 500 MG capsule     Sig: Take 1 capsule by mouth 3 times daily for 10 days     Dispense:  30 capsule     Refill:  0     There are no discontinued medications. Return if symptoms worsen or fail to improve. Reviewed with the patient/family: current clinical status & medications. Side effects of the medication prescribed today, as well as treatment plan/rationale and result expectations have been discussed with the patient/family who expresses understanding. Patient will be discharged home in stable condition. Follow up with PCP to evaluate treatment results or return if symptoms worsen or fail to improve. Discussed signs and symptoms which require immediate follow-up in ED/call to 911. Understanding verbalized. I have reviewed the patient's medical history in detail and updated the computerized patient record.     KIRBY Rushing

## 2022-11-28 ENCOUNTER — TELEPHONE (OUTPATIENT)
Dept: FAMILY MEDICINE CLINIC | Age: 87
End: 2022-11-28

## 2022-11-28 NOTE — TELEPHONE ENCOUNTER
----- Message from Mynor Hughes sent at 11/28/2022  2:29 PM EST -----  Subject: Message to Provider    QUESTIONS  Information for Provider? Insurance cm called to update pcp Julissa Baeza to discuss patient recently being seen at ed and wanting to   check if there was a follow up scheduled or not. Please call to discuss   with Seun Buckley  ---------------------------------------------------------------------------  --------------  Indu Hussein INFO  [phone]; OK to leave message on voicemail  ---------------------------------------------------------------------------  --------------  SCRIPT ANSWERS  Relationship to Patient? Third Party  Third Party Type? Insurance? Representative Name?  Seun Buckley

## 2022-11-29 NOTE — TELEPHONE ENCOUNTER
Called patient spoke with patient's daughter Fran Pan (HIPAA) who stated she went to ED put was not admitted. Patient is schedule to come in OV on 12/07/2022 at 8:15.

## 2022-12-09 NOTE — PROGRESS NOTES
Patient seen in ED, has no OBGYN provider on file. Call and check if needs follow up for Female Bladder Prolapse (Primary) and to schedule with our office.

## 2022-12-17 DIAGNOSIS — K21.00 GASTROESOPHAGEAL REFLUX DISEASE WITH ESOPHAGITIS, UNSPECIFIED WHETHER HEMORRHAGE: ICD-10-CM

## 2022-12-19 RX ORDER — LANSOPRAZOLE 30 MG/1
CAPSULE, DELAYED RELEASE ORAL
Qty: 90 CAPSULE | Refills: 3 | Status: SHIPPED | OUTPATIENT
Start: 2022-12-19

## 2022-12-19 NOTE — TELEPHONE ENCOUNTER
Future Appointments    Encounter Information    Provider Department Appt Notes   5/11/2023 PAULA Goodwin AT [facility] 6 mo f/u //sxc     Past Visits    Date Provider Specialty Visit Type Primary Dx   11/25/2022 [facility] Visit Urinary tract infection without hematuria, site unspecified   11/10/2022 [facility] Office Visit Essential hypertension

## 2022-12-28 DIAGNOSIS — I10 ESSENTIAL HYPERTENSION: ICD-10-CM

## 2022-12-28 RX ORDER — METOPROLOL SUCCINATE 25 MG/1
TABLET, EXTENDED RELEASE ORAL
Qty: 90 TABLET | Refills: 3 | Status: SHIPPED | OUTPATIENT
Start: 2022-12-28

## 2023-01-01 ENCOUNTER — OFFICE VISIT (OUTPATIENT)
Dept: GERIATRIC MEDICINE | Age: 88
End: 2023-01-01
Payer: COMMERCIAL

## 2023-01-01 DIAGNOSIS — N39.0 RECURRENT UTI: ICD-10-CM

## 2023-01-01 DIAGNOSIS — E87.0 HYPERNATREMIA: Primary | ICD-10-CM

## 2023-01-01 DIAGNOSIS — R62.7 FAILURE TO THRIVE IN ADULT: ICD-10-CM

## 2023-01-01 LAB
BILIRUBIN, URINE: ABNORMAL
BLOOD, URINE: NEGATIVE
CLARITY: CLEAR
COLOR: ABNORMAL
GLUCOSE URINE: NEGATIVE
KETONES, URINE: POSITIVE
LEUKOCYTE ESTERASE, URINE: ABNORMAL
NITRITE, URINE: NEGATIVE
PH UA: 5 (ref 4.5–8)
PROTEIN UA: ABNORMAL
SPECIFIC GRAVITY, URINE: 1.03
UROBILINOGEN, URINE: NORMAL

## 2023-01-01 PROCEDURE — G8484 FLU IMMUNIZE NO ADMIN: HCPCS | Performed by: PHYSICIAN ASSISTANT

## 2023-01-01 PROCEDURE — G9692 HOSP RECD BY PT DUR MSMT PER: HCPCS | Performed by: PHYSICIAN ASSISTANT

## 2023-01-01 PROCEDURE — 99309 SBSQ NF CARE MODERATE MDM 30: CPT | Performed by: PHYSICIAN ASSISTANT

## 2023-02-08 ENCOUNTER — TELEPHONE (OUTPATIENT)
Dept: FAMILY MEDICINE CLINIC | Age: 88
End: 2023-02-08

## 2023-02-08 NOTE — TELEPHONE ENCOUNTER
----- Message from Asim Lida sent at 2/8/2023  9:34 AM EST -----  Subject: Message to Provider    QUESTIONS  Information for Provider? Wen Yee, Patient's daughter would like   to know if her mon can do her lab without fasting so they can do it   anytime. She lives by herself and it is easy for her daughter to do it   early. ---------------------------------------------------------------------------  --------------  Jamison Duane INFO  [phone]; OK to leave message on voicemail  ---------------------------------------------------------------------------  --------------  SCRIPT ANSWERS  Relationship to Patient? Other  Representative Name? Wen Yee  Is the Representative on the appropriate HIPAA document in Epic?  Yes

## 2023-03-28 DIAGNOSIS — I10 ESSENTIAL HYPERTENSION: ICD-10-CM

## 2023-03-28 RX ORDER — LISINOPRIL 10 MG/1
TABLET ORAL
Qty: 90 TABLET | Refills: 3 | Status: SHIPPED | OUTPATIENT
Start: 2023-03-28

## 2023-03-28 NOTE — TELEPHONE ENCOUNTER
Pharmacy requesting medication refill.  Please approve or deny this request.    Rx requested:  Requested Prescriptions     Pending Prescriptions Disp Refills    lisinopril (PRINIVIL;ZESTRIL) 10 MG tablet [Pharmacy Med Name: LISINOPRIL 10MG TABS] 90 tablet 3     Sig: TAKE ONE TABLET BY MOUTH EVERY DAY         Last Office Visit:   11/10/2022      Next Visit Date:  Future Appointments   Date Time Provider Malika Jackson   5/11/2023 11:00 AM Julissa Baeza PA-C [address]

## 2023-04-07 ENCOUNTER — HOSPITAL ENCOUNTER (OUTPATIENT)
Age: 88
Setting detail: OBSERVATION
Discharge: NURSING, REHAB OR LONG-TERM CARE | End: 2023-04-13
Attending: INTERNAL MEDICINE | Admitting: INTERNAL MEDICINE
Payer: MEDICARE

## 2023-04-07 ENCOUNTER — APPOINTMENT (OUTPATIENT)
Dept: GENERAL RADIOLOGY | Age: 88
End: 2023-04-07
Payer: MEDICARE

## 2023-04-07 ENCOUNTER — APPOINTMENT (OUTPATIENT)
Dept: CT IMAGING | Age: 88
End: 2023-04-07
Payer: MEDICARE

## 2023-04-07 DIAGNOSIS — R41.82 ALTERED MENTAL STATUS, UNSPECIFIED ALTERED MENTAL STATUS TYPE: Primary | ICD-10-CM

## 2023-04-07 DIAGNOSIS — I16.0 HYPERTENSIVE URGENCY: ICD-10-CM

## 2023-04-07 LAB
ALBUMIN SERPL-MCNC: 4.7 G/DL (ref 3.5–4.6)
ALP SERPL-CCNC: 73 U/L (ref 40–130)
ALT SERPL-CCNC: 15 U/L (ref 0–33)
AMPHET UR QL SCN: NORMAL
ANION GAP SERPL CALCULATED.3IONS-SCNC: 12 MEQ/L (ref 9–15)
AST SERPL-CCNC: 28 U/L (ref 0–35)
B PARAP IS1001 DNA NPH QL NAA+NON-PROBE: NOT DETECTED
B PERT.PT PRMT NPH QL NAA+NON-PROBE: NOT DETECTED
BACTERIA URNS QL MICRO: NEGATIVE /HPF
BARBITURATES UR QL SCN: NORMAL
BASOPHILS # BLD: 0.1 K/UL (ref 0–0.2)
BASOPHILS NFR BLD: 0.7 %
BENZODIAZ UR QL SCN: NORMAL
BILIRUB SERPL-MCNC: 0.4 MG/DL (ref 0.2–0.7)
BILIRUB UR QL STRIP: NEGATIVE
BNP BLD-MCNC: 411 PG/ML
BUN SERPL-MCNC: 18 MG/DL (ref 8–23)
C PNEUM DNA NPH QL NAA+NON-PROBE: NOT DETECTED
CALCIUM SERPL-MCNC: 9.4 MG/DL (ref 8.5–9.9)
CANNABINOIDS UR QL SCN: NORMAL
CHLORIDE SERPL-SCNC: 98 MEQ/L (ref 95–107)
CK SERPL-CCNC: 163 U/L (ref 0–170)
CLARITY UR: ABNORMAL
CO2 SERPL-SCNC: 24 MEQ/L (ref 20–31)
COCAINE UR QL SCN: NORMAL
COLOR UR: YELLOW
CREAT SERPL-MCNC: 0.74 MG/DL (ref 0.5–0.9)
DRUG SCREEN COMMENT UR-IMP: NORMAL
EOSINOPHIL # BLD: 0.1 K/UL (ref 0–0.7)
EOSINOPHIL NFR BLD: 0.9 %
EPI CELLS #/AREA URNS AUTO: NORMAL /HPF (ref 0–5)
ERYTHROCYTE [DISTWIDTH] IN BLOOD BY AUTOMATED COUNT: 13 % (ref 11.5–14.5)
ETHANOL PERCENT: NORMAL G/DL
ETHANOLAMINE SERPL-MCNC: <10 MG/DL (ref 0–0.08)
FENTANYL SCREEN, URINE: NORMAL
FLUAV RNA NPH QL NAA+NON-PROBE: NOT DETECTED
FLUBV RNA NPH QL NAA+NON-PROBE: NOT DETECTED
GLOBULIN SER CALC-MCNC: 2.4 G/DL (ref 2.3–3.5)
GLUCOSE SERPL-MCNC: 105 MG/DL (ref 70–99)
GLUCOSE UR STRIP-MCNC: NEGATIVE MG/DL
HADV DNA NPH QL NAA+NON-PROBE: NOT DETECTED
HCOV 229E RNA NPH QL NAA+NON-PROBE: NOT DETECTED
HCOV HKU1 RNA NPH QL NAA+NON-PROBE: NOT DETECTED
HCOV NL63 RNA NPH QL NAA+NON-PROBE: NOT DETECTED
HCOV OC43 RNA NPH QL NAA+NON-PROBE: NOT DETECTED
HCT VFR BLD AUTO: 48.2 % (ref 37–47)
HGB BLD-MCNC: 16.2 G/DL (ref 12–16)
HGB UR QL STRIP: NEGATIVE
HMPV RNA NPH QL NAA+NON-PROBE: NOT DETECTED
HPIV1 RNA NPH QL NAA+NON-PROBE: NOT DETECTED
HPIV2 RNA NPH QL NAA+NON-PROBE: NOT DETECTED
HPIV3 RNA NPH QL NAA+NON-PROBE: NOT DETECTED
HPIV4 RNA NPH QL NAA+NON-PROBE: NOT DETECTED
HYALINE CASTS #/AREA URNS AUTO: NORMAL /HPF (ref 0–5)
KETONES UR STRIP-MCNC: NEGATIVE MG/DL
LACTATE BLDV-SCNC: 1.1 MMOL/L (ref 0.5–2.2)
LEUKOCYTE ESTERASE UR QL STRIP: ABNORMAL
LYMPHOCYTES # BLD: 2 K/UL (ref 1–4.8)
LYMPHOCYTES NFR BLD: 19 %
M PNEUMO DNA NPH QL NAA+NON-PROBE: NOT DETECTED
MAGNESIUM SERPL-MCNC: 2 MG/DL (ref 1.7–2.4)
MCH RBC QN AUTO: 33.2 PG (ref 27–31.3)
MCHC RBC AUTO-ENTMCNC: 33.5 % (ref 33–37)
MCV RBC AUTO: 99.1 FL (ref 79.4–94.8)
METHADONE UR QL SCN: NORMAL
MONOCYTES # BLD: 1 K/UL (ref 0.2–0.8)
MONOCYTES NFR BLD: 9.1 %
NEUTROPHILS # BLD: 7.4 K/UL (ref 1.4–6.5)
NEUTS SEG NFR BLD: 70.3 %
NITRITE UR QL STRIP: NEGATIVE
OPIATES UR QL SCN: NORMAL
OXYCODONE UR QL SCN: NORMAL
PCP UR QL SCN: NORMAL
PH UR STRIP: 7.5 [PH] (ref 5–9)
PLATELET # BLD AUTO: 289 K/UL (ref 130–400)
POTASSIUM SERPL-SCNC: 4.4 MEQ/L (ref 3.4–4.9)
PROCALCITONIN SERPL IA-MCNC: 0.04 NG/ML (ref 0–0.15)
PROPOXYPH UR QL SCN: NORMAL
PROT SERPL-MCNC: 7.1 G/DL (ref 6.3–8)
PROT UR STRIP-MCNC: NEGATIVE MG/DL
RBC # BLD AUTO: 4.87 M/UL (ref 4.2–5.4)
RBC #/AREA URNS AUTO: NORMAL /HPF (ref 0–5)
RSV RNA NPH QL NAA+NON-PROBE: NOT DETECTED
RV+EV RNA NPH QL NAA+NON-PROBE: NOT DETECTED
SARS-COV-2 RNA NPH QL NAA+NON-PROBE: NOT DETECTED
SODIUM SERPL-SCNC: 134 MEQ/L (ref 135–144)
SP GR UR STRIP: 1.01 (ref 1–1.03)
TROPONIN T SERPL-MCNC: <0.01 NG/ML (ref 0–0.01)
TSH REFLEX: 1.25 UIU/ML (ref 0.44–3.86)
URINE REFLEX TO CULTURE: ABNORMAL
UROBILINOGEN UR STRIP-ACNC: 0.2 E.U./DL
WBC # BLD AUTO: 10.5 K/UL (ref 4.8–10.8)
WBC #/AREA URNS AUTO: NORMAL /HPF (ref 0–5)

## 2023-04-07 PROCEDURE — 80307 DRUG TEST PRSMV CHEM ANLYZR: CPT

## 2023-04-07 PROCEDURE — 6360000002 HC RX W HCPCS: Performed by: STUDENT IN AN ORGANIZED HEALTH CARE EDUCATION/TRAINING PROGRAM

## 2023-04-07 PROCEDURE — 83735 ASSAY OF MAGNESIUM: CPT

## 2023-04-07 PROCEDURE — 70450 CT HEAD/BRAIN W/O DYE: CPT

## 2023-04-07 PROCEDURE — 6370000000 HC RX 637 (ALT 250 FOR IP): Performed by: STUDENT IN AN ORGANIZED HEALTH CARE EDUCATION/TRAINING PROGRAM

## 2023-04-07 PROCEDURE — 96374 THER/PROPH/DIAG INJ IV PUSH: CPT

## 2023-04-07 PROCEDURE — 93005 ELECTROCARDIOGRAM TRACING: CPT | Performed by: STUDENT IN AN ORGANIZED HEALTH CARE EDUCATION/TRAINING PROGRAM

## 2023-04-07 PROCEDURE — 82550 ASSAY OF CK (CPK): CPT

## 2023-04-07 PROCEDURE — 80053 COMPREHEN METABOLIC PANEL: CPT

## 2023-04-07 PROCEDURE — 71045 X-RAY EXAM CHEST 1 VIEW: CPT

## 2023-04-07 PROCEDURE — 83880 ASSAY OF NATRIURETIC PEPTIDE: CPT

## 2023-04-07 PROCEDURE — 82077 ASSAY SPEC XCP UR&BREATH IA: CPT

## 2023-04-07 PROCEDURE — 84145 PROCALCITONIN (PCT): CPT

## 2023-04-07 PROCEDURE — 36415 COLL VENOUS BLD VENIPUNCTURE: CPT

## 2023-04-07 PROCEDURE — 81001 URINALYSIS AUTO W/SCOPE: CPT

## 2023-04-07 PROCEDURE — 0202U NFCT DS 22 TRGT SARS-COV-2: CPT

## 2023-04-07 PROCEDURE — 99285 EMERGENCY DEPT VISIT HI MDM: CPT

## 2023-04-07 PROCEDURE — 85025 COMPLETE CBC W/AUTO DIFF WBC: CPT

## 2023-04-07 PROCEDURE — 83605 ASSAY OF LACTIC ACID: CPT

## 2023-04-07 PROCEDURE — 84443 ASSAY THYROID STIM HORMONE: CPT

## 2023-04-07 PROCEDURE — 84484 ASSAY OF TROPONIN QUANT: CPT

## 2023-04-07 RX ORDER — ACETAMINOPHEN 500 MG
1000 TABLET ORAL
Status: COMPLETED | OUTPATIENT
Start: 2023-04-07 | End: 2023-04-07

## 2023-04-07 RX ORDER — HYDRALAZINE HYDROCHLORIDE 20 MG/ML
10 INJECTION INTRAMUSCULAR; INTRAVENOUS ONCE
Status: COMPLETED | OUTPATIENT
Start: 2023-04-07 | End: 2023-04-07

## 2023-04-07 RX ADMIN — HYDRALAZINE HYDROCHLORIDE 10 MG: 20 INJECTION INTRAMUSCULAR; INTRAVENOUS at 23:54

## 2023-04-07 RX ADMIN — ACETAMINOPHEN 1000 MG: 500 TABLET ORAL at 23:53

## 2023-04-07 ASSESSMENT — ENCOUNTER SYMPTOMS
CHEST TIGHTNESS: 0
EYE PAIN: 0
DIARRHEA: 0
BACK PAIN: 0
SHORTNESS OF BREATH: 0
SORE THROAT: 0
NAUSEA: 0

## 2023-04-07 ASSESSMENT — PAIN DESCRIPTION - LOCATION: LOCATION: HAND

## 2023-04-07 ASSESSMENT — PAIN SCALES - GENERAL: PAINLEVEL_OUTOF10: 4

## 2023-04-07 ASSESSMENT — PAIN DESCRIPTION - DESCRIPTORS: DESCRIPTORS: ACHING

## 2023-04-07 ASSESSMENT — PAIN DESCRIPTION - ORIENTATION: ORIENTATION: RIGHT

## 2023-04-07 ASSESSMENT — PAIN - FUNCTIONAL ASSESSMENT
PAIN_FUNCTIONAL_ASSESSMENT: 0-10
PAIN_FUNCTIONAL_ASSESSMENT: 0-10
PAIN_FUNCTIONAL_ASSESSMENT: NONE - DENIES PAIN
PAIN_FUNCTIONAL_ASSESSMENT: NONE - DENIES PAIN

## 2023-04-07 ASSESSMENT — PAIN DESCRIPTION - PAIN TYPE: TYPE: ACUTE PAIN

## 2023-04-08 PROBLEM — K92.0 COFFEE GROUND EMESIS: Status: ACTIVE | Noted: 2023-04-08

## 2023-04-08 LAB
ALBUMIN SERPL-MCNC: 4 G/DL (ref 3.5–4.6)
ALP SERPL-CCNC: 63 U/L (ref 40–130)
ALT SERPL-CCNC: 12 U/L (ref 0–33)
ANION GAP SERPL CALCULATED.3IONS-SCNC: 12 MEQ/L (ref 9–15)
AST SERPL-CCNC: 22 U/L (ref 0–35)
BASOPHILS # BLD: 0.1 K/UL (ref 0–0.2)
BASOPHILS NFR BLD: 0.6 %
BILIRUB SERPL-MCNC: 0.6 MG/DL (ref 0.2–0.7)
BUN SERPL-MCNC: 17 MG/DL (ref 8–23)
CALCIUM SERPL-MCNC: 8.9 MG/DL (ref 8.5–9.9)
CHLORIDE SERPL-SCNC: 99 MEQ/L (ref 95–107)
CO2 SERPL-SCNC: 23 MEQ/L (ref 20–31)
CREAT SERPL-MCNC: 0.62 MG/DL (ref 0.5–0.9)
EOSINOPHIL # BLD: 0 K/UL (ref 0–0.7)
EOSINOPHIL NFR BLD: 0.1 %
ERYTHROCYTE [DISTWIDTH] IN BLOOD BY AUTOMATED COUNT: 12.8 % (ref 11.5–14.5)
GLOBULIN SER CALC-MCNC: 2.4 G/DL (ref 2.3–3.5)
GLUCOSE SERPL-MCNC: 119 MG/DL (ref 70–99)
HCT VFR BLD AUTO: 45 % (ref 37–47)
HGB BLD-MCNC: 15.2 G/DL (ref 12–16)
LYMPHOCYTES # BLD: 1 K/UL (ref 1–4.8)
LYMPHOCYTES NFR BLD: 10.1 %
MAGNESIUM SERPL-MCNC: 1.9 MG/DL (ref 1.7–2.4)
MCH RBC QN AUTO: 33 PG (ref 27–31.3)
MCHC RBC AUTO-ENTMCNC: 33.7 % (ref 33–37)
MCV RBC AUTO: 97.8 FL (ref 79.4–94.8)
MONOCYTES # BLD: 0.5 K/UL (ref 0.2–0.8)
MONOCYTES NFR BLD: 5.1 %
NEUTROPHILS # BLD: 8.3 K/UL (ref 1.4–6.5)
NEUTS SEG NFR BLD: 84.1 %
PLATELET # BLD AUTO: 271 K/UL (ref 130–400)
POTASSIUM SERPL-SCNC: 3.5 MEQ/L (ref 3.4–4.9)
PROT SERPL-MCNC: 6.4 G/DL (ref 6.3–8)
RBC # BLD AUTO: 4.6 M/UL (ref 4.2–5.4)
SODIUM SERPL-SCNC: 134 MEQ/L (ref 135–144)
WBC # BLD AUTO: 9.8 K/UL (ref 4.8–10.8)

## 2023-04-08 PROCEDURE — G0378 HOSPITAL OBSERVATION PER HR: HCPCS

## 2023-04-08 PROCEDURE — 36415 COLL VENOUS BLD VENIPUNCTURE: CPT

## 2023-04-08 PROCEDURE — 96376 TX/PRO/DX INJ SAME DRUG ADON: CPT

## 2023-04-08 PROCEDURE — 83735 ASSAY OF MAGNESIUM: CPT

## 2023-04-08 PROCEDURE — 85025 COMPLETE CBC W/AUTO DIFF WBC: CPT

## 2023-04-08 PROCEDURE — 6360000002 HC RX W HCPCS

## 2023-04-08 PROCEDURE — 6360000002 HC RX W HCPCS: Performed by: STUDENT IN AN ORGANIZED HEALTH CARE EDUCATION/TRAINING PROGRAM

## 2023-04-08 PROCEDURE — 96372 THER/PROPH/DIAG INJ SC/IM: CPT

## 2023-04-08 PROCEDURE — 6370000000 HC RX 637 (ALT 250 FOR IP): Performed by: NURSE PRACTITIONER

## 2023-04-08 PROCEDURE — 2580000003 HC RX 258: Performed by: NURSE PRACTITIONER

## 2023-04-08 PROCEDURE — 97166 OT EVAL MOD COMPLEX 45 MIN: CPT

## 2023-04-08 PROCEDURE — 6360000002 HC RX W HCPCS: Performed by: NURSE PRACTITIONER

## 2023-04-08 PROCEDURE — 6370000000 HC RX 637 (ALT 250 FOR IP): Performed by: INTERNAL MEDICINE

## 2023-04-08 PROCEDURE — 80053 COMPREHEN METABOLIC PANEL: CPT

## 2023-04-08 PROCEDURE — C9113 INJ PANTOPRAZOLE SODIUM, VIA: HCPCS | Performed by: NURSE PRACTITIONER

## 2023-04-08 PROCEDURE — 96375 TX/PRO/DX INJ NEW DRUG ADDON: CPT

## 2023-04-08 PROCEDURE — A4216 STERILE WATER/SALINE, 10 ML: HCPCS | Performed by: NURSE PRACTITIONER

## 2023-04-08 PROCEDURE — 6360000002 HC RX W HCPCS: Performed by: INTERNAL MEDICINE

## 2023-04-08 PROCEDURE — 97162 PT EVAL MOD COMPLEX 30 MIN: CPT

## 2023-04-08 RX ORDER — METOCLOPRAMIDE HYDROCHLORIDE 5 MG/ML
10 INJECTION INTRAMUSCULAR; INTRAVENOUS ONCE
Status: COMPLETED | OUTPATIENT
Start: 2023-04-08 | End: 2023-04-08

## 2023-04-08 RX ORDER — HYDRALAZINE HYDROCHLORIDE 20 MG/ML
10 INJECTION INTRAMUSCULAR; INTRAVENOUS EVERY 6 HOURS PRN
Status: DISCONTINUED | OUTPATIENT
Start: 2023-04-08 | End: 2023-04-13 | Stop reason: HOSPADM

## 2023-04-08 RX ORDER — HALOPERIDOL 5 MG/ML
5 INJECTION INTRAMUSCULAR ONCE
Status: COMPLETED | OUTPATIENT
Start: 2023-04-08 | End: 2023-04-08

## 2023-04-08 RX ORDER — SODIUM CHLORIDE 0.9 % (FLUSH) 0.9 %
5-40 SYRINGE (ML) INJECTION EVERY 12 HOURS SCHEDULED
Status: DISCONTINUED | OUTPATIENT
Start: 2023-04-08 | End: 2023-04-13 | Stop reason: HOSPADM

## 2023-04-08 RX ORDER — ONDANSETRON 2 MG/ML
4 INJECTION INTRAMUSCULAR; INTRAVENOUS EVERY 6 HOURS PRN
Status: DISCONTINUED | OUTPATIENT
Start: 2023-04-08 | End: 2023-04-13 | Stop reason: HOSPADM

## 2023-04-08 RX ORDER — HYDROXYZINE HYDROCHLORIDE 25 MG/1
25 TABLET, FILM COATED ORAL EVERY 6 HOURS PRN
Status: DISCONTINUED | OUTPATIENT
Start: 2023-04-08 | End: 2023-04-13 | Stop reason: HOSPADM

## 2023-04-08 RX ORDER — ACETAMINOPHEN 325 MG/1
650 TABLET ORAL EVERY 6 HOURS PRN
Status: DISCONTINUED | OUTPATIENT
Start: 2023-04-08 | End: 2023-04-13 | Stop reason: HOSPADM

## 2023-04-08 RX ORDER — SODIUM CHLORIDE 9 MG/ML
INJECTION, SOLUTION INTRAVENOUS PRN
Status: DISCONTINUED | OUTPATIENT
Start: 2023-04-08 | End: 2023-04-13 | Stop reason: HOSPADM

## 2023-04-08 RX ORDER — ONDANSETRON 4 MG/1
4 TABLET, ORALLY DISINTEGRATING ORAL EVERY 8 HOURS PRN
Status: DISCONTINUED | OUTPATIENT
Start: 2023-04-08 | End: 2023-04-13 | Stop reason: HOSPADM

## 2023-04-08 RX ORDER — LISINOPRIL 10 MG/1
10 TABLET ORAL DAILY
Status: DISCONTINUED | OUTPATIENT
Start: 2023-04-09 | End: 2023-04-09

## 2023-04-08 RX ORDER — SODIUM CHLORIDE 0.9 % (FLUSH) 0.9 %
5-40 SYRINGE (ML) INJECTION PRN
Status: DISCONTINUED | OUTPATIENT
Start: 2023-04-08 | End: 2023-04-13 | Stop reason: HOSPADM

## 2023-04-08 RX ORDER — HALOPERIDOL 5 MG/ML
2 INJECTION INTRAMUSCULAR EVERY 4 HOURS PRN
Status: DISCONTINUED | OUTPATIENT
Start: 2023-04-08 | End: 2023-04-13 | Stop reason: HOSPADM

## 2023-04-08 RX ORDER — ENOXAPARIN SODIUM 100 MG/ML
40 INJECTION SUBCUTANEOUS DAILY
Status: DISCONTINUED | OUTPATIENT
Start: 2023-04-08 | End: 2023-04-13 | Stop reason: HOSPADM

## 2023-04-08 RX ORDER — KETOROLAC TROMETHAMINE 15 MG/ML
15 INJECTION, SOLUTION INTRAMUSCULAR; INTRAVENOUS ONCE
Status: COMPLETED | OUTPATIENT
Start: 2023-04-08 | End: 2023-04-08

## 2023-04-08 RX ORDER — ACETAMINOPHEN 650 MG/1
650 SUPPOSITORY RECTAL EVERY 6 HOURS PRN
Status: DISCONTINUED | OUTPATIENT
Start: 2023-04-08 | End: 2023-04-13 | Stop reason: HOSPADM

## 2023-04-08 RX ORDER — LORAZEPAM 2 MG/ML
1 INJECTION INTRAMUSCULAR ONCE
Status: COMPLETED | OUTPATIENT
Start: 2023-04-08 | End: 2023-04-08

## 2023-04-08 RX ORDER — HALOPERIDOL 5 MG/ML
INJECTION INTRAMUSCULAR
Status: COMPLETED
Start: 2023-04-08 | End: 2023-04-08

## 2023-04-08 RX ORDER — METOPROLOL SUCCINATE 25 MG/1
25 TABLET, EXTENDED RELEASE ORAL DAILY
Status: DISCONTINUED | OUTPATIENT
Start: 2023-04-08 | End: 2023-04-10

## 2023-04-08 RX ORDER — LATANOPROST 50 UG/ML
1 SOLUTION/ DROPS OPHTHALMIC NIGHTLY
Status: DISCONTINUED | OUTPATIENT
Start: 2023-04-08 | End: 2023-04-08

## 2023-04-08 RX ORDER — LISINOPRIL 20 MG/1
20 TABLET ORAL ONCE
Status: COMPLETED | OUTPATIENT
Start: 2023-04-08 | End: 2023-04-08

## 2023-04-08 RX ORDER — POLYETHYLENE GLYCOL 3350 17 G/17G
17 POWDER, FOR SOLUTION ORAL DAILY PRN
Status: DISCONTINUED | OUTPATIENT
Start: 2023-04-08 | End: 2023-04-13 | Stop reason: HOSPADM

## 2023-04-08 RX ORDER — QUETIAPINE FUMARATE 50 MG/1
50 TABLET, FILM COATED ORAL ONCE
Status: COMPLETED | OUTPATIENT
Start: 2023-04-08 | End: 2023-04-08

## 2023-04-08 RX ORDER — LABETALOL HYDROCHLORIDE 5 MG/ML
10 INJECTION, SOLUTION INTRAVENOUS EVERY 4 HOURS PRN
Status: DISCONTINUED | OUTPATIENT
Start: 2023-04-08 | End: 2023-04-13 | Stop reason: HOSPADM

## 2023-04-08 RX ADMIN — ENOXAPARIN SODIUM 40 MG: 100 INJECTION SUBCUTANEOUS at 11:55

## 2023-04-08 RX ADMIN — HYDROXYZINE HYDROCHLORIDE 25 MG: 25 TABLET, FILM COATED ORAL at 11:55

## 2023-04-08 RX ADMIN — METOCLOPRAMIDE HYDROCHLORIDE 10 MG: 5 INJECTION INTRAMUSCULAR; INTRAVENOUS at 21:06

## 2023-04-08 RX ADMIN — HALOPERIDOL LACTATE 2 MG: 5 INJECTION, SOLUTION INTRAMUSCULAR at 17:21

## 2023-04-08 RX ADMIN — LISINOPRIL 20 MG: 20 TABLET ORAL at 21:05

## 2023-04-08 RX ADMIN — PANTOPRAZOLE SODIUM 40 MG: 40 INJECTION, POWDER, FOR SOLUTION INTRAVENOUS at 21:07

## 2023-04-08 RX ADMIN — HALOPERIDOL LACTATE 5 MG: 5 INJECTION, SOLUTION INTRAMUSCULAR at 02:45

## 2023-04-08 RX ADMIN — Medication 10 ML: at 21:07

## 2023-04-08 RX ADMIN — QUETIAPINE FUMARATE 50 MG: 50 TABLET ORAL at 21:06

## 2023-04-08 RX ADMIN — Medication 10 ML: at 12:13

## 2023-04-08 RX ADMIN — ACETAMINOPHEN 650 MG: 325 TABLET ORAL at 17:20

## 2023-04-08 RX ADMIN — LORAZEPAM 1 MG: 2 INJECTION INTRAMUSCULAR; INTRAVENOUS at 00:26

## 2023-04-08 RX ADMIN — PANTOPRAZOLE SODIUM 40 MG: 40 INJECTION, POWDER, FOR SOLUTION INTRAVENOUS at 12:14

## 2023-04-08 RX ADMIN — METOPROLOL SUCCINATE 25 MG: 25 TABLET, EXTENDED RELEASE ORAL at 11:55

## 2023-04-08 RX ADMIN — KETOROLAC TROMETHAMINE 15 MG: 15 INJECTION, SOLUTION INTRAMUSCULAR; INTRAVENOUS at 14:43

## 2023-04-08 RX ADMIN — HALOPERIDOL 5 MG: 5 INJECTION INTRAMUSCULAR at 02:45

## 2023-04-08 ASSESSMENT — ENCOUNTER SYMPTOMS
CHOKING: 0
COUGH: 0
VOMITING: 0
TROUBLE SWALLOWING: 0
PHOTOPHOBIA: 0
ABDOMINAL PAIN: 0
NAUSEA: 0
SHORTNESS OF BREATH: 0
COLOR CHANGE: 0
BACK PAIN: 0

## 2023-04-08 ASSESSMENT — PAIN SCALES - GENERAL
PAINLEVEL_OUTOF10: 0
PAINLEVEL_OUTOF10: 8
PAINLEVEL_OUTOF10: 3
PAINLEVEL_OUTOF10: 8
PAINLEVEL_OUTOF10: 4

## 2023-04-08 ASSESSMENT — PAIN DESCRIPTION - LOCATION
LOCATION: HEAD
LOCATION: HEAD

## 2023-04-08 ASSESSMENT — PAIN - FUNCTIONAL ASSESSMENT: PAIN_FUNCTIONAL_ASSESSMENT: NONE - DENIES PAIN

## 2023-04-09 LAB
ALBUMIN SERPL-MCNC: 4.1 G/DL (ref 3.5–4.6)
ALP SERPL-CCNC: 57 U/L (ref 40–130)
ALT SERPL-CCNC: 15 U/L (ref 0–33)
ANION GAP SERPL CALCULATED.3IONS-SCNC: 12 MEQ/L (ref 9–15)
AST SERPL-CCNC: 40 U/L (ref 0–35)
BASOPHILS # BLD: 0 K/UL (ref 0–0.2)
BASOPHILS NFR BLD: 0.3 %
BILIRUB SERPL-MCNC: 1 MG/DL (ref 0.2–0.7)
BUN SERPL-MCNC: 14 MG/DL (ref 8–23)
CALCIUM SERPL-MCNC: 9 MG/DL (ref 8.5–9.9)
CHLORIDE SERPL-SCNC: 98 MEQ/L (ref 95–107)
CO2 SERPL-SCNC: 24 MEQ/L (ref 20–31)
CREAT SERPL-MCNC: 0.58 MG/DL (ref 0.5–0.9)
EOSINOPHIL # BLD: 0 K/UL (ref 0–0.7)
EOSINOPHIL NFR BLD: 0 %
ERYTHROCYTE [DISTWIDTH] IN BLOOD BY AUTOMATED COUNT: 12.9 % (ref 11.5–14.5)
GLOBULIN SER CALC-MCNC: 2.1 G/DL (ref 2.3–3.5)
GLUCOSE SERPL-MCNC: 103 MG/DL (ref 70–99)
HCT VFR BLD AUTO: 45.4 % (ref 37–47)
HGB BLD-MCNC: 15.3 G/DL (ref 12–16)
LYMPHOCYTES # BLD: 1.2 K/UL (ref 1–4.8)
LYMPHOCYTES NFR BLD: 13.9 %
MAGNESIUM SERPL-MCNC: 1.9 MG/DL (ref 1.7–2.4)
MCH RBC QN AUTO: 33.1 PG (ref 27–31.3)
MCHC RBC AUTO-ENTMCNC: 33.6 % (ref 33–37)
MCV RBC AUTO: 98.4 FL (ref 79.4–94.8)
MONOCYTES # BLD: 0.7 K/UL (ref 0.2–0.8)
MONOCYTES NFR BLD: 7.8 %
NEUTROPHILS # BLD: 6.9 K/UL (ref 1.4–6.5)
NEUTS SEG NFR BLD: 78 %
PLATELET # BLD AUTO: 274 K/UL (ref 130–400)
POTASSIUM SERPL-SCNC: 3.6 MEQ/L (ref 3.4–4.9)
PROT SERPL-MCNC: 6.2 G/DL (ref 6.3–8)
RBC # BLD AUTO: 4.61 M/UL (ref 4.2–5.4)
SODIUM SERPL-SCNC: 134 MEQ/L (ref 135–144)
WBC # BLD AUTO: 8.9 K/UL (ref 4.8–10.8)

## 2023-04-09 PROCEDURE — 83735 ASSAY OF MAGNESIUM: CPT

## 2023-04-09 PROCEDURE — 80053 COMPREHEN METABOLIC PANEL: CPT

## 2023-04-09 PROCEDURE — 2580000003 HC RX 258: Performed by: NURSE PRACTITIONER

## 2023-04-09 PROCEDURE — 6370000000 HC RX 637 (ALT 250 FOR IP): Performed by: INTERNAL MEDICINE

## 2023-04-09 PROCEDURE — 36415 COLL VENOUS BLD VENIPUNCTURE: CPT

## 2023-04-09 PROCEDURE — G0378 HOSPITAL OBSERVATION PER HR: HCPCS

## 2023-04-09 PROCEDURE — 85025 COMPLETE CBC W/AUTO DIFF WBC: CPT

## 2023-04-09 RX ORDER — QUETIAPINE FUMARATE 25 MG/1
25 TABLET, FILM COATED ORAL 2 TIMES DAILY
Status: DISCONTINUED | OUTPATIENT
Start: 2023-04-09 | End: 2023-04-13 | Stop reason: HOSPADM

## 2023-04-09 RX ORDER — LISINOPRIL 20 MG/1
20 TABLET ORAL DAILY
Status: DISCONTINUED | OUTPATIENT
Start: 2023-04-09 | End: 2023-04-13 | Stop reason: HOSPADM

## 2023-04-09 RX ORDER — PANTOPRAZOLE SODIUM 40 MG/1
40 TABLET, DELAYED RELEASE ORAL
Status: DISCONTINUED | OUTPATIENT
Start: 2023-04-09 | End: 2023-04-13 | Stop reason: HOSPADM

## 2023-04-09 RX ADMIN — LISINOPRIL 20 MG: 20 TABLET ORAL at 09:13

## 2023-04-09 RX ADMIN — QUETIAPINE FUMARATE 25 MG: 25 TABLET ORAL at 09:11

## 2023-04-09 RX ADMIN — Medication 10 ML: at 09:13

## 2023-04-09 RX ADMIN — PANTOPRAZOLE SODIUM 40 MG: 40 TABLET, DELAYED RELEASE ORAL at 09:11

## 2023-04-09 RX ADMIN — HYDROXYZINE HYDROCHLORIDE 25 MG: 25 TABLET, FILM COATED ORAL at 09:11

## 2023-04-09 RX ADMIN — METOPROLOL SUCCINATE 25 MG: 25 TABLET, EXTENDED RELEASE ORAL at 09:12

## 2023-04-09 RX ADMIN — Medication 10 ML: at 21:06

## 2023-04-09 RX ADMIN — QUETIAPINE FUMARATE 25 MG: 25 TABLET ORAL at 21:06

## 2023-04-09 ASSESSMENT — PAIN SCALES - GENERAL
PAINLEVEL_OUTOF10: 0
PAINLEVEL_OUTOF10: 0

## 2023-04-10 PROBLEM — F03.918 DEMENTIA WITH BEHAVIORAL DISTURBANCE (HCC): Status: ACTIVE | Noted: 2023-04-10

## 2023-04-10 PROBLEM — G31.84 MCI (MILD COGNITIVE IMPAIRMENT): Status: ACTIVE | Noted: 2023-04-10

## 2023-04-10 PROBLEM — R26.89 FRONTAL GAIT: Status: ACTIVE | Noted: 2023-04-10

## 2023-04-10 LAB
ALBUMIN SERPL-MCNC: 3.8 G/DL (ref 3.5–4.6)
ALP SERPL-CCNC: 55 U/L (ref 40–130)
ALT SERPL-CCNC: 15 U/L (ref 0–33)
ANION GAP SERPL CALCULATED.3IONS-SCNC: 10 MEQ/L (ref 9–15)
AST SERPL-CCNC: 34 U/L (ref 0–35)
BASOPHILS # BLD: 0.1 K/UL (ref 0–0.2)
BASOPHILS NFR BLD: 0.9 %
BILIRUB SERPL-MCNC: 0.6 MG/DL (ref 0.2–0.7)
BUN SERPL-MCNC: 23 MG/DL (ref 8–23)
CALCIUM SERPL-MCNC: 8.9 MG/DL (ref 8.5–9.9)
CHLORIDE SERPL-SCNC: 99 MEQ/L (ref 95–107)
CO2 SERPL-SCNC: 26 MEQ/L (ref 20–31)
CREAT SERPL-MCNC: 0.75 MG/DL (ref 0.5–0.9)
EKG ATRIAL RATE: 68 BPM
EKG P AXIS: 47 DEGREES
EKG P-R INTERVAL: 154 MS
EKG Q-T INTERVAL: 436 MS
EKG QRS DURATION: 78 MS
EKG QTC CALCULATION (BAZETT): 463 MS
EKG R AXIS: 13 DEGREES
EKG T AXIS: 37 DEGREES
EKG VENTRICULAR RATE: 68 BPM
EOSINOPHIL # BLD: 0.1 K/UL (ref 0–0.7)
EOSINOPHIL NFR BLD: 1.5 %
ERYTHROCYTE [DISTWIDTH] IN BLOOD BY AUTOMATED COUNT: 13 % (ref 11.5–14.5)
GLOBULIN SER CALC-MCNC: 2 G/DL (ref 2.3–3.5)
GLUCOSE SERPL-MCNC: 98 MG/DL (ref 70–99)
HCT VFR BLD AUTO: 44 % (ref 37–47)
HGB BLD-MCNC: 15 G/DL (ref 12–16)
LYMPHOCYTES # BLD: 2.6 K/UL (ref 1–4.8)
LYMPHOCYTES NFR BLD: 32.7 %
MAGNESIUM SERPL-MCNC: 1.9 MG/DL (ref 1.7–2.4)
MCH RBC QN AUTO: 34 PG (ref 27–31.3)
MCHC RBC AUTO-ENTMCNC: 34 % (ref 33–37)
MCV RBC AUTO: 99.9 FL (ref 79.4–94.8)
MONOCYTES # BLD: 0.9 K/UL (ref 0.2–0.8)
MONOCYTES NFR BLD: 11.6 %
NEUTROPHILS # BLD: 4.2 K/UL (ref 1.4–6.5)
NEUTS SEG NFR BLD: 53.3 %
PLATELET # BLD AUTO: 271 K/UL (ref 130–400)
POTASSIUM SERPL-SCNC: 3.9 MEQ/L (ref 3.4–4.9)
PROT SERPL-MCNC: 5.8 G/DL (ref 6.3–8)
RBC # BLD AUTO: 4.4 M/UL (ref 4.2–5.4)
SODIUM SERPL-SCNC: 135 MEQ/L (ref 135–144)
WBC # BLD AUTO: 7.8 K/UL (ref 4.8–10.8)

## 2023-04-10 PROCEDURE — 2580000003 HC RX 258: Performed by: NURSE PRACTITIONER

## 2023-04-10 PROCEDURE — 93010 ELECTROCARDIOGRAM REPORT: CPT | Performed by: INTERNAL MEDICINE

## 2023-04-10 PROCEDURE — 36415 COLL VENOUS BLD VENIPUNCTURE: CPT

## 2023-04-10 PROCEDURE — 85025 COMPLETE CBC W/AUTO DIFF WBC: CPT

## 2023-04-10 PROCEDURE — APPSS15 APP SPLIT SHARED TIME 0-15 MINUTES: Performed by: NURSE PRACTITIONER

## 2023-04-10 PROCEDURE — 97116 GAIT TRAINING THERAPY: CPT

## 2023-04-10 PROCEDURE — 6370000000 HC RX 637 (ALT 250 FOR IP): Performed by: INTERNAL MEDICINE

## 2023-04-10 PROCEDURE — 80053 COMPREHEN METABOLIC PANEL: CPT

## 2023-04-10 PROCEDURE — 6360000002 HC RX W HCPCS: Performed by: INTERNAL MEDICINE

## 2023-04-10 PROCEDURE — 83735 ASSAY OF MAGNESIUM: CPT

## 2023-04-10 PROCEDURE — G0378 HOSPITAL OBSERVATION PER HR: HCPCS

## 2023-04-10 PROCEDURE — 96372 THER/PROPH/DIAG INJ SC/IM: CPT

## 2023-04-10 RX ORDER — CALCIUM CARBONATE 200(500)MG
500 TABLET,CHEWABLE ORAL 3 TIMES DAILY PRN
Status: DISCONTINUED | OUTPATIENT
Start: 2023-04-10 | End: 2023-04-13 | Stop reason: HOSPADM

## 2023-04-10 RX ORDER — CARVEDILOL 3.12 MG/1
3.12 TABLET ORAL 2 TIMES DAILY WITH MEALS
Status: DISCONTINUED | OUTPATIENT
Start: 2023-04-10 | End: 2023-04-13 | Stop reason: HOSPADM

## 2023-04-10 RX ORDER — LISINOPRIL 20 MG/1
20 TABLET ORAL DAILY
Qty: 30 TABLET | Refills: 3 | DISCHARGE
Start: 2023-04-11

## 2023-04-10 RX ORDER — CARVEDILOL 6.25 MG/1
6.25 TABLET ORAL 2 TIMES DAILY WITH MEALS
Status: DISCONTINUED | OUTPATIENT
Start: 2023-04-10 | End: 2023-04-10

## 2023-04-10 RX ORDER — CARVEDILOL 3.12 MG/1
3.12 TABLET ORAL 2 TIMES DAILY WITH MEALS
Qty: 60 TABLET | Refills: 3 | DISCHARGE
Start: 2023-04-10

## 2023-04-10 RX ORDER — QUETIAPINE FUMARATE 25 MG/1
25 TABLET, FILM COATED ORAL 2 TIMES DAILY PRN
Qty: 60 TABLET | Refills: 3 | DISCHARGE
Start: 2023-04-10

## 2023-04-10 RX ADMIN — CARVEDILOL 3.12 MG: 3.12 TABLET, FILM COATED ORAL at 17:46

## 2023-04-10 RX ADMIN — Medication 10 ML: at 08:38

## 2023-04-10 RX ADMIN — PANTOPRAZOLE SODIUM 40 MG: 40 TABLET, DELAYED RELEASE ORAL at 08:37

## 2023-04-10 RX ADMIN — HALOPERIDOL LACTATE 2 MG: 5 INJECTION, SOLUTION INTRAMUSCULAR at 23:21

## 2023-04-10 RX ADMIN — METOPROLOL SUCCINATE 25 MG: 25 TABLET, EXTENDED RELEASE ORAL at 08:38

## 2023-04-10 RX ADMIN — LISINOPRIL 20 MG: 20 TABLET ORAL at 08:38

## 2023-04-10 RX ADMIN — QUETIAPINE FUMARATE 25 MG: 25 TABLET ORAL at 08:37

## 2023-04-10 RX ADMIN — Medication 10 ML: at 21:45

## 2023-04-10 ASSESSMENT — ENCOUNTER SYMPTOMS
TROUBLE SWALLOWING: 0
CHEST TIGHTNESS: 0
SHORTNESS OF BREATH: 0
VOMITING: 0
WHEEZING: 0
COUGH: 0
COLOR CHANGE: 0
NAUSEA: 0

## 2023-04-10 ASSESSMENT — PAIN SCALES - GENERAL
PAINLEVEL_OUTOF10: 0
PAINLEVEL_OUTOF10: 0

## 2023-04-10 NOTE — DISCHARGE INSTR - COC
Continuity of Care Form    Patient Name: Clemencia Woods   :  1935  MRN:  [id number]    Admit date:  2023  Discharge date:  ***    Code Status Order: Full Code   Advance Directives:     Admitting Physician:  Elissa Rios DO  PCP: Tereso Macdonald PA-C    Discharging Nurse: [facility] Unit/Room#: C545/Z993-48  Discharging Unit Phone Number: ***    Emergency Contact:   Extended Emergency Contact Information  Primary Emergency Contact: [address] Phone: [phone]  Mobile Phone: [phone]  Relation: Child  Preferred language: English   needed?  No  Secondary Emergency Contact: [place] [address]  Phone: [phone]  Work Phone: [phone]  Mobile Phone: [phone]  Relation: Child    Past Surgical History:  Past Surgical History:   Procedure Laterality Date    APPENDECTOMY      BACK SURGERY      COLONOSCOPY      ENDOSCOPY, COLON, DIAGNOSTIC      EYE SURGERY      OS eye glaucoma OR    FRACTURE SURGERY Left 2012    distal radial fracture/Dr. Rishi Torres    HYSTERECTOMY (CERVIX STATUS UNKNOWN)      CO PERQ VERTEBROPLASTY UNI/BI INJX CERVICOTHORACIC N/A 3/7/2018    T-11, T-12 VERTEBRAL AUGMENTATION performed by Carin Councilman, MD at [facility]       Immunization History:   Immunization History   Administered Date(s) Administered    COVID-19, [address] border, Primary or Immunocompromised, (age 12y+), IM, 100 mcg/0.5mL 2021, 2021, 2021    COVID-19, MODERNA Bivalent BOOSTER, (age 12y+), IM, 48 mcg/0.5 mL 2022    Influenza A (X4F0-05) Vaccine PF IM 2010    Influenza Vaccine, unspecified formulation 10/28/2011, 10/15/2012, 2013, 2014, 10/12/2015, 2016    Influenza Virus Vaccine 10/28/2011, 10/15/2012, 2013, 2014, 10/12/2015, 2016    Influenza, FLUAD, (age 72 y+), Adjuvanted, 0.5mL 2021, 11/10/2022    Influenza, FLUZONE (age 72 y+), High Dose, 0.7mL 2020    Influenza,

## 2023-04-10 NOTE — CARE COORDINATION
LSW spoke with patient's daughter, Jo Tobias, over the phone to follow up on SNF choice. She said they have not made a decision yet and are waiting for call backs from a couple of the SNFs. Jo Tobias also said she was on her way to visit patient so LSW will meet with her when she arrives to discuss options further. LSW met with patient's daughters to discuss SNF options. They listed [facility] as first choice and then [email or web address]. Referral faxed to [phone].

## 2023-04-10 NOTE — PLAN OF CARE
No acute events overnight. Mentation and orientation improving. Pt oriented X2-3, able to be redirected, poor short term memory. Continues to have VMT at bedside for safety. Generally impulsive when needing bathroom. Ambulating heavy assist X1 with walker. Generalized weakness. Adequate oxygen saturation on RA. Lungs clear to ascultation. Non-monitored. Voiding. No signs of bleeding or emesis. Plan: SNF placement, family provided list 4/8 per care management note. Monitor H/H, PPI BID. Problem: Discharge Planning  Goal: Discharge to home or other facility with appropriate resources  Outcome: Progressing  Flowsheets  Taken 4/10/2023 0429  Discharge to home or other facility with appropriate resources:   Identify barriers to discharge with patient and caregiver   Arrange for needed discharge resources and transportation as appropriate  Taken 4/9/2023 2058  Discharge to home or other facility with appropriate resources: Identify barriers to discharge with patient and caregiver     Problem: Risk for Elopement  Goal: Patient will not exit the unit/facility without proper excort  Outcome: Progressing  Flowsheets (Taken 4/10/2023 0429)  Nursing Interventions for Elopement Risk:   Assist with personal care needs such as toileting, eating, dressing, as needed to reduce the risk of wandering   Collaborate with family members/caregivers to mitigate the elopement risk   Collaborate with treatment team for drug withdrawal symptoms treatment   Communicate/escalate to charge nurse the risk of elopement   Reduce environmental triggers     Problem: Skin/Tissue Integrity  Goal: Absence of new skin breakdown  Description: 1. Monitor for areas of redness and/or skin breakdown  2. Assess vascular access sites hourly  3. Every 4-6 hours minimum:  Change oxygen saturation probe site  4.   Every 4-6 hours:  If on nasal continuous positive airway pressure, respiratory therapy assess nares and determine need for appliance change or

## 2023-04-11 PROCEDURE — 6370000000 HC RX 637 (ALT 250 FOR IP): Performed by: INTERNAL MEDICINE

## 2023-04-11 PROCEDURE — 97535 SELF CARE MNGMENT TRAINING: CPT

## 2023-04-11 PROCEDURE — 6370000000 HC RX 637 (ALT 250 FOR IP): Performed by: NURSE PRACTITIONER

## 2023-04-11 PROCEDURE — 2580000003 HC RX 258: Performed by: NURSE PRACTITIONER

## 2023-04-11 PROCEDURE — G0378 HOSPITAL OBSERVATION PER HR: HCPCS

## 2023-04-11 PROCEDURE — 97116 GAIT TRAINING THERAPY: CPT

## 2023-04-11 RX ADMIN — ACETAMINOPHEN 650 MG: 325 TABLET ORAL at 16:57

## 2023-04-11 RX ADMIN — Medication 10 ML: at 19:34

## 2023-04-11 RX ADMIN — CARVEDILOL 3.12 MG: 3.12 TABLET, FILM COATED ORAL at 08:45

## 2023-04-11 RX ADMIN — CARVEDILOL 3.12 MG: 3.12 TABLET, FILM COATED ORAL at 16:57

## 2023-04-11 RX ADMIN — Medication 10 ML: at 11:44

## 2023-04-11 RX ADMIN — QUETIAPINE FUMARATE 25 MG: 25 TABLET ORAL at 08:46

## 2023-04-11 RX ADMIN — PANTOPRAZOLE SODIUM 40 MG: 40 TABLET, DELAYED RELEASE ORAL at 06:07

## 2023-04-11 RX ADMIN — HYDROXYZINE HYDROCHLORIDE 25 MG: 25 TABLET, FILM COATED ORAL at 19:32

## 2023-04-11 RX ADMIN — QUETIAPINE FUMARATE 25 MG: 25 TABLET ORAL at 19:33

## 2023-04-11 ASSESSMENT — PAIN DESCRIPTION - ORIENTATION: ORIENTATION: UPPER

## 2023-04-11 ASSESSMENT — PAIN SCALES - GENERAL: PAINLEVEL_OUTOF10: 4

## 2023-04-11 ASSESSMENT — PAIN DESCRIPTION - LOCATION: LOCATION: HEAD

## 2023-04-11 ASSESSMENT — PAIN DESCRIPTION - DESCRIPTORS: DESCRIPTORS: ACHING

## 2023-04-11 NOTE — CARE COORDINATION
MESSAGE FROM LSW TO INITIATE PRE-CERT ON [facility] PORTAL. PRECERT WAS INITIATED AND WILL FOLLOW FOR DETERMINATION.

## 2023-04-11 NOTE — CARE COORDINATION
LSW spoke with Dav Nash at [address] She stated they will not have a bed available until the end of the week. LSW spoke with patient's daughters over the phone to inform them of this. LSW discussed the possibility of patient returning home at discharge. Daughters said they are unable to stay with her and would not be comfortable with her being alone due to her confusion. Next SNF choice was [facility]. Referral sent to Deondre Ramírez. LSW notified by Deondre Ramírez that [facility] is able to accept. Pre cert will be started today.

## 2023-04-11 NOTE — CARE COORDINATION
[facility] AT [place]          [address] [address]  [phone]       Patient: Sudhir Hoyt  MRN: [id number]  YEB:      Comments  Comment          Patient Information    Patient Name   Mary Witt ([id number]) Legal Sex   Female    1935   Room Bed   W176 W176-01       Patient Demographics    Address   [place] [address] Phone   [phone] [facility])   [phone] (Mobile) *Preferred* E-mail Address   [email or web address]       Social Security Number    SSN          Basic Information    Date Of Birth   1935 Legal Sex   Female Race    / [employer]    /  Preferred Language   English Preferred Written Language   English       Admission Information      Current Information    Attending Provider Admitting Provider Admission Type Admission Status   Radha Vivar 38., DO Emergency Confirmed Admission          Admission Date/Time Discharge Date Hospital Service Auth/Cert Status   63/69/10  08:26 PM  [address] [facility] Unit Room/Bed    [address] J806/J733-93                  Admission    Complaint          Payor Information             PRIMARY INSURANCE   Payor: [facility] MEDICARE Plan: Phyllis MCKEON*   Group Number: [address] Type: INDEMNITY   Subscriber Name: Suma Calabrese : 1935   Subscriber ID: [id number]       Samir Thrasher. Rel. to Subscriber: Self       SECONDARY INSURANCE   Payor:   Plan:     Group Number:   Insurance Type:     Subscriber Name:   Subscriber :     Subscriber ID:         Pat.  Rel. to Subscriber:                  PCP and Center    Primary Care Provider   Jose Familia, [place] Phone   [facility]         Patient Contacts    Name Relation Home Work [place] Child [phone]  [facility] [phone]

## 2023-04-11 NOTE — DISCHARGE SUMMARY
[facility] Medicine Discharge Summary    Doc Sees  :  1935  MRN:  [id number]    Admit date:  2023    Discharge date:  2023    Admitting Physician:  Viktoriya Olsen DO  Primary Care Physician:  Rama Chávez PA-C    Discharge Diagnoses:    Principal Problem:    Dementia with behavioral disturbance [facility])  Active Problems:    AMS (altered mental status)    Coffee ground emesis    MCI (mild cognitive impairment)    Frontal gait  Resolved Problems:    * No resolved hospital problems. *    Chief Complaint   Patient presents with    Altered Mental Status     Patient having change in mental status. Per family, patient was A&Ox4 yesterday. Today patient alert to self. Condition: improved   Activity: no restrictions  Diet: regular  Disposition: SNF  Functional Status: ambulatory    Significant Findings:     Recent Labs     04/10/23  0539 23  0530 23  0512   WBC 7.8 8.9 9.8   HGB 15.0 15.3 15.2   HCT 44.0 45.4 45.0   MCV 99.9* 98.4* 97.8*    274 271     Labs Renal Latest Ref Rng & Units 4/10/2023 2023 2023 2023 2023   BUN 8 - 23 mg/dL 23 14 17 18 12   Cr 0.50 - 0.90 mg/dL 0.75 0.58 0.62 0.74 0.68   K 3.4 - 4.9 mEq/L 3.9 3.6 3.5 4.4 4.8   Na 135 - 144 mEq/L 135 134(L) 134(L) 134(L) 139     CT head:  1. There is no acute intracranial abnormality. Specifically, there is no   intracranial hemorrhage. 2. Atrophy and periventricular leukomalacia,       Hospital Course:   82-year-old female was admitted for dementia with behavioral disturbance. She had been increasingly confused and agitated. No reversible etiology was found. Neurology felt this was age-related cognitive decline with mild cognitive impairment. She was treated with low-dose Seroquel during the hospitalization and remained calm on the final 2 days of her hospitalization. She will continue her care at skilled nursing facility.     She was noted to be hypertensive

## 2023-04-12 PROCEDURE — 97116 GAIT TRAINING THERAPY: CPT

## 2023-04-12 PROCEDURE — 97535 SELF CARE MNGMENT TRAINING: CPT

## 2023-04-12 PROCEDURE — G0378 HOSPITAL OBSERVATION PER HR: HCPCS

## 2023-04-12 PROCEDURE — 2580000003 HC RX 258: Performed by: NURSE PRACTITIONER

## 2023-04-12 PROCEDURE — 6370000000 HC RX 637 (ALT 250 FOR IP): Performed by: NURSE PRACTITIONER

## 2023-04-12 PROCEDURE — 96372 THER/PROPH/DIAG INJ SC/IM: CPT

## 2023-04-12 PROCEDURE — 6370000000 HC RX 637 (ALT 250 FOR IP): Performed by: INTERNAL MEDICINE

## 2023-04-12 PROCEDURE — 6360000002 HC RX W HCPCS: Performed by: INTERNAL MEDICINE

## 2023-04-12 RX ADMIN — Medication 10 ML: at 20:40

## 2023-04-12 RX ADMIN — CARVEDILOL 3.12 MG: 3.12 TABLET, FILM COATED ORAL at 09:04

## 2023-04-12 RX ADMIN — ACETAMINOPHEN 650 MG: 325 TABLET ORAL at 15:19

## 2023-04-12 RX ADMIN — CARVEDILOL 3.12 MG: 3.12 TABLET, FILM COATED ORAL at 16:24

## 2023-04-12 RX ADMIN — HALOPERIDOL LACTATE 2 MG: 5 INJECTION, SOLUTION INTRAMUSCULAR at 22:00

## 2023-04-12 RX ADMIN — QUETIAPINE FUMARATE 25 MG: 25 TABLET ORAL at 20:38

## 2023-04-12 RX ADMIN — Medication 10 ML: at 09:08

## 2023-04-12 RX ADMIN — PANTOPRAZOLE SODIUM 40 MG: 40 TABLET, DELAYED RELEASE ORAL at 06:35

## 2023-04-12 RX ADMIN — QUETIAPINE FUMARATE 25 MG: 25 TABLET ORAL at 09:04

## 2023-04-12 ASSESSMENT — PAIN SCALES - WONG BAKER
WONGBAKER_NUMERICALRESPONSE: 0

## 2023-04-12 ASSESSMENT — PAIN DESCRIPTION - ORIENTATION: ORIENTATION: RIGHT

## 2023-04-12 ASSESSMENT — PAIN SCALES - GENERAL
PAINLEVEL_OUTOF10: 0
PAINLEVEL_OUTOF10: 0
PAINLEVEL_OUTOF10: 4

## 2023-04-12 ASSESSMENT — PAIN DESCRIPTION - LOCATION: LOCATION: HAND

## 2023-04-12 NOTE — CARE COORDINATION
Call from [facility] and will need updated progress notes, med list and ot eval. Page to OT and aware of priority eval need. Med list and progress note submitted on portal. Will send OT once completed.

## 2023-04-12 NOTE — CARE COORDINATION
[place] [address]  [phone]       Patient: Doc Sees  MRN: [id number]  MWK:[id number]      Scout Mendez #[id number] (GEY:[id number]) (82 y.o.  F) (Adm: 04/07/23)  [id number]  Current Scheduled Meds  Expand  Hide  (From admission, onward)  Start   Ordered Stop   04/11/23 0000  lisinopril (PRINIVIL;ZESTRIL) 20 MG tablet  20 mg,   Oral,   DAILY         04/10/23 0929 --   04/10/23 1700  carvedilol (COREG) tablet 3.125 mg  3.125 mg,   Oral,   2 TIMES DAILY WITH MEALS         04/10/23 0928 --   04/10/23 0000  carvedilol (COREG) 3.125 MG tablet  3.125 mg,   Oral,   2 TIMES DAILY WITH MEALS         04/10/23 0929 --   04/09/23 0900  lisinopril (PRINIVIL;ZESTRIL) tablet 20 mg  20 mg,   Oral,   DAILY         04/09/23 0703 --   04/09/23 0900  QUEtiapine (SEROQUEL) tablet 25 mg  25 mg,   Oral,   2 TIMES DAILY         04/09/23 0808 --   04/09/23 0830  pantoprazole (PROTONIX) tablet 40 mg  40 mg,   Oral,   DAILY BEFORE BREAKFAST         04/09/23 0808 --   04/08/23 2100  bimatoprost (LUMIGAN) 0.01 % ophthalmic drops 1 drop (Patient Supplied)  1 drop,   Both Eyes,   NIGHTLY         04/08/23 1456 --   04/08/23 0900  sodium chloride flush 0.9 % injection 5-40 mL  5-40 mL,   IntraVENous,   2 times per day         04/08/23 0229 --   04/08/23 0900  [Held by provider]  enoxaparin (LOVENOX) injection 40 mg  40 mg,   SubCUTAneous,   DAILY         04/08/23 0229 --        Current Continuous Meds  (From admission, onward)  None     Current PRN Meds  Expand  Hide  (From admission, onward)  Start   Ordered Stop   04/10/23 1328  calcium carbonate (TUMS) chewable tablet 500 mg  500 mg,   Oral,   3 TIMES DAILY PRN         04/10/23 1328 --   04/10/23 0000  QUEtiapine (SEROQUEL) 25 MG tablet  25 mg,   Oral,   2 TIMES DAILY PRN         04/10/23 0929 --   04/08/23 0820  haloperidol lactate (HALDOL) injection 2 mg  2 mg,   IntraMUSCular,   EVERY 4 HOURS PRN         04/08/23 8241 --

## 2023-04-13 VITALS
WEIGHT: 133.4 LBS | HEIGHT: 63 IN | RESPIRATION RATE: 18 BRPM | DIASTOLIC BLOOD PRESSURE: 81 MMHG | BODY MASS INDEX: 23.64 KG/M2 | OXYGEN SATURATION: 95 % | HEART RATE: 98 BPM | SYSTOLIC BLOOD PRESSURE: 168 MMHG | TEMPERATURE: 98.3 F

## 2023-04-13 PROCEDURE — G0378 HOSPITAL OBSERVATION PER HR: HCPCS

## 2023-04-13 PROCEDURE — 6370000000 HC RX 637 (ALT 250 FOR IP): Performed by: INTERNAL MEDICINE

## 2023-04-13 PROCEDURE — 2580000003 HC RX 258: Performed by: NURSE PRACTITIONER

## 2023-04-13 PROCEDURE — 97116 GAIT TRAINING THERAPY: CPT

## 2023-04-13 RX ADMIN — LISINOPRIL 20 MG: 20 TABLET ORAL at 08:04

## 2023-04-13 RX ADMIN — CARVEDILOL 3.12 MG: 3.12 TABLET, FILM COATED ORAL at 08:04

## 2023-04-13 RX ADMIN — Medication 10 ML: at 09:00

## 2023-04-13 RX ADMIN — PANTOPRAZOLE SODIUM 40 MG: 40 TABLET, DELAYED RELEASE ORAL at 05:59

## 2023-04-13 RX ADMIN — QUETIAPINE FUMARATE 25 MG: 25 TABLET ORAL at 08:04

## 2023-04-13 NOTE — CARE COORDINATION
[facility] scheduled for today at 12:00. Nurse and facility informed. VM left for patient's daughter.

## 2023-04-14 ENCOUNTER — OFFICE VISIT (OUTPATIENT)
Dept: GERIATRIC MEDICINE | Age: 88
End: 2023-04-14
Payer: MEDICARE

## 2023-04-14 DIAGNOSIS — S62.654A NONDISPLACED FRACTURE OF MIDDLE PHALANX OF RIGHT RING FINGER, INITIAL ENCOUNTER FOR CLOSED FRACTURE: Primary | ICD-10-CM

## 2023-04-14 PROCEDURE — 1123F ACP DISCUSS/DSCN MKR DOCD: CPT | Performed by: PHYSICIAN ASSISTANT

## 2023-04-14 PROCEDURE — 99308 SBSQ NF CARE LOW MDM 20: CPT | Performed by: PHYSICIAN ASSISTANT

## 2023-04-14 NOTE — PROGRESS NOTES
[facility] MED SURG TREATMENT NOTE     Date: 2023  Patient Name: Sourav Haq        MRN: [id number]  Account: [de-identified]   : 1935  (82 y.o.)  Room: [place] [address]    Chart Review:    Restrictions  Restrictions/Precautions  Restrictions/Precautions: Fall Risk, Modified Diet     Safety:  Safety Devices  Type of Devices: All fall risk precautions in place; Bed alarm in place;Call light within reach; Left in bed    Patient's birthday verified: Yes    Subjective:     \"I don't know how it happened but its broke (R 4th digit) and my thumb hurts too. I can't use this hand. \"       Pain at start of treatment: Yes: Pt. unable to rate pain-      Pain at end of treatment: Yes: Pt. unable to rate pain-      Location: R hand  Description ache  Nursing notified: No - Pt's daughter stating she was just medicated for pain by RN. RN: Suki Barry    Objective:    ADL Status:  ADL  Grooming: Minimal assistance  LE Dressing: Dependent/Total  Additional Comments: ADL tasks completed as above. splint and wrap to R hand limiting functional use of dom hand. Pt sat EOB to complete ADL tasks at the above level. Pt required increased time to complete all tasks due to 1 handed tech with non dom hand to complete. NWB maintained through R hand per clinical judgement. Pt demonstrated decreased overall functional activity tolerance and would benefit from additional edu of ADL compensatory strategies with use of non dom hand to complete functional tasks.      Therapy key for assistance levels -   Independent/Mod I = Pt. is able to perform task with no assistance but may require a device   Stand by assistance = Pt. does not perform task at an independent level but does not need physical assistance, requires verbal cues  Minimal, Moderate, Maximal Assistance = Pt. requires physical assistance (25%, 50%, 75% assist from helper) for task but is able to actively participate in task   Dependent = Pt. requires total
[facility] Neurology Daily Progress Note  Name: Daryl Dillon  Age: 82 y.o. Gender: female  CodeStatus: Full Code  Allergies: Hydromorphone  Dilaudid [Fd&C Blue #1 [place]-Hydromorphone]    Chief Complaint:Altered Mental Status (Patient having change in mental status. Per family, patient was A&Ox4 yesterday. Today patient alert to self.)    Primary Care Provider: Alan Jimenez PA-C  InpatientTreatment Team: Treatment Team: Attending Provider: Reena Bojorquez DO; Consulting Physician: Zayda Hopkins MD; Patient Care Tech: [facility], OTR/L; : Estelita Holm RN; Registered Nurse: Sanket Najera. Laura Ann; Patient Care Tech: [facility]; Utilization Reviewer: Tristian Bowens RN  Admission Date: 4/7/2023      HPI   Pt seen and examined for neuro follow up. She is currently alert and oriented x2, no acute distress, cooperative. No current behavioral issues. Nonfocal.  No seizures reported. Afebrile. Laboratory testing is reviewed. NO Headache, double vision, blurry vision, difficulty with speech, difficulty with swallowing, weakness, numbness,  nausea, vomiting, choking, neck pain, dizziness    Patient seen and examined and appears to be nonfocal  Vitals:    04/10/23 1102   BP: 138/63   Pulse: 81   Resp: 18   Temp: 97.6 °F (36.4 °C)   SpO2: 98%        Review of Systems   Constitutional:  Negative for appetite change and fever. HENT:  Negative for hearing loss and trouble swallowing. Eyes:  Negative for visual disturbance. Respiratory:  Negative for cough, chest tightness, shortness of breath and wheezing. Cardiovascular:  Negative for chest pain, palpitations and leg swelling. Gastrointestinal:  Negative for nausea and vomiting. Musculoskeletal:  Positive for gait problem. Skin:  Negative for color change and rash.    Neurological:  Negative for dizziness, tremors, seizures, syncope, facial asymmetry, speech difficulty, weakness, light-headedness, numbness and
Physical Therapy  Facility/Department: [facility] MED SURG V502/R098-61  Physical Therapy Discharge      NAME: Deja Rubin    : 1935 (82 y.o.)  MRN: [id number]    Account: [de-identified]  Gender: female      Patient has been discharged from acute care hospital. DC patient from current PT program.      Electronically signed by Rachelle Leon PT on 23 at 12:07 PM EDT
Physical Therapy Med Surg Daily Treatment Note  Facility/Department: [facility]  Room: [facility] [id number]       NAME: Rickie Vallejo  : 1935 (82 y.o.)  MRN: [id number]  CODE STATUS: Full Code    Date of Service: 4/10/2023    Patient Diagnosis(es): Hypertensive urgency [I16.0]  Altered mental status, unspecified altered mental status type [R41.82]  AMS (altered mental status) [R41.82]   Chief Complaint   Patient presents with    Altered Mental Status     Patient having change in mental status. Per family, patient was A&Ox4 yesterday. Today patient alert to self.      Patient Active Problem List    Diagnosis Date Noted    Right foot infection 2022    Urinary tract infection without hematuria 2022    Dementia with behavioral disturbance ([facility] [address] 04/10/2023    Coffee ground emesis 2023    AMS (altered mental status) 2023    Senile osteoporosis     Atrial fibrillation with RVR ([facility] [address] 2015    Spinal stenosis of lumbar region with radiculopathy 2015    Gait disturbance 2012    Lumbar radicular pain 2012    Depression with anxiety     Hypertension     GERD (gastroesophageal reflux disease)     Elevated cholesterol         Past Medical History:   Diagnosis Date    Anxiety     Arthritis     Chronic back pain     DEGENERATIVE BACK    Depression     Distal radial fracture     GERD (gastroesophageal reflux disease)     Glaucoma     History of mammography, screening  CAT 2 BILAT    History of osteopenia     Hypertension     meds > 8 yrs / denies TIA or stroke    Lumbar radicular pain     Polycythemia vera(238.4)     Rib fracture 2018    Senile osteoporosis     Urinary tract infection without hematuria 2022     Past Surgical History:   Procedure Laterality Date    APPENDECTOMY      BACK SURGERY      COLONOSCOPY      ENDOSCOPY, COLON, DIAGNOSTIC      EYE SURGERY      OS eye glaucoma OR    FRACTURE SURGERY Left 2012    distal radial fracture/Dr. Tavia Shah
Physical Therapy Med Surg Daily Treatment Note  Facility/Department: [facility]  Room: [facility]/A228-33       NAME: Aaron Henning  : 1935 (82 y.o.)  MRN: [id number]  CODE STATUS: Full Code    Date of Service: 2023    Patient Diagnosis(es): Hypertensive urgency [I16.0]  Altered mental status, unspecified altered mental status type [R41.82]  AMS (altered mental status) [R41.82]   Chief Complaint   Patient presents with    Altered Mental Status     Patient having change in mental status. Per family, patient was A&Ox4 yesterday. Today patient alert to self.      Patient Active Problem List    Diagnosis Date Noted    Right foot infection 2022    Urinary tract infection without hematuria 2022    Dementia with behavioral disturbance ([facility] [address] 04/10/2023    MCI (mild cognitive impairment) 04/10/2023    Frontal gait 04/10/2023    Coffee ground emesis 2023    AMS (altered mental status) 2023    Senile osteoporosis     Atrial fibrillation with RVR ([facility] [address] 2015    Spinal stenosis of lumbar region with radiculopathy 2015    Gait disturbance 2012    Lumbar radicular pain 2012    Depression with anxiety     Hypertension     GERD (gastroesophageal reflux disease)     Elevated cholesterol         Past Medical History:   Diagnosis Date    Anxiety     Arthritis     Chronic back pain     DEGENERATIVE BACK    Depression     Distal radial fracture     GERD (gastroesophageal reflux disease)     Glaucoma     History of mammography, screening  CAT 2 BILAT    History of osteopenia     Hypertension     meds > 8 yrs / denies TIA or stroke    Lumbar radicular pain     Polycythemia vera(238.4)     Rib fracture 2018    Senile osteoporosis     Urinary tract infection without hematuria 2022     Past Surgical History:   Procedure Laterality Date    APPENDECTOMY      BACK SURGERY      COLONOSCOPY      ENDOSCOPY, COLON, DIAGNOSTIC      EYE SURGERY      OS eye glaucoma OR
Physician Progress Note    4/10/2023   9:30 AM    Name:  Anna Epstein  MRN:    [id number]      Day: 0     Admit Date: 4/7/2023  8:26 PM  PCP: Shay Corbett PA-C    Code Status:  Full Code    Subjective:     No complaints at this time. Discussed with nursing-patient has calmed down significantly over the last 24 hours.     Current Facility-Administered Medications   Medication Dose Route Frequency Provider Last Rate Last Admin    carvedilol (COREG) tablet 3.125 mg  3.125 mg Oral BID WC Nakita Bull, DO        lisinopril (PRINIVIL;ZESTRIL) tablet 20 mg  20 mg Oral Daily Nakita Bull, DO   20 mg at 04/10/23 4278    QUEtiapine (SEROQUEL) tablet 25 mg  25 mg Oral BID Nakita Bull, DO   25 mg at 04/10/23 0837    pantoprazole (PROTONIX) tablet 40 mg  40 mg Oral QAM AC Nakita Bull, DO   40 mg at 04/10/23 0628    hydrALAZINE (APRESOLINE) injection 10 mg  10 mg IntraVENous Q6H PRN Nicoletto Bolzan-Roche, APRN - CNP        labetalol (NORMODYNE;TRANDATE) injection 10 mg  10 mg IntraVENous Q4H PRN Nicoletto Bolzan-Roche, APRN - CNP        sodium chloride flush 0.9 % injection 5-40 mL  5-40 mL IntraVENous 2 times per day Nicoletto Bolzan-Roche, APRN - CNP   10 mL at 04/10/23 0838    sodium chloride flush 0.9 % injection 5-40 mL  5-40 mL IntraVENous PRN Nicoletto Bolzan-Roche, APRN - CNP        0.9 % sodium chloride infusion   IntraVENous PRN Nicoletto Bolzan-Roche, APRN - CNP        [Held by provider] enoxaparin (LOVENOX) injection 40 mg  40 mg SubCUTAneous Daily Nicoletto Bolzan-Roche, APRN - CNP   40 mg at 04/08/23 1155    ondansetron (ZOFRAN-ODT) disintegrating tablet 4 mg  4 mg Oral Q8H PRN Nicoletto Bolzan-Roche, APRN - CNP        Or    ondansetron (ZOFRAN) injection 4 mg  4 mg IntraVENous Q6H PRN Nicoletto Bolzan-Roche, APRN - CNP        polyethylene glycol (GLYCOLAX) packet 17 g  17 g Oral Daily PRN JULISA Shepard - JAZMIN        acetaminophen (TYLENOL) tablet 650 mg  650 mg Oral Q6H
Physician Progress Note    4/12/2023   9:29 AM    Name:  Gus Hansen  MRN:    [id number]      Day: 0     Admit Date: 4/7/2023  8:26 PM  PCP: Morenita Pope PA-C    Code Status:  Full Code    Subjective:     No complaints at this time. Discussed with nursing-patient has calmed down significantly over the last 24 hours.     Current Facility-Administered Medications   Medication Dose Route Frequency Provider Last Rate Last Admin    carvedilol (COREG) tablet 3.125 mg  3.125 mg Oral BID WC Alpha Wolbach, DO   3.125 mg at 04/12/23 0904    calcium carbonate (TUMS) chewable tablet 500 mg  500 mg Oral TID PRN Alpha Wolbach, DO        lisinopril (PRINIVIL;ZESTRIL) tablet 20 mg  20 mg Oral Daily Alpha Wolbach, DO   20 mg at 04/10/23 1877    QUEtiapine (SEROQUEL) tablet 25 mg  25 mg Oral BID Alpha Wolbach, DO   25 mg at 04/12/23 0904    pantoprazole (PROTONIX) tablet 40 mg  40 mg Oral QAM AC Alpha Wolbach, DO   40 mg at 04/12/23 9244    hydrALAZINE (APRESOLINE) injection 10 mg  10 mg IntraVENous Q6H PRN Nicoletto Bolzan-Roche, APRN - CNP        labetalol (NORMODYNE;TRANDATE) injection 10 mg  10 mg IntraVENous Q4H PRN Nicoletto Bolzan-Roche, APRN - CNP        sodium chloride flush 0.9 % injection 5-40 mL  5-40 mL IntraVENous 2 times per day Nicoletto Bolzan-Roche, APRN - CNP   10 mL at 04/12/23 0908    sodium chloride flush 0.9 % injection 5-40 mL  5-40 mL IntraVENous PRN Nicoletto Bolzan-Roche, APRN - CNP        0.9 % sodium chloride infusion   IntraVENous PRN Nicoletto Bolzan-Roche, APRN - CNP        [Held by provider] enoxaparin (LOVENOX) injection 40 mg  40 mg SubCUTAneous Daily Nicoletto Bolzan-Roche, APRN - CNP   40 mg at 04/08/23 1155    ondansetron (ZOFRAN-ODT) disintegrating tablet 4 mg  4 mg Oral Q8H PRN Nicoletto Bolzan-Roche, APRN - CNP        Or    ondansetron (ZOFRAN) injection 4 mg  4 mg IntraVENous Q6H PRN JULISA Shepard CNP        polyethylene glycol (GLYCOLAX) packet 17 g
Physician Progress Note    4/13/2023   1:18 PM    Name:  William Reyes  MRN:    [id number]      Day: 0     Admit Date: 4/7/2023  8:26 PM  PCP: Rama Chávez PA-C    Code Status:  Full Code    Subjective:     No complaints at this time.     Current Facility-Administered Medications   Medication Dose Route Frequency Provider Last Rate Last Admin    carvedilol (COREG) tablet 3.125 mg  3.125 mg Oral BID WC Nadir Clark, DO   3.125 mg at 04/13/23 0804    calcium carbonate (TUMS) chewable tablet 500 mg  500 mg Oral TID PRN Nadir Clark, DO        lisinopril (PRINIVIL;ZESTRIL) tablet 20 mg  20 mg Oral Daily Nadir Clark, DO   20 mg at 04/13/23 0804    QUEtiapine (SEROQUEL) tablet 25 mg  25 mg Oral BID Nadir Clark, DO   25 mg at 04/13/23 0804    pantoprazole (PROTONIX) tablet 40 mg  40 mg Oral QAM AC Eddie R Miguel Angel, DO   40 mg at 04/13/23 0559    hydrALAZINE (APRESOLINE) injection 10 mg  10 mg IntraVENous Q6H PRN Nicoletto Bolzan-Roche, APRN - CNP        labetalol (NORMODYNE;TRANDATE) injection 10 mg  10 mg IntraVENous Q4H PRN Nicoletto Bolzan-Roche, APRN - CNP        sodium chloride flush 0.9 % injection 5-40 mL  5-40 mL IntraVENous 2 times per day Nicoletto Bolzan-Roche, APRN - CNP   10 mL at 04/13/23 0900    sodium chloride flush 0.9 % injection 5-40 mL  5-40 mL IntraVENous PRN Nicoletto Bolzan-Roche, APRN - CNP        0.9 % sodium chloride infusion   IntraVENous PRN Nicoletto Bolzan-Roche, APRN - CNP        [Held by provider] enoxaparin (LOVENOX) injection 40 mg  40 mg SubCUTAneous Daily Nicoletto Bolzan-Roche, APRN - CNP   40 mg at 04/08/23 1155    ondansetron (ZOFRAN-ODT) disintegrating tablet 4 mg  4 mg Oral Q8H PRN Nicoletto Bolzan-Roche, APRN - CNP        Or    ondansetron (ZOFRAN) injection 4 mg  4 mg IntraVENous Q6H PRN Vladislav Gooden-Roche, APRN - CNP        polyethylene glycol (GLYCOLAX) packet 17 g  17 g Oral Daily PRN Vladislav Gooden-Roche, APRN - CNP        acetaminophen (TYLENOL)
Removed IV , tip intact. No redness or swelling at site, DCD applied. Report called to [facility]. Patient left unit with EMT with no incident.
FRACTURE SURGERY Left 05/06/2012    distal radial fracture/Dr. Dre Boles    HYSTERECTOMY (CERVIX STATUS UNKNOWN)      [place] PERQ VERTEBROPLASTY UNI/BI INJX CERVICOTHORACIC N/A 3/7/2018    T-11, T-12 VERTEBRAL AUGMENTATION performed by Taunya Fothergill, MD at [facility]            Restrictions:  Restrictions/Precautions: Fall Risk;Modified Diet (\"easy to chew\")    SUBJECTIVE:   Subjective: Here eat this muffin with some coffee. Pain  Pain: denies pain pre and post     OBJECTIVE:        Bed mobility  Rolling to Right: Supervision  Supine to Sit: Stand by assistance  Sit to Supine: Stand by assistance  Scooting: Supervision  Bed Mobility Comments: HOB elevated. Patient utilizes bed rail. Transfers  Sit to Stand: Stand by assistance  Stand to Sit: Stand by assistance    Ambulation  Surface: Level tile  Device: Rolling Walker  Assistance: Contact guard assistance  Quality of Gait: kyphotic posture, no LOB  Gait Deviations: Slow Urszula;Decreased step length;Decreased step height  Distance: 120 feet x2, 50 x 2  Ambulation 2  Surface - 2: level tile  Device 2: No device  Assistance 2: Contact guard assistance  Quality of Gait 2: Furniture surfing in room  Gait Deviations: Slow Urszula;Decreased step length;Decreased step height  Distance: 10 x 2                              Activity Tolerance  Activity Tolerance: Patient tolerated treatment well;Patient limited by fatigue          ASSESSMENT   Body Structures, Functions, Activity Limitations Requiring Skilled Therapeutic Intervention: Decreased functional mobility ; Decreased ADL status; Decreased strength;Decreased cognition;Decreased endurance;Decreased balance;Decreased high-level IADLs  Assessment: Pt is making progress towards goals decreasing need for clinician assistance with transfers and increasing ambulatory distance this date. Pt demonstrated increased difficulty with room walker this date due to it being to tall for her.  Trialed ambulation with therapy walker at
FRACTURE SURGERY Left 05/06/2012    distal radial fracture/Dr. Jesus Lott    HYSTERECTOMY (CERVIX STATUS UNKNOWN)      [place] PERQ VERTEBROPLASTY UNI/BI INJX CERVICOTHORACIC N/A 3/7/2018    T-11, T-12 VERTEBRAL AUGMENTATION [facility] Negro Key [facility] [place]       Chart Reviewed: Yes  Family / Caregiver Present: No    Restrictions:  Restrictions/Precautions: Fall Risk;Modified Diet (\"easy to chew\")    SUBJECTIVE:   Subjective: Patient resting in bed. Agreeable to tx. Pain   Denies pain. OBJECTIVE:     Bed mobility  Rolling to Right: Supervision  Supine to Sit: Stand by assistance  Bed Mobility Comments: HOB elevated. Patient utilizes bed rail. Transfers  Sit to Stand: Contact guard assistance  Stand to Sit: Contact guard assistance  Comment: with ww. Patient obtains upright stance with first attempt. Ambulation  Surface: Level tile  Device: Rolling Walker  Assistance: Contact guard assistance  Quality of Gait: kyphotic posture, inconsistent step length, shuffling steps, significant decrease in gait speed- worsens with fatigue, no LOB  Gait Deviations: Slow Urszula;Decreased step length;Decreased step height  Distance: 40 feet x2    PT Exercises  Exercise Treatment: STS x5 from recliner, seated: AP, LAQ, march x10 ea     ASSESSMENT   Assessment: Patient continues to require increased time and effort to complete functional mobility. Demonstrates shuffling gait with inconsistent pattern. Gait deviations and balance worsen upon fatigue. Patient does not complete turn to sit in chair and nearly misses seat. Patient would benefit from further therapy to decrease falls risk and safely return home at independent level.      Discharge Recommendations:  Continue to assess pending progress         Goals  Long Term Goals  Time Frame for Long Term Goals : LOS  Long Term Goal 1: Pt will be independent with bed mobility  Long Term Goal 2: Pt will be MI with functional transfers  Long Term Goal 3: Pt will be ambulate 50ft
16

## 2023-04-18 ENCOUNTER — OFFICE VISIT (OUTPATIENT)
Dept: GERIATRIC MEDICINE | Age: 88
End: 2023-04-18
Payer: MEDICARE

## 2023-04-18 DIAGNOSIS — R53.1 GENERALIZED WEAKNESS FOLLOWING POLIOMYELITIS: ICD-10-CM

## 2023-04-18 DIAGNOSIS — Z91.81 AT HIGH RISK FOR FALLS: Primary | ICD-10-CM

## 2023-04-18 DIAGNOSIS — F03.918 DEMENTIA WITH BEHAVIORAL DISTURBANCE (HCC): ICD-10-CM

## 2023-04-18 DIAGNOSIS — G31.84 MCI (MILD COGNITIVE IMPAIRMENT): ICD-10-CM

## 2023-04-18 DIAGNOSIS — B91 GENERALIZED WEAKNESS FOLLOWING POLIOMYELITIS: ICD-10-CM

## 2023-04-18 PROCEDURE — 1123F ACP DISCUSS/DSCN MKR DOCD: CPT | Performed by: PHYSICIAN ASSISTANT

## 2023-04-18 PROCEDURE — 99308 SBSQ NF CARE LOW MDM 20: CPT | Performed by: PHYSICIAN ASSISTANT

## 2023-04-20 ENCOUNTER — OFFICE VISIT (OUTPATIENT)
Dept: GERIATRIC MEDICINE | Age: 88
End: 2023-04-20

## 2023-04-20 DIAGNOSIS — F03.90 DEMENTIA, UNSPECIFIED DEMENTIA SEVERITY, UNSPECIFIED DEMENTIA TYPE, UNSPECIFIED WHETHER BEHAVIORAL, PSYCHOTIC, OR MOOD DISTURBANCE OR ANXIETY (HCC): Primary | ICD-10-CM

## 2023-04-20 DIAGNOSIS — I10 PRIMARY HYPERTENSION: ICD-10-CM

## 2023-04-20 DIAGNOSIS — S62.605D FRACTURE OF UNSPECIFIED PHALANX OF LEFT RING FINGER, SUBSEQUENT ENCOUNTER FOR FRACTURE WITH ROUTINE HEALING: ICD-10-CM

## 2023-04-21 ENCOUNTER — OFFICE VISIT (OUTPATIENT)
Dept: GERIATRIC MEDICINE | Age: 88
End: 2023-04-21

## 2023-04-21 DIAGNOSIS — I10 PRIMARY HYPERTENSION: ICD-10-CM

## 2023-04-21 DIAGNOSIS — F03.918 DEMENTIA WITH BEHAVIORAL DISTURBANCE (HCC): ICD-10-CM

## 2023-04-21 DIAGNOSIS — M48.061 SPINAL STENOSIS OF LUMBAR REGION WITH RADICULOPATHY: ICD-10-CM

## 2023-04-21 DIAGNOSIS — M54.16 LUMBAR RADICULAR PAIN: Primary | ICD-10-CM

## 2023-04-21 DIAGNOSIS — M54.16 SPINAL STENOSIS OF LUMBAR REGION WITH RADICULOPATHY: ICD-10-CM

## 2023-04-26 LAB
BASOPHILS ABSOLUTE: 0.1 /ΜL
BASOPHILS RELATIVE PERCENT: 1 %
BUN BLDV-MCNC: 26 MG/DL
CALCIUM SERPL-MCNC: 9.4 MG/DL
CHLORIDE BLD-SCNC: 102 MMOL/L
CO2: 28 MMOL/L
CREAT SERPL-MCNC: 0.9 MG/DL
EGFR: 60
EOSINOPHILS ABSOLUTE: 0.1 /ΜL
EOSINOPHILS RELATIVE PERCENT: 1.6 %
GLUCOSE BLD-MCNC: 82 MG/DL
HCT VFR BLD CALC: 43.7 % (ref 36–46)
HEMOGLOBIN: 14.1 G/DL (ref 12–16)
LYMPHOCYTES ABSOLUTE: 2.7 /ΜL
LYMPHOCYTES RELATIVE PERCENT: 33.4 %
MAGNESIUM: 1.8 MG/DL
MCH RBC QN AUTO: 32.5 PG
MCHC RBC AUTO-ENTMCNC: 32.3 G/DL
MCV RBC AUTO: 100.7 FL
MONOCYTES ABSOLUTE: 0.8 /ΜL
MONOCYTES RELATIVE PERCENT: 10.5 %
NEUTROPHILS ABSOLUTE: 4.2 /ΜL
NEUTROPHILS RELATIVE PERCENT: 53.5 %
PLATELET # BLD: 323 K/ΜL
PMV BLD AUTO: 8.4 FL
POTASSIUM SERPL-SCNC: 4.5 MMOL/L
RBC # BLD: 4.34 10^6/ΜL
SODIUM BLD-SCNC: 140 MMOL/L
WBC # BLD: 7.9 10^3/ML

## 2023-05-03 LAB
BUN BLDV-MCNC: 21 MG/DL
CALCIUM SERPL-MCNC: 10 MG/DL
CHLORIDE BLD-SCNC: 103 MMOL/L
CO2: 25 MMOL/L
CREAT SERPL-MCNC: 0.7 MG/DL
EGFR: 80
GLUCOSE BLD-MCNC: 89 MG/DL
POTASSIUM SERPL-SCNC: 4.7 MMOL/L
SODIUM BLD-SCNC: 139 MMOL/L

## 2023-05-04 ENCOUNTER — OFFICE VISIT (OUTPATIENT)
Dept: GERIATRIC MEDICINE | Age: 88
End: 2023-05-04

## 2023-05-04 DIAGNOSIS — M48.061 SPINAL STENOSIS OF LUMBAR REGION WITH RADICULOPATHY: Primary | ICD-10-CM

## 2023-05-04 DIAGNOSIS — M54.16 SPINAL STENOSIS OF LUMBAR REGION WITH RADICULOPATHY: Primary | ICD-10-CM

## 2023-05-04 DIAGNOSIS — I48.20 ATRIAL FIBRILLATION, CHRONIC (HCC): ICD-10-CM

## 2023-05-04 DIAGNOSIS — F03.918 DEMENTIA WITH BEHAVIORAL DISTURBANCE (HCC): ICD-10-CM

## 2023-05-08 NOTE — PROGRESS NOTES
Subjective:      Patient ID: Isabel Shell is a pleasant 82 y.o. female who presents today for:  No chief complaint on file. [facility]    Patient seen today for follow-up on x-ray of right hand. Showed right 4th middle phalanx fracture with mild displacement. Patient did have orders for immobilizer to be in place, however she continues to remove. On exam today some minor minor swelling, no redness or increased warmth. No open areas. We will continue emphasize finger splint but tape the area to secure so that she can avoid taking it off when attempted. Follow-up with any changes. Tylenol and ice for analgesia.       Patient Active Problem List   Diagnosis    Depression with anxiety    Hypertension    GERD (gastroesophageal reflux disease)    Elevated cholesterol    Atrial fibrillation with RVR (HCC)    Gait disturbance    Lumbar radicular pain    Spinal stenosis of lumbar region with radiculopathy    Senile osteoporosis    Right foot infection    Urinary tract infection without hematuria    AMS (altered mental status)    Coffee ground emesis    Dementia with behavioral disturbance (HCC)    MCI (mild cognitive impairment)    Frontal gait     Past Medical History:   Diagnosis Date    Anxiety     Arthritis     Chronic back pain     DEGENERATIVE BACK    Depression     Distal radial fracture 2012    GERD (gastroesophageal reflux disease)     Glaucoma     History of mammography, screening 2011 CAT 2 BILAT    History of osteopenia     Hypertension     meds > 8 yrs / denies TIA or stroke    Lumbar radicular pain     Polycythemia vera(238.4)     Rib fracture 11/30/2018    Senile osteoporosis     Urinary tract infection without hematuria 11/25/2022     Past Surgical History:   Procedure Laterality Date    APPENDECTOMY      BACK SURGERY      COLONOSCOPY      ENDOSCOPY, COLON, DIAGNOSTIC      EYE SURGERY      OS eye glaucoma OR    FRACTURE SURGERY Left 05/06/2012    distal radial fracture/Dr. Rell Gibson

## 2023-05-09 ASSESSMENT — ENCOUNTER SYMPTOMS
ABDOMINAL DISTENTION: 0
ABDOMINAL PAIN: 0

## 2023-05-09 NOTE — PROGRESS NOTES
Positive for arthralgias (Right 4th digit phalanx fracture). Neurological:  Positive for weakness. Psychiatric/Behavioral:  Positive for confusion and decreased concentration. Negative for agitation, behavioral problems and dysphoric mood. The patient is not nervous/anxious. All other systems reviewed and are negative. Objective:   VS: See [facility]. Reviewed. Physical Exam  Vitals reviewed. Constitutional:       General: She is not in acute distress. Appearance: Normal appearance. She is normal weight. She is not ill-appearing. HENT:      Head: Normocephalic and atraumatic. Mouth/Throat:      Mouth: Mucous membranes are moist.      Pharynx: Oropharynx is clear. Eyes:      Conjunctiva/sclera: Conjunctivae normal.   Cardiovascular:      Rate and Rhythm: Normal rate and regular rhythm. Pulmonary:      Effort: Pulmonary effort is normal.      Breath sounds: Normal breath sounds. Abdominal:      General: Bowel sounds are normal.      Palpations: Abdomen is soft. Musculoskeletal:         General: Tenderness (mild ttp right ring finger) present. No swelling. Normal range of motion. Cervical back: Normal range of motion. Skin:     General: Skin is warm and dry. Findings: No bruising or erythema. Neurological:      Mental Status: She is alert. Mental status is at baseline. She is disoriented. Motor: Weakness present. Comments: A&O 2/3   Psychiatric:         Mood and Affect: Mood normal.         Behavior: Behavior normal.       Assessment:       Diagnosis Orders   1. At high risk for falls        2. Generalized weakness following poliomyelitis        3. Dementia with behavioral disturbance ([facility] [address]        4. MCI (mild cognitive impairment)              Plan:      No orders of the defined types were placed in this encounter. No orders of the defined types were placed in this encounter. Will have nurse perform SLUMS test. Discuss ?  Discharge planning with PT. Guarded

## 2023-05-11 ASSESSMENT — ENCOUNTER SYMPTOMS
ABDOMINAL PAIN: 0
ABDOMINAL DISTENTION: 0

## 2023-05-12 NOTE — PROGRESS NOTES
Subjective:      Patient ID: Anthonette Peabody is a pleasant 82 y.o. female who presents today for:  No chief complaint on file. [facility]    Patient seen today for plan of discharge home over the weekend. Patient did have negative CT scan in hospital and no evident UTI. Did have neuro consultation. Hypertension. Currently under control, however today 152/86 but on the high side. Remaining vital signs 6 bpm, 16 RR, 90.6 °F, 94% SPO2 on room air, WT = 132.2 LBS. Patient neuro consult was uneventful. Likely mild cognitive impairment and onset of dementia. MRI showed no acute intracranial process, age-related general atrophic changes it appears. Seroquel given and has been very helpful since admission. BP has been steadily on the mid to high side around 140s to 150s SBP. We will plan on increasing Coreg upward to 6.25 mg p.o. twice daily. Monitor progress. No new issues from left 4th digit fracture.       Patient Active Problem List   Diagnosis    Depression with anxiety    Hypertension    GERD (gastroesophageal reflux disease)    Elevated cholesterol    Atrial fibrillation with RVR (HCC)    Gait disturbance    Lumbar radicular pain    Spinal stenosis of lumbar region with radiculopathy    Senile osteoporosis    Right foot infection    Urinary tract infection without hematuria    AMS (altered mental status)    Coffee ground emesis    Dementia with behavioral disturbance (HCC)    MCI (mild cognitive impairment)    Frontal gait     Past Medical History:   Diagnosis Date    Anxiety     Arthritis     Chronic back pain     DEGENERATIVE BACK    Depression     Distal radial fracture 2012    GERD (gastroesophageal reflux disease)     Glaucoma     History of mammography, screening 2011 CAT 2 BILAT    History of osteopenia     Hypertension     meds > 8 yrs / denies TIA or stroke    Lumbar radicular pain     Polycythemia vera(238.4)     Rib fracture 11/30/2018    Senile osteoporosis     Urinary tract infection

## 2023-05-16 ENCOUNTER — OFFICE VISIT (OUTPATIENT)
Dept: GERIATRIC MEDICINE | Age: 88
End: 2023-05-16

## 2023-05-16 DIAGNOSIS — Z71.89 GOALS OF CARE, COUNSELING/DISCUSSION: ICD-10-CM

## 2023-05-16 DIAGNOSIS — K21.9 GASTROESOPHAGEAL REFLUX DISEASE WITHOUT ESOPHAGITIS: ICD-10-CM

## 2023-05-16 DIAGNOSIS — F03.918 DEMENTIA WITH BEHAVIORAL DISTURBANCE (HCC): ICD-10-CM

## 2023-05-16 DIAGNOSIS — Z79.899 ENCOUNTER FOR MEDICATION MANAGEMENT: Primary | ICD-10-CM

## 2023-05-18 LAB
BUN BLDV-MCNC: 17 MG/DL
CALCIUM SERPL-MCNC: 10.1 MG/DL
CHLORIDE BLD-SCNC: 99 MMOL/L
CO2: 27 MMOL/L
CREAT SERPL-MCNC: 0.8 MG/DL
EGFR: 68
GLUCOSE BLD-MCNC: 101 MG/DL
POTASSIUM SERPL-SCNC: 4.6 MMOL/L
SODIUM BLD-SCNC: 137 MMOL/L

## 2023-05-18 NOTE — PROGRESS NOTES
Patient Name: Arvin Cisse    YOB: 1935  Medical Record Number: [id number]    History of Present Illness:  44-year-old woman admitted here with dementia and agitation patient had increasing confusion weakness patient had a recent falls patient due to change in bowel bladder with patient had recent initiation of atypical antipsychotic patient was stabilized transferred for ongoing course of care. Patient has required ongoing one-on-one care while hospitalized with goals of maximizing her cognition and transferred for ongoing course of care    Review of Systems   Unable to perform ROS: Dementia   All other systems reviewed and are negative.     Review of Systems: All 14 review of systems negative other than as stated above    Social History     Tobacco Use    Smoking status: Never    Smokeless tobacco: Never   Substance Use Topics    Alcohol use: No     Comment: denies    Drug use: No         Past Medical History:   Diagnosis Date    Anxiety     Arthritis     Chronic back pain     DEGENERATIVE BACK    Depression     Distal radial fracture 2012    GERD (gastroesophageal reflux disease)     Glaucoma     History of mammography, screening 2011 CAT 2 BILAT    History of osteopenia     Hypertension     meds > 8 yrs / denies TIA or stroke    Lumbar radicular pain     Polycythemia vera(238.4)     Rib fracture 11/30/2018    Senile osteoporosis     Urinary tract infection without hematuria 11/25/2022           Past Surgical History:   Procedure Laterality Date    APPENDECTOMY      BACK SURGERY      COLONOSCOPY      ENDOSCOPY, COLON, DIAGNOSTIC      EYE SURGERY      OS eye glaucoma OR    FRACTURE SURGERY Left 05/06/2012    distal radial fracture/Dr. Martir Padilla    HYSTERECTOMY (CERVIX STATUS UNKNOWN)      GA PERQ VERTEBROPLASTY UNI/BI INJX CERVICOTHORACIC N/A 3/7/2018    T-11, T-12 VERTEBRAL AUGMENTATION performed by Wanda Michel MD at [facility]         Current Outpatient Medications on File Prior to Visit   Medication

## 2023-05-19 ENCOUNTER — OFFICE VISIT (OUTPATIENT)
Dept: GERIATRIC MEDICINE | Age: 88
End: 2023-05-19
Payer: MEDICARE

## 2023-05-19 DIAGNOSIS — K30 PERSISTENT INDIGESTION: ICD-10-CM

## 2023-05-19 DIAGNOSIS — K21.9 GASTROESOPHAGEAL REFLUX DISEASE WITHOUT ESOPHAGITIS: Primary | ICD-10-CM

## 2023-05-19 PROCEDURE — 99308 SBSQ NF CARE LOW MDM 20: CPT | Performed by: PHYSICIAN ASSISTANT

## 2023-05-19 PROCEDURE — 1123F ACP DISCUSS/DSCN MKR DOCD: CPT | Performed by: PHYSICIAN ASSISTANT

## 2023-05-25 NOTE — PROGRESS NOTES
crazziness           Review of Systems   Unable to perform ROS: Dementia     Objective:   VS: See [address]. Reviewed. Physical Exam  Vitals (See HPI) reviewed. Constitutional:       General: She is not in acute distress. Appearance: Normal appearance. She is normal weight. She is not ill-appearing. HENT:      Head: Normocephalic and atraumatic. Mouth/Throat:      Mouth: Mucous membranes are moist.      Pharynx: Oropharynx is clear. Eyes:      Conjunctiva/sclera: Conjunctivae normal.   Cardiovascular:      Rate and Rhythm: Normal rate and regular rhythm. Pulmonary:      Effort: Pulmonary effort is normal.      Breath sounds: Normal breath sounds. Abdominal:      General: Bowel sounds are normal.      Palpations: Abdomen is soft. Musculoskeletal:         General: Normal range of motion. Cervical back: Normal range of motion. Skin:     General: Skin is warm and dry. Findings: No bruising or erythema. Neurological:      Mental Status: She is alert. Mental status is at baseline. She is disoriented. Motor: Weakness present. Comments: A&O 2/3   Psychiatric:         Mood and Affect: Mood normal.         Behavior: Behavior normal.      Comments: Alert and oriented x 2/3       Assessment:       Diagnosis Orders   1. Encounter for medication management        2. Goals of care, counseling/discussion        3. Gastroesophageal reflux disease without esophagitis        4. Dementia with behavioral disturbance ([facility] [address]              Plan:      No orders of the defined types were placed in this encounter. No orders of the defined types were placed in this encounter. No changes to medications. No new acute changes. Pt transitin to memory care unit successful thus far. Cont to monitor and will address any cute issues should they arise. Did answer all questions with POA. No new acute GI symptoms. VSS. No follow-ups on file.       Iraj Bean      Please note orders entered on site at

## 2023-05-30 ASSESSMENT — ENCOUNTER SYMPTOMS: ABDOMINAL PAIN: 0

## 2023-05-30 NOTE — PROGRESS NOTES
Subjective:      Patient ID: Isatu Cho is a pleasant 82 y.o. female who presents today for:  No chief complaint on file. [facility]    Did speak with patient's POA today about her ongoing GERD symptoms. Stating that she is having increased indigestion and irritation with food in facility. POA states when eating spicy or highly acidic foods, does have increased symptoms. Is on 20 mg p.o. daily omeprazole at the moment. Will increase to 40 mg p.o. daily. Monitor for increased events. No additional concerns otherwise.       Patient Active Problem List   Diagnosis    Depression with anxiety    Hypertension    GERD (gastroesophageal reflux disease)    Elevated cholesterol    Atrial fibrillation with RVR (HCC)    Gait disturbance    Lumbar radicular pain    Spinal stenosis of lumbar region with radiculopathy    Senile osteoporosis    Right foot infection    Urinary tract infection without hematuria    AMS (altered mental status)    Coffee ground emesis    Dementia with behavioral disturbance (HCC)    MCI (mild cognitive impairment)    Frontal gait     Past Medical History:   Diagnosis Date    Anxiety     Arthritis     Chronic back pain     DEGENERATIVE BACK    Depression     Distal radial fracture 2012    GERD (gastroesophageal reflux disease)     Glaucoma     History of mammography, screening 2011 CAT 2 BILAT    History of osteopenia     Hypertension     meds > 8 yrs / denies TIA or stroke    Lumbar radicular pain     Polycythemia vera(238.4)     Rib fracture 11/30/2018    Senile osteoporosis     Urinary tract infection without hematuria 11/25/2022     Past Surgical History:   Procedure Laterality Date    APPENDECTOMY      BACK SURGERY      COLONOSCOPY      ENDOSCOPY, COLON, DIAGNOSTIC      EYE SURGERY      OS eye glaucoma OR    FRACTURE SURGERY Left 05/06/2012    distal radial fracture/Dr. Annie Whalen    HYSTERECTOMY (CERVIX STATUS UNKNOWN)      MO PERQ VERTEBROPLASTY UNI/BI INJX CERVICOTHORACIC N/A

## 2023-06-03 NOTE — PROGRESS NOTES
SUBJECTIVE:  This 82-year-old woman seen her in follow-up visit for dementia afibrillation degenerative disease lumbar radiculopathy. Patient has ongoing weakness no new falls remains globally unsteady      ROS: Limited by cognition  The rest of the 14 point ROS negative    PHYSICAL EXAM: VSS per facility record  Pupils are small PERRL reactive oral mucosa moist chest showed no crackles wheezing cardiovascular showed a regular rate abdomen soft nontender EXTR with trace edema skin no new rash    ASSESSMENT & PLAN:   Diagnosis Orders   1. Spinal stenosis of lumbar region with radiculopathy        2. Dementia with behavioral disturbance ([address]        3. Atrial fibrillation, chronic ([address]          Continue with nutritional support reorientation as able is on beta-blocker function stable this time. BMP CBC pending.   Notably the onset of new RVR            Past Medical History:   Diagnosis Date    Anxiety     Arthritis     Chronic back pain     DEGENERATIVE BACK    Depression     Distal radial fracture 2012    GERD (gastroesophageal reflux disease)     Glaucoma     History of mammography, screening 2011 CAT 2 BILAT    History of osteopenia     Hypertension     meds > 8 yrs / denies TIA or stroke    Lumbar radicular pain     Polycythemia vera(238.4)     Rib fracture 11/30/2018    Senile osteoporosis     Urinary tract infection without hematuria 11/25/2022         Past Surgical History:   Procedure Laterality Date    APPENDECTOMY      BACK SURGERY      COLONOSCOPY      ENDOSCOPY, COLON, DIAGNOSTIC      EYE SURGERY      OS eye glaucoma OR    FRACTURE SURGERY Left 05/06/2012    distal radial fracture/Dr. Keisha Arriaza    HYSTERECTOMY (CERVIX STATUS UNKNOWN)      MD PERQ VERTEBROPLASTY UNI/BI INJX CERVICOTHORACIC N/A 3/7/2018    T-11, T-12 VERTEBRAL AUGMENTATION performed by Harshil Rollins MD at [facility]         Current Outpatient Medications on File Prior to Visit   Medication Sig Dispense Refill    latanoprost (XALATAN) 0.005 %

## 2023-06-09 LAB
BASOPHILS ABSOLUTE: 0.1 /ΜL
BASOPHILS RELATIVE PERCENT: 0.9 %
BUN BLDV-MCNC: 16 MG/DL
CALCIUM SERPL-MCNC: 9.1 MG/DL
CHLORIDE BLD-SCNC: 105 MMOL/L
CO2: 24 MMOL/L
CREAT SERPL-MCNC: 0.7 MG/DL
EGFR: 80
EOSINOPHILS ABSOLUTE: 0.3 /ΜL
EOSINOPHILS RELATIVE PERCENT: 2.4 %
GLUCOSE BLD-MCNC: 96 MG/DL
HCT VFR BLD CALC: 41.8 % (ref 36–46)
HEMOGLOBIN: 13.8 G/DL (ref 12–16)
LYMPHOCYTES ABSOLUTE: 2.4 /ΜL
LYMPHOCYTES RELATIVE PERCENT: 20.7 %
MCH RBC QN AUTO: 32.3 PG
MCHC RBC AUTO-ENTMCNC: 32.9 G/DL
MCV RBC AUTO: 98.2 FL
MONOCYTES ABSOLUTE: 0.9 /ΜL
MONOCYTES RELATIVE PERCENT: 7.8 %
NEUTROPHILS ABSOLUTE: 6 /ΜL
NEUTROPHILS RELATIVE PERCENT: 68.2 %
PLATELET # BLD: 313 K/ΜL
PMV BLD AUTO: 8.5 FL
POTASSIUM SERPL-SCNC: 4.6 MMOL/L
RBC # BLD: 4.25 10^6/ΜL
SODIUM BLD-SCNC: 141 MMOL/L
WBC # BLD: 11.7 10^3/ML

## 2023-06-12 LAB
BASOPHILS ABSOLUTE: 0.1 /ΜL
BASOPHILS RELATIVE PERCENT: 1.1 %
EOSINOPHILS ABSOLUTE: 0.2 /ΜL
EOSINOPHILS RELATIVE PERCENT: 2.3 %
HCT VFR BLD CALC: 42 % (ref 36–46)
HEMOGLOBIN: 13.9 G/DL (ref 12–16)
LYMPHOCYTES ABSOLUTE: 1.9 /ΜL
LYMPHOCYTES RELATIVE PERCENT: 20.2 %
MCH RBC QN AUTO: 32.5 PG
MCHC RBC AUTO-ENTMCNC: 33.2 G/DL
MCV RBC AUTO: 97.7 FL
MONOCYTES ABSOLUTE: 0.9 /ΜL
MONOCYTES RELATIVE PERCENT: 9.3 %
NEUTROPHILS ABSOLUTE: 6.5 /ΜL
NEUTROPHILS RELATIVE PERCENT: 67.1 %
PLATELET # BLD: 355 K/ΜL
PMV BLD AUTO: 8.8 FL
RBC # BLD: 4.29 10^6/ΜL
WBC # BLD: 9.6 10^3/ML

## 2023-06-13 ENCOUNTER — OFFICE VISIT (OUTPATIENT)
Dept: GERIATRIC MEDICINE | Age: 88
End: 2023-06-13

## 2023-06-13 DIAGNOSIS — M54.16 LUMBAR RADICULAR PAIN: ICD-10-CM

## 2023-06-13 DIAGNOSIS — F03.918 DEMENTIA WITH BEHAVIORAL DISTURBANCE (HCC): ICD-10-CM

## 2023-06-13 DIAGNOSIS — I10 PRIMARY HYPERTENSION: Primary | ICD-10-CM

## 2023-06-13 LAB
BILIRUBIN, URINE: NEGATIVE
BLOOD, URINE: NEGATIVE
CLARITY: CLEAR
COLOR: YELLOW
GLUCOSE URINE: NEGATIVE
KETONES, URINE: NEGATIVE
LEUKOCYTE ESTERASE, URINE: NORMAL
NITRITE, URINE: NEGATIVE
PH UA: 6 (ref 4.5–8)
PROTEIN UA: NEGATIVE
SPECIFIC GRAVITY, URINE: 1.02
UROBILINOGEN, URINE: NORMAL

## 2023-06-28 ENCOUNTER — OFFICE VISIT (OUTPATIENT)
Dept: OBGYN CLINIC | Age: 88
End: 2023-06-28

## 2023-06-28 VITALS — SYSTOLIC BLOOD PRESSURE: 122 MMHG | BODY MASS INDEX: 23.91 KG/M2 | WEIGHT: 135 LBS | DIASTOLIC BLOOD PRESSURE: 76 MMHG

## 2023-06-28 DIAGNOSIS — N76.1 CHRONIC VAGINITIS: Primary | ICD-10-CM

## 2023-06-29 ASSESSMENT — ENCOUNTER SYMPTOMS
APNEA: 0
ABDOMINAL PAIN: 0
SHORTNESS OF BREATH: 0

## 2023-06-30 ENCOUNTER — HOSPITAL ENCOUNTER (EMERGENCY)
Age: 88
Discharge: HOME | End: 2023-07-01
Payer: MEDICARE

## 2023-06-30 ENCOUNTER — OFFICE VISIT (OUTPATIENT)
Dept: GERIATRIC MEDICINE | Age: 88
End: 2023-06-30
Payer: MEDICARE

## 2023-06-30 DIAGNOSIS — F03.918 DEMENTIA WITH BEHAVIORAL DISTURBANCE (HCC): ICD-10-CM

## 2023-06-30 DIAGNOSIS — R45.1 AGITATION: Primary | ICD-10-CM

## 2023-06-30 DIAGNOSIS — F41.8 DEPRESSION WITH ANXIETY: ICD-10-CM

## 2023-06-30 DIAGNOSIS — K21.9 GASTROESOPHAGEAL REFLUX DISEASE WITHOUT ESOPHAGITIS: Primary | ICD-10-CM

## 2023-06-30 PROCEDURE — 99308 SBSQ NF CARE LOW MDM 20: CPT | Performed by: PHYSICIAN ASSISTANT

## 2023-06-30 PROCEDURE — 1123F ACP DISCUSS/DSCN MKR DOCD: CPT | Performed by: PHYSICIAN ASSISTANT

## 2023-06-30 PROCEDURE — 99284 EMERGENCY DEPT VISIT MOD MDM: CPT

## 2023-07-01 ENCOUNTER — APPOINTMENT (OUTPATIENT)
Dept: GENERAL RADIOLOGY | Age: 88
End: 2023-07-01
Payer: MEDICARE

## 2023-07-01 VITALS
OXYGEN SATURATION: 97 % | SYSTOLIC BLOOD PRESSURE: 159 MMHG | RESPIRATION RATE: 17 BRPM | DIASTOLIC BLOOD PRESSURE: 92 MMHG | TEMPERATURE: 97.8 F | HEART RATE: 80 BPM

## 2023-07-01 LAB
ALBUMIN SERPL-MCNC: 3.7 G/DL (ref 3.5–4.6)
ALP SERPL-CCNC: 107 U/L (ref 40–130)
ALT SERPL-CCNC: 11 U/L (ref 0–33)
ANION GAP SERPL CALCULATED.3IONS-SCNC: 13 MEQ/L (ref 9–15)
AST SERPL-CCNC: 18 U/L (ref 0–35)
BASOPHILS # BLD: 0 K/UL (ref 0–0.2)
BASOPHILS NFR BLD: 0.5 %
BILIRUB SERPL-MCNC: 0.3 MG/DL (ref 0.2–0.7)
BILIRUB UR QL STRIP: NEGATIVE
BUN SERPL-MCNC: 18 MG/DL (ref 8–23)
CALCIUM SERPL-MCNC: 9.3 MG/DL (ref 8.5–9.9)
CHLORIDE SERPL-SCNC: 99 MEQ/L (ref 95–107)
CLARITY UR: CLEAR
CO2 SERPL-SCNC: 25 MEQ/L (ref 20–31)
COLOR UR: YELLOW
CREAT SERPL-MCNC: 0.69 MG/DL (ref 0.5–0.9)
EOSINOPHIL # BLD: 0.4 K/UL (ref 0–0.7)
EOSINOPHIL NFR BLD: 4.1 %
ERYTHROCYTE [DISTWIDTH] IN BLOOD BY AUTOMATED COUNT: 13.8 % (ref 11.5–14.5)
GLOBULIN SER CALC-MCNC: 3.2 G/DL (ref 2.3–3.5)
GLUCOSE SERPL-MCNC: 117 MG/DL (ref 70–99)
GLUCOSE UR STRIP-MCNC: NEGATIVE MG/DL
HCT VFR BLD AUTO: 44.4 % (ref 37–47)
HGB BLD-MCNC: 14.7 G/DL (ref 12–16)
HGB UR QL STRIP: ABNORMAL
KETONES UR STRIP-MCNC: NEGATIVE MG/DL
LEUKOCYTE ESTERASE UR QL STRIP: ABNORMAL
LYMPHOCYTES # BLD: 1.9 K/UL (ref 1–4.8)
LYMPHOCYTES NFR BLD: 22.3 %
MCH RBC QN AUTO: 31.5 PG (ref 27–31.3)
MCHC RBC AUTO-ENTMCNC: 33.1 % (ref 33–37)
MCV RBC AUTO: 94.9 FL (ref 79.4–94.8)
MONOCYTES # BLD: 0.8 K/UL (ref 0.2–0.8)
MONOCYTES NFR BLD: 9.9 %
NEUTROPHILS # BLD: 5.4 K/UL (ref 1.4–6.5)
NEUTS SEG NFR BLD: 63.2 %
NITRITE UR QL STRIP: NEGATIVE
PH UR STRIP: 6.5 [PH] (ref 5–9)
PLATELET # BLD AUTO: 328 K/UL (ref 130–400)
POTASSIUM SERPL-SCNC: 4.5 MEQ/L (ref 3.4–4.9)
PROCALCITONIN SERPL IA-MCNC: 0.02 NG/ML (ref 0–0.15)
PROT SERPL-MCNC: 6.9 G/DL (ref 6.3–8)
PROT UR STRIP-MCNC: ABNORMAL MG/DL
RBC # BLD AUTO: 4.68 M/UL (ref 4.2–5.4)
SODIUM SERPL-SCNC: 137 MEQ/L (ref 135–144)
SP GR UR STRIP: 1.01 (ref 1–1.03)
URINE REFLEX TO CULTURE: ABNORMAL
UROBILINOGEN UR STRIP-ACNC: 0.2 E.U./DL
WBC # BLD AUTO: 8.6 K/UL (ref 4.8–10.8)

## 2023-07-01 PROCEDURE — 80053 COMPREHEN METABOLIC PANEL: CPT

## 2023-07-01 PROCEDURE — 85025 COMPLETE CBC W/AUTO DIFF WBC: CPT

## 2023-07-01 PROCEDURE — 6370000000 HC RX 637 (ALT 250 FOR IP)

## 2023-07-01 PROCEDURE — 84145 PROCALCITONIN (PCT): CPT

## 2023-07-01 PROCEDURE — 36415 COLL VENOUS BLD VENIPUNCTURE: CPT

## 2023-07-01 PROCEDURE — 81003 URINALYSIS AUTO W/O SCOPE: CPT

## 2023-07-01 PROCEDURE — 71045 X-RAY EXAM CHEST 1 VIEW: CPT

## 2023-07-01 RX ORDER — QUETIAPINE FUMARATE 25 MG/1
25 TABLET, FILM COATED ORAL 2 TIMES DAILY
Status: DISCONTINUED | OUTPATIENT
Start: 2023-07-01 | End: 2023-07-01

## 2023-07-01 RX ORDER — QUETIAPINE FUMARATE 25 MG/1
25 TABLET, FILM COATED ORAL ONCE
Status: COMPLETED | OUTPATIENT
Start: 2023-07-01 | End: 2023-07-01

## 2023-07-01 RX ORDER — ACETAMINOPHEN 325 MG/1
650 TABLET ORAL ONCE
Status: COMPLETED | OUTPATIENT
Start: 2023-07-01 | End: 2023-07-01

## 2023-07-01 RX ADMIN — QUETIAPINE FUMARATE 25 MG: 25 TABLET ORAL at 00:32

## 2023-07-01 RX ADMIN — ACETAMINOPHEN 650 MG: 325 TABLET ORAL at 01:16

## 2023-07-01 ASSESSMENT — LIFESTYLE VARIABLES
HOW OFTEN DO YOU HAVE A DRINK CONTAINING ALCOHOL: NEVER
HOW MANY STANDARD DRINKS CONTAINING ALCOHOL DO YOU HAVE ON A TYPICAL DAY: PATIENT DOES NOT DRINK

## 2023-07-11 LAB
BILIRUBIN, URINE: NEGATIVE
BLOOD, URINE: POSITIVE
CLARITY: ABNORMAL
COLOR: YELLOW
GLUCOSE URINE: ABNORMAL
KETONES, URINE: NEGATIVE
LEUKOCYTE ESTERASE, URINE: ABNORMAL
NITRITE, URINE: NEGATIVE
PH UA: 5 (ref 4.5–8)
PROTEIN UA: ABNORMAL
SPECIFIC GRAVITY, URINE: 1.02
UROBILINOGEN, URINE: NORMAL

## 2023-07-12 LAB
BASOPHILS ABSOLUTE: 0.1 /ΜL
BASOPHILS RELATIVE PERCENT: 0.7 %
BUN BLDV-MCNC: 17 MG/DL
CALCIUM SERPL-MCNC: 8.9 MG/DL
CHLORIDE BLD-SCNC: 104 MMOL/L
CO2: 26 MMOL/L
CREAT SERPL-MCNC: 0.7 MG/DL
EGFR: 80
EOSINOPHILS ABSOLUTE: 0.3 /ΜL
EOSINOPHILS RELATIVE PERCENT: 3.5 %
GLUCOSE BLD-MCNC: 79 MG/DL
HCT VFR BLD CALC: 42.3 % (ref 36–46)
HEMOGLOBIN: 13.7 G/DL (ref 12–16)
LYMPHOCYTES ABSOLUTE: 2.7 /ΜL
LYMPHOCYTES RELATIVE PERCENT: 33.3 %
MCH RBC QN AUTO: 31.4 PG
MCHC RBC AUTO-ENTMCNC: 32.3 G/DL
MCV RBC AUTO: 97.2 FL
MONOCYTES ABSOLUTE: 0.8 /ΜL
MONOCYTES RELATIVE PERCENT: 10.1 %
NEUTROPHILS ABSOLUTE: 4.2 /ΜL
NEUTROPHILS RELATIVE PERCENT: 52.4 %
PLATELET # BLD: 290 K/ΜL
PMV BLD AUTO: 8.4 FL
POTASSIUM SERPL-SCNC: 4.7 MMOL/L
RBC # BLD: 4.35 10^6/ΜL
SODIUM BLD-SCNC: 142 MMOL/L
WBC # BLD: 8 10^3/ML

## 2023-07-14 ENCOUNTER — OFFICE VISIT (OUTPATIENT)
Dept: GERIATRIC MEDICINE | Age: 88
End: 2023-07-14
Payer: MEDICARE

## 2023-07-14 DIAGNOSIS — F03.918 AGGRESSIVE BEHAVIOR DUE TO DEMENTIA (HCC): Primary | ICD-10-CM

## 2023-07-14 PROCEDURE — 99309 SBSQ NF CARE MODERATE MDM 30: CPT | Performed by: PHYSICIAN ASSISTANT

## 2023-07-14 PROCEDURE — 1123F ACP DISCUSS/DSCN MKR DOCD: CPT | Performed by: PHYSICIAN ASSISTANT

## 2023-07-17 PROBLEM — S22.42XA MULTIPLE CLOSED FRACTURES OF RIBS OF LEFT SIDE: Status: ACTIVE | Noted: 2018-12-01

## 2023-07-17 PROBLEM — S22.080A WEDGE COMPRESSION FRACTURE OF T11-T12 VERTEBRA, INITIAL ENCOUNTER FOR CLOSED FRACTURE (HCC): Status: ACTIVE | Noted: 2017-12-15

## 2023-07-17 PROBLEM — F11.20 OPIOID DEPENDENCE, DAILY USE (HCC): Status: ACTIVE | Noted: 2018-02-02

## 2023-07-17 PROBLEM — M15.3 SECONDARY OSTEOARTHRITIS OF MULTIPLE SITES: Status: ACTIVE | Noted: 2018-03-01

## 2023-07-17 PROBLEM — M25.561 CHRONIC PAIN OF BOTH KNEES: Status: ACTIVE | Noted: 2018-03-01

## 2023-07-17 PROBLEM — G89.29 CHRONIC PAIN OF BOTH KNEES: Status: ACTIVE | Noted: 2018-03-01

## 2023-07-17 PROBLEM — R29.890 HEIGHT LOSS: Status: ACTIVE | Noted: 2018-03-01

## 2023-07-17 PROBLEM — E83.52 HYPERCALCEMIA: Status: ACTIVE | Noted: 2018-03-04

## 2023-07-17 PROBLEM — M25.562 CHRONIC PAIN OF BOTH KNEES: Status: ACTIVE | Noted: 2018-03-01

## 2023-07-17 PROBLEM — M25.60 JOINT STIFFNESS OF MULTIPLE SITES: Status: ACTIVE | Noted: 2018-03-01

## 2023-07-17 ASSESSMENT — PATIENT HEALTH QUESTIONNAIRE - PHQ9
SUM OF ALL RESPONSES TO PHQ QUESTIONS 1-9: 0
DEPRESSION UNABLE TO ASSESS: FUNCTIONAL CAPACITY MOTIVATION LIMITS ACCURACY
2. FEELING DOWN, DEPRESSED OR HOPELESS: 0
SUM OF ALL RESPONSES TO PHQ QUESTIONS 1-9: 0
SUM OF ALL RESPONSES TO PHQ9 QUESTIONS 1 & 2: 0
SUM OF ALL RESPONSES TO PHQ QUESTIONS 1-9: 0
1. LITTLE INTEREST OR PLEASURE IN DOING THINGS: 0
SUM OF ALL RESPONSES TO PHQ QUESTIONS 1-9: 0

## 2023-07-17 NOTE — PROGRESS NOTES
Subjective:      Patient ID: Millicent Valadez is a pleasant 82 y.o. female who presents today for:  No chief complaint on file. [facility]    Patient is being seen today for monthly follow-up. Patient has history of Alzheimer's dementia, GERD with coffee-ground emesis in the past, as well as depression with anxiety. Patient overall mood is stable. She has had episodes of irritability/agitation with her roommate. Can be redirected, however needs frequent monitoring. No new stepwise decline in mentation or cognition. No new reported GERD or indigestion symptoms. Negative for hematemesis, no stomach discomfort or vomiting reported. Good appetite overall.,  Weight is been stable. Continuing to monitor for changes, follow-up in 1 month.       Patient Active Problem List   Diagnosis    Depression with anxiety    Hypertension    GERD (gastroesophageal reflux disease)    Elevated cholesterol    Atrial fibrillation with RVR (HCC)    Gait disturbance    Lumbar radicular pain    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 degeneration of both eyes    Spinal stenosis of lumbar region with radiculopathy    Senile osteoporosis    Right foot infection    Urinary tract infection without hematuria    AMS (altered mental status)    Coffee ground emesis    Dementia with behavioral disturbance (HCC)    MCI (mild cognitive impairment)    Frontal gait    Chronic pain of both knees    Height loss    Hypercalcemia    Joint stiffness of multiple sites    Multiple closed fractures of ribs of left side    Opioid dependence, daily use (HCC)    Secondary osteoarthritis of multiple sites    Wedge compression fracture of T11-T12 vertebra, initial encounter for closed fracture [facility])     Past Medical History:   Diagnosis Date    Anxiety     Arthritis     Chronic back pain     DEGENERATIVE BACK    Depression     Distal radial fracture 2012    GERD (gastroesophageal reflux disease)     Glaucoma     History of mammography,

## 2023-07-31 ENCOUNTER — HOSPITAL ENCOUNTER (INPATIENT)
Age: 88
LOS: 3 days | Discharge: NURSING, REHAB OR LONG-TERM CARE | End: 2023-08-04
Attending: INTERNAL MEDICINE | Admitting: INTERNAL MEDICINE
Payer: MEDICARE

## 2023-07-31 ENCOUNTER — APPOINTMENT (OUTPATIENT)
Dept: GENERAL RADIOLOGY | Age: 88
End: 2023-07-31
Payer: MEDICARE

## 2023-07-31 ENCOUNTER — OFFICE VISIT (OUTPATIENT)
Dept: GERIATRIC MEDICINE | Age: 88
End: 2023-07-31
Payer: MEDICARE

## 2023-07-31 ENCOUNTER — APPOINTMENT (OUTPATIENT)
Dept: CT IMAGING | Age: 88
End: 2023-07-31
Payer: MEDICARE

## 2023-07-31 VITALS
SYSTOLIC BLOOD PRESSURE: 132 MMHG | RESPIRATION RATE: 16 BRPM | TEMPERATURE: 97.5 F | DIASTOLIC BLOOD PRESSURE: 78 MMHG | HEART RATE: 86 BPM | BODY MASS INDEX: 23.91 KG/M2 | OXYGEN SATURATION: 96 % | WEIGHT: 135 LBS

## 2023-07-31 DIAGNOSIS — R00.0 TACHYCARDIA: ICD-10-CM

## 2023-07-31 DIAGNOSIS — Z00.00 MEDICARE ANNUAL WELLNESS VISIT, SUBSEQUENT: Primary | ICD-10-CM

## 2023-07-31 DIAGNOSIS — R09.02 HYPOXIA: ICD-10-CM

## 2023-07-31 DIAGNOSIS — F03.90 DEMENTIA, UNSPECIFIED DEMENTIA SEVERITY, UNSPECIFIED DEMENTIA TYPE, UNSPECIFIED WHETHER BEHAVIORAL, PSYCHOTIC, OR MOOD DISTURBANCE OR ANXIETY (HCC): ICD-10-CM

## 2023-07-31 DIAGNOSIS — I48.91 ATRIAL FIBRILLATION WITH RVR (HCC): ICD-10-CM

## 2023-07-31 DIAGNOSIS — U07.1 COVID-19 VIRUS INFECTION: Primary | ICD-10-CM

## 2023-07-31 LAB
ALBUMIN SERPL-MCNC: 4.1 G/DL (ref 3.5–4.6)
ALP SERPL-CCNC: 77 U/L (ref 40–130)
ALT SERPL-CCNC: 15 U/L (ref 0–33)
ANION GAP SERPL CALCULATED.3IONS-SCNC: 13 MEQ/L (ref 9–15)
APTT PPP: 28.4 SEC (ref 24.4–36.8)
AST SERPL-CCNC: 41 U/L (ref 0–35)
B PARAP IS1001 DNA NPH QL NAA+NON-PROBE: NOT DETECTED
B PERT.PT PRMT NPH QL NAA+NON-PROBE: NOT DETECTED
BASE EXCESS ARTERIAL: -1 (ref -3–3)
BASOPHILS # BLD: 0 K/UL (ref 0–0.2)
BASOPHILS NFR BLD: 0.3 %
BILIRUB SERPL-MCNC: 0.8 MG/DL (ref 0.2–0.7)
BUN SERPL-MCNC: 20 MG/DL (ref 8–23)
C PNEUM DNA NPH QL NAA+NON-PROBE: NOT DETECTED
CALCIUM IONIZED: 1.12 MMOL/L (ref 1.12–1.32)
CALCIUM SERPL-MCNC: 9.1 MG/DL (ref 8.5–9.9)
CHLORIDE SERPL-SCNC: 102 MEQ/L (ref 95–107)
CK SERPL-CCNC: 856 U/L (ref 0–170)
CO2 SERPL-SCNC: 26 MEQ/L (ref 20–31)
CREAT SERPL-MCNC: 0.74 MG/DL (ref 0.5–0.9)
EOSINOPHIL # BLD: 0 K/UL (ref 0–0.7)
EOSINOPHIL NFR BLD: 0 %
ERYTHROCYTE [DISTWIDTH] IN BLOOD BY AUTOMATED COUNT: 15 % (ref 11.5–14.5)
FLUAV RNA NPH QL NAA+NON-PROBE: NOT DETECTED
FLUBV RNA NPH QL NAA+NON-PROBE: NOT DETECTED
GLOBULIN SER CALC-MCNC: 2.7 G/DL (ref 2.3–3.5)
GLUCOSE BLD-MCNC: 85 MG/DL (ref 70–99)
GLUCOSE SERPL-MCNC: 92 MG/DL (ref 70–99)
HADV DNA NPH QL NAA+NON-PROBE: NOT DETECTED
HCO3 ARTERIAL: 23.6 MMOL/L (ref 21–29)
HCOV 229E RNA NPH QL NAA+NON-PROBE: NOT DETECTED
HCOV HKU1 RNA NPH QL NAA+NON-PROBE: NOT DETECTED
HCOV NL63 RNA NPH QL NAA+NON-PROBE: NOT DETECTED
HCOV OC43 RNA NPH QL NAA+NON-PROBE: NOT DETECTED
HCT VFR BLD AUTO: 47 % (ref 36–48)
HCT VFR BLD AUTO: 49.6 % (ref 37–47)
HGB BLD CALC-MCNC: 15.8 GM/DL (ref 12–16)
HGB BLD-MCNC: 16.1 G/DL (ref 12–16)
HMPV RNA NPH QL NAA+NON-PROBE: NOT DETECTED
HPIV1 RNA NPH QL NAA+NON-PROBE: NOT DETECTED
HPIV2 RNA NPH QL NAA+NON-PROBE: NOT DETECTED
HPIV3 RNA NPH QL NAA+NON-PROBE: NOT DETECTED
HPIV4 RNA NPH QL NAA+NON-PROBE: NOT DETECTED
INR PPP: 1.1
LACTATE BLDV-SCNC: 1.4 MMOL/L (ref 0.5–2.2)
LACTATE: 0.59 MMOL/L (ref 0.4–2)
LYMPHOCYTES # BLD: 1.5 K/UL (ref 1–4.8)
LYMPHOCYTES NFR BLD: 13.3 %
M PNEUMO DNA NPH QL NAA+NON-PROBE: NOT DETECTED
MCH RBC QN AUTO: 31 PG (ref 27–31.3)
MCHC RBC AUTO-ENTMCNC: 32.6 % (ref 33–37)
MCV RBC AUTO: 95.2 FL (ref 79.4–94.8)
MONOCYTES # BLD: 1.3 K/UL (ref 0.2–0.8)
MONOCYTES NFR BLD: 11.7 %
NEUTROPHILS # BLD: 8.3 K/UL (ref 1.4–6.5)
NEUTS SEG NFR BLD: 74.7 %
O2 SAT, ARTERIAL: 95 % (ref 93–100)
PCO2 ARTERIAL: 37 MM HG (ref 35–45)
PERFORMED ON: ABNORMAL
PH ARTERIAL: 7.41 (ref 7.35–7.45)
PLATELET # BLD AUTO: 205 K/UL (ref 130–400)
PO2 ARTERIAL: 72 MM HG (ref 75–108)
POC CHLORIDE: 105 MEQ/L (ref 99–110)
POC CREATININE: 0.7 MG/DL (ref 0.6–1.2)
POC FIO2: 2
POC SAMPLE TYPE: ABNORMAL
POTASSIUM SERPL-SCNC: 3.5 MEQ/L (ref 3.5–5.1)
POTASSIUM SERPL-SCNC: 4 MEQ/L (ref 3.4–4.9)
PROCALCITONIN SERPL IA-MCNC: 0.11 NG/ML (ref 0–0.15)
PROT SERPL-MCNC: 6.8 G/DL (ref 6.3–8)
PROTHROMBIN TIME: 13.9 SEC (ref 12.3–14.9)
RBC # BLD AUTO: 5.21 M/UL (ref 4.2–5.4)
REASON FOR REJECTION: NORMAL
REJECTED TEST: NORMAL
RSV RNA NPH QL NAA+NON-PROBE: NOT DETECTED
RV+EV RNA NPH QL NAA+NON-PROBE: NOT DETECTED
SARS-COV-2 RDRP RESP QL NAA+PROBE: NOT DETECTED
SARS-COV-2 RNA NPH QL NAA+NON-PROBE: DETECTED
SODIUM BLD-SCNC: 142 MEQ/L (ref 136–145)
SODIUM SERPL-SCNC: 141 MEQ/L (ref 135–144)
TCO2 ARTERIAL: 25 MMOL/L (ref 21–32)
TROPONIN T SERPL-MCNC: <0.01 NG/ML (ref 0–0.01)
WBC # BLD AUTO: 11 K/UL (ref 4.8–10.8)

## 2023-07-31 PROCEDURE — 85014 HEMATOCRIT: CPT

## 2023-07-31 PROCEDURE — 82435 ASSAY OF BLOOD CHLORIDE: CPT

## 2023-07-31 PROCEDURE — 99285 EMERGENCY DEPT VISIT HI MDM: CPT

## 2023-07-31 PROCEDURE — 71275 CT ANGIOGRAPHY CHEST: CPT

## 2023-07-31 PROCEDURE — 85730 THROMBOPLASTIN TIME PARTIAL: CPT

## 2023-07-31 PROCEDURE — G0439 PPPS, SUBSEQ VISIT: HCPCS | Performed by: INTERNAL MEDICINE

## 2023-07-31 PROCEDURE — 87040 BLOOD CULTURE FOR BACTERIA: CPT

## 2023-07-31 PROCEDURE — 82803 BLOOD GASES ANY COMBINATION: CPT

## 2023-07-31 PROCEDURE — 84484 ASSAY OF TROPONIN QUANT: CPT

## 2023-07-31 PROCEDURE — 85025 COMPLETE CBC W/AUTO DIFF WBC: CPT

## 2023-07-31 PROCEDURE — 71045 X-RAY EXAM CHEST 1 VIEW: CPT

## 2023-07-31 PROCEDURE — 3075F SYST BP GE 130 - 139MM HG: CPT | Performed by: INTERNAL MEDICINE

## 2023-07-31 PROCEDURE — 82565 ASSAY OF CREATININE: CPT

## 2023-07-31 PROCEDURE — 2580000003 HC RX 258: Performed by: PHYSICIAN ASSISTANT

## 2023-07-31 PROCEDURE — 83605 ASSAY OF LACTIC ACID: CPT

## 2023-07-31 PROCEDURE — 6360000004 HC RX CONTRAST MEDICATION

## 2023-07-31 PROCEDURE — 80053 COMPREHEN METABOLIC PANEL: CPT

## 2023-07-31 PROCEDURE — 0202U NFCT DS 22 TRGT SARS-COV-2: CPT

## 2023-07-31 PROCEDURE — 82550 ASSAY OF CK (CPK): CPT

## 2023-07-31 PROCEDURE — 2700000000 HC OXYGEN THERAPY PER DAY

## 2023-07-31 PROCEDURE — 85610 PROTHROMBIN TIME: CPT

## 2023-07-31 PROCEDURE — 84132 ASSAY OF SERUM POTASSIUM: CPT

## 2023-07-31 PROCEDURE — 6370000000 HC RX 637 (ALT 250 FOR IP): Performed by: PHYSICIAN ASSISTANT

## 2023-07-31 PROCEDURE — 96361 HYDRATE IV INFUSION ADD-ON: CPT

## 2023-07-31 PROCEDURE — 36600 WITHDRAWAL OF ARTERIAL BLOOD: CPT

## 2023-07-31 PROCEDURE — 36415 COLL VENOUS BLD VENIPUNCTURE: CPT

## 2023-07-31 PROCEDURE — 82330 ASSAY OF CALCIUM: CPT

## 2023-07-31 PROCEDURE — 3078F DIAST BP <80 MM HG: CPT | Performed by: INTERNAL MEDICINE

## 2023-07-31 PROCEDURE — 84295 ASSAY OF SERUM SODIUM: CPT

## 2023-07-31 PROCEDURE — 1123F ACP DISCUSS/DSCN MKR DOCD: CPT | Performed by: INTERNAL MEDICINE

## 2023-07-31 PROCEDURE — 96360 HYDRATION IV INFUSION INIT: CPT

## 2023-07-31 PROCEDURE — 84145 PROCALCITONIN (PCT): CPT

## 2023-07-31 PROCEDURE — 70450 CT HEAD/BRAIN W/O DYE: CPT

## 2023-07-31 PROCEDURE — 93005 ELECTROCARDIOGRAM TRACING: CPT

## 2023-07-31 RX ORDER — 0.9 % SODIUM CHLORIDE 0.9 %
500 INTRAVENOUS SOLUTION INTRAVENOUS ONCE
Status: COMPLETED | OUTPATIENT
Start: 2023-07-31 | End: 2023-08-01

## 2023-07-31 RX ORDER — ACETAMINOPHEN 325 MG/1
650 TABLET ORAL EVERY 4 HOURS PRN
COMMUNITY

## 2023-07-31 RX ORDER — MIRTAZAPINE 15 MG/1
7.5 TABLET, FILM COATED ORAL NIGHTLY
COMMUNITY

## 2023-07-31 RX ORDER — 0.9 % SODIUM CHLORIDE 0.9 %
1000 INTRAVENOUS SOLUTION INTRAVENOUS ONCE
Status: COMPLETED | OUTPATIENT
Start: 2023-07-31 | End: 2023-07-31

## 2023-07-31 RX ORDER — DIVALPROEX SODIUM 125 MG/1
125 TABLET, DELAYED RELEASE ORAL 2 TIMES DAILY
COMMUNITY

## 2023-07-31 RX ORDER — ACETAMINOPHEN 500 MG
1000 TABLET ORAL ONCE
Status: COMPLETED | OUTPATIENT
Start: 2023-07-31 | End: 2023-07-31

## 2023-07-31 RX ORDER — BISACODYL 10 MG
10 SUPPOSITORY, RECTAL RECTAL DAILY PRN
COMMUNITY

## 2023-07-31 RX ADMIN — ACETAMINOPHEN 1000 MG: 500 TABLET ORAL at 18:05

## 2023-07-31 RX ADMIN — IOPAMIDOL 75 ML: 612 INJECTION, SOLUTION INTRAVENOUS at 22:52

## 2023-07-31 RX ADMIN — SODIUM CHLORIDE 1000 ML: 9 INJECTION, SOLUTION INTRAVENOUS at 18:04

## 2023-07-31 SDOH — ECONOMIC STABILITY: INCOME INSECURITY: HOW HARD IS IT FOR YOU TO PAY FOR THE VERY BASICS LIKE FOOD, HOUSING, MEDICAL CARE, AND HEATING?: NOT HARD AT ALL

## 2023-07-31 SDOH — ECONOMIC STABILITY: FOOD INSECURITY: WITHIN THE PAST 12 MONTHS, YOU WORRIED THAT YOUR FOOD WOULD RUN OUT BEFORE YOU GOT MONEY TO BUY MORE.: NEVER TRUE

## 2023-07-31 SDOH — ECONOMIC STABILITY: FOOD INSECURITY: WITHIN THE PAST 12 MONTHS, THE FOOD YOU BOUGHT JUST DIDN'T LAST AND YOU DIDN'T HAVE MONEY TO GET MORE.: NEVER TRUE

## 2023-07-31 SDOH — ECONOMIC STABILITY: HOUSING INSECURITY
IN THE LAST 12 MONTHS, WAS THERE A TIME WHEN YOU DID NOT HAVE A STEADY PLACE TO SLEEP OR SLEPT IN A SHELTER (INCLUDING NOW)?: NO

## 2023-07-31 ASSESSMENT — LIFESTYLE VARIABLES
HOW MANY STANDARD DRINKS CONTAINING ALCOHOL DO YOU HAVE ON A TYPICAL DAY: PATIENT DOES NOT DRINK
HOW OFTEN DO YOU HAVE A DRINK CONTAINING ALCOHOL: NEVER
HOW OFTEN DO YOU HAVE A DRINK CONTAINING ALCOHOL: NEVER
HOW MANY STANDARD DRINKS CONTAINING ALCOHOL DO YOU HAVE ON A TYPICAL DAY: PATIENT DOES NOT DRINK

## 2023-07-31 ASSESSMENT — ENCOUNTER SYMPTOMS
COUGH: 1
ABDOMINAL PAIN: 0
COLOR CHANGE: 0
RHINORRHEA: 0
ABDOMINAL DISTENTION: 0
CONSTIPATION: 0
EYE DISCHARGE: 0
SHORTNESS OF BREATH: 0
SORE THROAT: 0

## 2023-07-31 ASSESSMENT — PATIENT HEALTH QUESTIONNAIRE - PHQ9
SUM OF ALL RESPONSES TO PHQ QUESTIONS 1-9: 0
7. TROUBLE CONCENTRATING ON THINGS, SUCH AS READING THE NEWSPAPER OR WATCHING TELEVISION: 0
SUM OF ALL RESPONSES TO PHQ9 QUESTIONS 1 & 2: 0
SUM OF ALL RESPONSES TO PHQ QUESTIONS 1-9: 0
6. FEELING BAD ABOUT YOURSELF - OR THAT YOU ARE A FAILURE OR HAVE LET YOURSELF OR YOUR FAMILY DOWN: 0
2. FEELING DOWN, DEPRESSED OR HOPELESS: 0
10. IF YOU CHECKED OFF ANY PROBLEMS, HOW DIFFICULT HAVE THESE PROBLEMS MADE IT FOR YOU TO DO YOUR WORK, TAKE CARE OF THINGS AT HOME, OR GET ALONG WITH OTHER PEOPLE: 0
9. THOUGHTS THAT YOU WOULD BE BETTER OFF DEAD, OR OF HURTING YOURSELF: 0
3. TROUBLE FALLING OR STAYING ASLEEP: 0
SUM OF ALL RESPONSES TO PHQ QUESTIONS 1-9: 0
1. LITTLE INTEREST OR PLEASURE IN DOING THINGS: 0
SUM OF ALL RESPONSES TO PHQ QUESTIONS 1-9: 0
8. MOVING OR SPEAKING SO SLOWLY THAT OTHER PEOPLE COULD HAVE NOTICED. OR THE OPPOSITE, BEING SO FIGETY OR RESTLESS THAT YOU HAVE BEEN MOVING AROUND A LOT MORE THAN USUAL: 0
5. POOR APPETITE OR OVEREATING: 0
4. FEELING TIRED OR HAVING LITTLE ENERGY: 0

## 2023-07-31 ASSESSMENT — PAIN - FUNCTIONAL ASSESSMENT: PAIN_FUNCTIONAL_ASSESSMENT: NONE - DENIES PAIN

## 2023-07-31 NOTE — PROGRESS NOTES
morning and 50 mg in the evening Yes Real Blades, DO   Nutritional Supplements (ENSURE COMPLETE SHAKE) LIQD Take 1 Can by mouth 3 times daily Strawberry or chocolate flavor Yes Julissa Baeza PA-C   Handicap Placard MISC by Does not apply route Start date:  12/18/2019 Expiration date 12/18/2024  Julissa Baeza PA-C       CareTeam (Including outside providers/suppliers regularly involved in providing care):   Patient Care Team:  Shanel Yi MD as PCP - General (Internal Medicine)  Shanel Yi MD as PCP - Empaneled Provider  Vilma Ray MD as Consulting Physician (Neurology)     Reviewed and updated this visit:  Tobacco  Allergies  Meds  Med Hx  Surg Hx  Soc Hx  Fam Hx      I, Shasha Gallo RN, 7/31/2023, performed the documented evaluation under the direct supervision of the attending physician.

## 2023-07-31 NOTE — ED PROVIDER NOTES
[facility] ED  eMERGENCY dEPARTMENT eNCOUnter      Pt Name: Elbert Waters  MRN: [id number]  [address] 1935  Date of evaluation: 7/31/2023  Provider: KIRBY Alonzo    CHIEF COMPLAINT       Chief Complaint   Patient presents with    Fatigue     Per ems family is co due to pt sleeping more and not ambulating like she normally does pt has no complaints. Pt is a/ox2. Resp even and unlabored, cough noted. HISTORY OF PRESENT ILLNESS   (Location/Symptom, Timing/Onset,Context/Setting, Quality, Duration, Modifying Factors, Severity)  Note limiting factors. Elbert Waters is a 82 y.o. female who presents to the emergency department with complaint per family of decreased mental status, sleeping a lot, fevers, cough. Daughter who is present with patient on arrival to ED, states that when her sister went to check on her today states that she was on responsive at the nursing home, would not talk to them, and was not arousable. On arrival to the ED today she is awake, alert, talkative, but does have a cough and dry oral mucosa. Per daughter no recent illness, no ill contacts that she is aware of, no changes in medication. No changes in appetite, and no recent falls or injuries. Past medical history per chart, gastric reflux, depression, chronic back pain, hypertension, glaucoma, polycythemia vera, anxiety, arthritis, dementia. HPI    NursingNotes were reviewed. REVIEW OF SYSTEMS    (2-9 systems for level 4, 10 or more for level 5)     Review of Systems   Constitutional:  Positive for fatigue and fever. Negative for activity change and appetite change. HENT:  Negative for congestion, ear discharge, ear pain, nosebleeds, rhinorrhea and sore throat. Eyes:  Negative for discharge. Respiratory:  Positive for cough. Negative for shortness of breath. Cardiovascular:  Negative for chest pain, palpitations and leg swelling.    Gastrointestinal:  Negative for abdominal distention,

## 2023-07-31 NOTE — PATIENT INSTRUCTIONS
Personalized Preventive Plan for Matthias Brooklyn - 7/31/2023  Medicare offers a range of preventive health benefits. Some of the tests and screenings are paid in full while other may be subject to a deductible, co-insurance, and/or copay. Some of these benefits include a comprehensive review of your medical history including lifestyle, illnesses that may run in your family, and various assessments and screenings as appropriate. After reviewing your medical record and screening and assessments performed today your provider may have ordered immunizations, labs, imaging, and/or referrals for you. A list of these orders (if applicable) as well as your Preventive Care list are included within your After Visit Summary for your review. Other Preventive Recommendations:    A preventive eye exam performed by an eye specialist is recommended every 1-2 years to screen for glaucoma; cataracts, macular degeneration, and other eye disorders. A preventive dental visit is recommended every 6 months. Try to get at least 150 minutes of exercise per week or 10,000 steps per day on a pedometer . Order or download the FREE \"Exercise & Physical Activity: Your Everyday Guide\" from [employer]. Call 3-781.112.1180 or search [employer] online. You need 9056-5956 mg of calcium and 4841-2784 IU of vitamin D per day. It is possible to meet your calcium requirement with diet alone, but a vitamin D supplement is usually necessary to meet this goal.  When exposed to the sun, use a sunscreen that protects against both UVA and UVB radiation with an SPF of 30 or greater. Reapply every 2 to 3 hours or after sweating, drying off with a towel, or swimming. Always wear a seat belt when traveling in a car. Always wear a helmet when riding a bicycle or motorcycle.

## 2023-08-01 ENCOUNTER — APPOINTMENT (OUTPATIENT)
Age: 88
End: 2023-08-01
Attending: INTERNAL MEDICINE
Payer: MEDICARE

## 2023-08-01 PROBLEM — U07.1 COVID-19 VIRUS INFECTION: Status: ACTIVE | Noted: 2023-08-01

## 2023-08-01 PROBLEM — M62.82 RHABDOMYOLYSIS: Status: ACTIVE | Noted: 2023-08-01

## 2023-08-01 PROBLEM — N81.3 UTERINE PROCIDENTIA: Status: ACTIVE | Noted: 2023-08-01

## 2023-08-01 LAB
ANION GAP SERPL CALCULATED.3IONS-SCNC: 17 MEQ/L (ref 9–15)
BASOPHILS # BLD: 0 K/UL (ref 0–0.2)
BASOPHILS NFR BLD: 0.2 %
BUN SERPL-MCNC: 20 MG/DL (ref 8–23)
CALCIUM SERPL-MCNC: 8.4 MG/DL (ref 8.5–9.9)
CHLORIDE SERPL-SCNC: 105 MEQ/L (ref 95–107)
CO2 SERPL-SCNC: 17 MEQ/L (ref 20–31)
CREAT SERPL-MCNC: 0.56 MG/DL (ref 0.5–0.9)
EKG ATRIAL RATE: 315 BPM
EKG ATRIAL RATE: 337 BPM
EKG ATRIAL RATE: 87 BPM
EKG P AXIS: 64 DEGREES
EKG P-R INTERVAL: 136 MS
EKG Q-T INTERVAL: 284 MS
EKG Q-T INTERVAL: 288 MS
EKG Q-T INTERVAL: 346 MS
EKG QRS DURATION: 72 MS
EKG QRS DURATION: 74 MS
EKG QRS DURATION: 80 MS
EKG QTC CALCULATION (BAZETT): 416 MS
EKG QTC CALCULATION (BAZETT): 441 MS
EKG QTC CALCULATION (BAZETT): 469 MS
EKG R AXIS: 33 DEGREES
EKG R AXIS: 47 DEGREES
EKG R AXIS: 56 DEGREES
EKG T AXIS: 43 DEGREES
EKG T AXIS: 63 DEGREES
EKG T AXIS: 67 DEGREES
EKG VENTRICULAR RATE: 145 BPM
EKG VENTRICULAR RATE: 160 BPM
EKG VENTRICULAR RATE: 87 BPM
EOSINOPHIL # BLD: 0 K/UL (ref 0–0.7)
EOSINOPHIL NFR BLD: 0 %
ERYTHROCYTE [DISTWIDTH] IN BLOOD BY AUTOMATED COUNT: 14.9 % (ref 11.5–14.5)
GLUCOSE SERPL-MCNC: 71 MG/DL (ref 70–99)
HCT VFR BLD AUTO: 48.7 % (ref 37–47)
HGB BLD-MCNC: 16.1 G/DL (ref 12–16)
LDH SERPL-CCNC: 281 U/L (ref 135–214)
LYMPHOCYTES # BLD: 1.2 K/UL (ref 1–4.8)
LYMPHOCYTES NFR BLD: 12.8 %
MAGNESIUM SERPL-MCNC: 1.9 MG/DL (ref 1.7–2.4)
MCH RBC QN AUTO: 31.7 PG (ref 27–31.3)
MCHC RBC AUTO-ENTMCNC: 33.1 % (ref 33–37)
MCV RBC AUTO: 95.7 FL (ref 79.4–94.8)
MONOCYTES # BLD: 1.2 K/UL (ref 0.2–0.8)
MONOCYTES NFR BLD: 13 %
NEUTROPHILS # BLD: 6.8 K/UL (ref 1.4–6.5)
NEUTS SEG NFR BLD: 74 %
PLATELET # BLD AUTO: 190 K/UL (ref 130–400)
POTASSIUM SERPL-SCNC: 4.4 MEQ/L (ref 3.4–4.9)
RBC # BLD AUTO: 5.09 M/UL (ref 4.2–5.4)
SODIUM SERPL-SCNC: 139 MEQ/L (ref 135–144)
TSH REFLEX: 2.04 UIU/ML (ref 0.44–3.86)
VITAMIN D 25-HYDROXY: 29.6 NG/ML
WBC # BLD AUTO: 9.2 K/UL (ref 4.8–10.8)

## 2023-08-01 PROCEDURE — APPSS45 APP SPLIT SHARED TIME 31-45 MINUTES: Performed by: PHYSICIAN ASSISTANT

## 2023-08-01 PROCEDURE — 83735 ASSAY OF MAGNESIUM: CPT

## 2023-08-01 PROCEDURE — 93010 ELECTROCARDIOGRAM REPORT: CPT | Performed by: INTERNAL MEDICINE

## 2023-08-01 PROCEDURE — 6360000002 HC RX W HCPCS: Performed by: INTERNAL MEDICINE

## 2023-08-01 PROCEDURE — 84443 ASSAY THYROID STIM HORMONE: CPT

## 2023-08-01 PROCEDURE — 6370000000 HC RX 637 (ALT 250 FOR IP): Performed by: INTERNAL MEDICINE

## 2023-08-01 PROCEDURE — 83615 LACTATE (LD) (LDH) ENZYME: CPT

## 2023-08-01 PROCEDURE — 80048 BASIC METABOLIC PNL TOTAL CA: CPT

## 2023-08-01 PROCEDURE — 93005 ELECTROCARDIOGRAM TRACING: CPT | Performed by: INTERNAL MEDICINE

## 2023-08-01 PROCEDURE — 6370000000 HC RX 637 (ALT 250 FOR IP): Performed by: PHYSICIAN ASSISTANT

## 2023-08-01 PROCEDURE — 2580000003 HC RX 258

## 2023-08-01 PROCEDURE — 36415 COLL VENOUS BLD VENIPUNCTURE: CPT

## 2023-08-01 PROCEDURE — 2580000003 HC RX 258: Performed by: INTERNAL MEDICINE

## 2023-08-01 PROCEDURE — 85025 COMPLETE CBC W/AUTO DIFF WBC: CPT

## 2023-08-01 PROCEDURE — 2060000000 HC ICU INTERMEDIATE R&B

## 2023-08-01 PROCEDURE — 99222 1ST HOSP IP/OBS MODERATE 55: CPT | Performed by: INTERNAL MEDICINE

## 2023-08-01 PROCEDURE — 2500000003 HC RX 250 WO HCPCS: Performed by: INTERNAL MEDICINE

## 2023-08-01 PROCEDURE — 99221 1ST HOSP IP/OBS SF/LOW 40: CPT | Performed by: OBSTETRICS & GYNECOLOGY

## 2023-08-01 PROCEDURE — 82306 VITAMIN D 25 HYDROXY: CPT

## 2023-08-01 RX ORDER — PANTOPRAZOLE SODIUM 40 MG/1
40 TABLET, DELAYED RELEASE ORAL
Status: DISCONTINUED | OUTPATIENT
Start: 2023-08-01 | End: 2023-08-04 | Stop reason: HOSPADM

## 2023-08-01 RX ORDER — SODIUM CHLORIDE, SODIUM LACTATE, POTASSIUM CHLORIDE, CALCIUM CHLORIDE 600; 310; 30; 20 MG/100ML; MG/100ML; MG/100ML; MG/100ML
INJECTION, SOLUTION INTRAVENOUS CONTINUOUS
Status: DISCONTINUED | OUTPATIENT
Start: 2023-08-01 | End: 2023-08-01

## 2023-08-01 RX ORDER — METOPROLOL TARTRATE 5 MG/5ML
5 INJECTION INTRAVENOUS EVERY 5 MIN PRN
Status: DISCONTINUED | OUTPATIENT
Start: 2023-08-01 | End: 2023-08-01 | Stop reason: SDUPTHER

## 2023-08-01 RX ORDER — SODIUM CHLORIDE 0.9 % (FLUSH) 0.9 %
5-40 SYRINGE (ML) INJECTION EVERY 12 HOURS SCHEDULED
Status: DISCONTINUED | OUTPATIENT
Start: 2023-08-01 | End: 2023-08-04 | Stop reason: HOSPADM

## 2023-08-01 RX ORDER — POLYETHYLENE GLYCOL 3350 17 G/17G
17 POWDER, FOR SOLUTION ORAL DAILY PRN
Status: DISCONTINUED | OUTPATIENT
Start: 2023-08-01 | End: 2023-08-04 | Stop reason: HOSPADM

## 2023-08-01 RX ORDER — QUETIAPINE FUMARATE 50 MG/1
25 TABLET, FILM COATED ORAL 2 TIMES DAILY
Status: DISCONTINUED | OUTPATIENT
Start: 2023-08-01 | End: 2023-08-01

## 2023-08-01 RX ORDER — HALOPERIDOL 5 MG/ML
2 INJECTION INTRAMUSCULAR EVERY 4 HOURS PRN
Status: DISCONTINUED | OUTPATIENT
Start: 2023-08-01 | End: 2023-08-04 | Stop reason: HOSPADM

## 2023-08-01 RX ORDER — ONDANSETRON 2 MG/ML
4 INJECTION INTRAMUSCULAR; INTRAVENOUS EVERY 6 HOURS PRN
Status: DISCONTINUED | OUTPATIENT
Start: 2023-08-01 | End: 2023-08-04 | Stop reason: HOSPADM

## 2023-08-01 RX ORDER — DIVALPROEX SODIUM 125 MG/1
125 TABLET, DELAYED RELEASE ORAL 2 TIMES DAILY
Status: DISCONTINUED | OUTPATIENT
Start: 2023-08-01 | End: 2023-08-04 | Stop reason: HOSPADM

## 2023-08-01 RX ORDER — DILTIAZEM HYDROCHLORIDE 60 MG/1
30 TABLET, FILM COATED ORAL EVERY 6 HOURS SCHEDULED
Status: DISCONTINUED | OUTPATIENT
Start: 2023-08-01 | End: 2023-08-02

## 2023-08-01 RX ORDER — MIRTAZAPINE 15 MG/1
7.5 TABLET, FILM COATED ORAL NIGHTLY
Status: DISCONTINUED | OUTPATIENT
Start: 2023-08-01 | End: 2023-08-01

## 2023-08-01 RX ORDER — SODIUM CHLORIDE, SODIUM LACTATE, POTASSIUM CHLORIDE, AND CALCIUM CHLORIDE .6; .31; .03; .02 G/100ML; G/100ML; G/100ML; G/100ML
500 INJECTION, SOLUTION INTRAVENOUS ONCE
Status: DISCONTINUED | OUTPATIENT
Start: 2023-08-01 | End: 2023-08-04 | Stop reason: HOSPADM

## 2023-08-01 RX ORDER — ACETAMINOPHEN 650 MG/1
650 SUPPOSITORY RECTAL EVERY 6 HOURS PRN
Status: DISCONTINUED | OUTPATIENT
Start: 2023-08-01 | End: 2023-08-04 | Stop reason: HOSPADM

## 2023-08-01 RX ORDER — SODIUM CHLORIDE 0.9 % (FLUSH) 0.9 %
5-40 SYRINGE (ML) INJECTION PRN
Status: DISCONTINUED | OUTPATIENT
Start: 2023-08-01 | End: 2023-08-04 | Stop reason: HOSPADM

## 2023-08-01 RX ORDER — METOPROLOL TARTRATE 5 MG/5ML
5 INJECTION INTRAVENOUS EVERY 6 HOURS PRN
Status: DISCONTINUED | OUTPATIENT
Start: 2023-08-01 | End: 2023-08-04 | Stop reason: HOSPADM

## 2023-08-01 RX ORDER — QUETIAPINE FUMARATE 50 MG/1
25 TABLET, FILM COATED ORAL 2 TIMES DAILY
Status: DISCONTINUED | OUTPATIENT
Start: 2023-08-01 | End: 2023-08-04 | Stop reason: HOSPADM

## 2023-08-01 RX ORDER — ENOXAPARIN SODIUM 100 MG/ML
30 INJECTION SUBCUTANEOUS 2 TIMES DAILY
Status: DISCONTINUED | OUTPATIENT
Start: 2023-08-01 | End: 2023-08-01

## 2023-08-01 RX ORDER — DIGOXIN 0.25 MG/ML
250 INJECTION INTRAMUSCULAR; INTRAVENOUS ONCE
Status: COMPLETED | OUTPATIENT
Start: 2023-08-01 | End: 2023-08-01

## 2023-08-01 RX ORDER — ONDANSETRON 4 MG/1
4 TABLET, ORALLY DISINTEGRATING ORAL EVERY 8 HOURS PRN
Status: DISCONTINUED | OUTPATIENT
Start: 2023-08-01 | End: 2023-08-04 | Stop reason: HOSPADM

## 2023-08-01 RX ORDER — METOPROLOL TARTRATE 5 MG/5ML
5 INJECTION INTRAVENOUS EVERY 5 MIN PRN
Status: COMPLETED | OUTPATIENT
Start: 2023-08-01 | End: 2023-08-01

## 2023-08-01 RX ORDER — ENOXAPARIN SODIUM 100 MG/ML
1 INJECTION SUBCUTANEOUS 2 TIMES DAILY
Status: DISCONTINUED | OUTPATIENT
Start: 2023-08-01 | End: 2023-08-02

## 2023-08-01 RX ORDER — ACETAMINOPHEN 325 MG/1
650 TABLET ORAL EVERY 6 HOURS PRN
Status: DISCONTINUED | OUTPATIENT
Start: 2023-08-01 | End: 2023-08-04 | Stop reason: HOSPADM

## 2023-08-01 RX ORDER — MIRTAZAPINE 15 MG/1
7.5 TABLET, FILM COATED ORAL NIGHTLY
Status: DISCONTINUED | OUTPATIENT
Start: 2023-08-01 | End: 2023-08-04 | Stop reason: HOSPADM

## 2023-08-01 RX ORDER — LATANOPROST 50 UG/ML
1 SOLUTION/ DROPS OPHTHALMIC NIGHTLY
Status: DISCONTINUED | OUTPATIENT
Start: 2023-08-01 | End: 2023-08-04 | Stop reason: HOSPADM

## 2023-08-01 RX ORDER — TAMSULOSIN HYDROCHLORIDE 0.4 MG/1
0.4 CAPSULE ORAL DAILY
Status: DISCONTINUED | OUTPATIENT
Start: 2023-08-01 | End: 2023-08-03

## 2023-08-01 RX ORDER — SODIUM CHLORIDE 9 MG/ML
INJECTION, SOLUTION INTRAVENOUS PRN
Status: DISCONTINUED | OUTPATIENT
Start: 2023-08-01 | End: 2023-08-04 | Stop reason: HOSPADM

## 2023-08-01 RX ADMIN — METOPROLOL TARTRATE 5 MG: 5 INJECTION INTRAVENOUS at 08:02

## 2023-08-01 RX ADMIN — METOPROLOL TARTRATE 25 MG: 25 TABLET, FILM COATED ORAL at 08:21

## 2023-08-01 RX ADMIN — DILTIAZEM HYDROCHLORIDE 30 MG: 60 TABLET, FILM COATED ORAL at 13:56

## 2023-08-01 RX ADMIN — TAMSULOSIN HYDROCHLORIDE 0.4 MG: 0.4 CAPSULE ORAL at 18:22

## 2023-08-01 RX ADMIN — DILTIAZEM HYDROCHLORIDE 30 MG: 30 TABLET, FILM COATED ORAL at 09:35

## 2023-08-01 RX ADMIN — METOPROLOL TARTRATE 5 MG: 5 INJECTION INTRAVENOUS at 07:56

## 2023-08-01 RX ADMIN — DILTIAZEM HYDROCHLORIDE 30 MG: 60 TABLET, FILM COATED ORAL at 16:57

## 2023-08-01 RX ADMIN — SODIUM CHLORIDE, POTASSIUM CHLORIDE, SODIUM LACTATE AND CALCIUM CHLORIDE: 600; 310; 30; 20 INJECTION, SOLUTION INTRAVENOUS at 01:10

## 2023-08-01 RX ADMIN — SODIUM CHLORIDE, SODIUM LACTATE, POTASSIUM CHLORIDE, AND CALCIUM CHLORIDE 500 ML: .6; .31; .03; .02 INJECTION, SOLUTION INTRAVENOUS at 07:50

## 2023-08-01 RX ADMIN — DIGOXIN 250 MCG: 0.25 INJECTION INTRAMUSCULAR; INTRAVENOUS at 10:07

## 2023-08-01 RX ADMIN — SODIUM CHLORIDE 500 ML: 9 INJECTION, SOLUTION INTRAVENOUS at 01:10

## 2023-08-01 RX ADMIN — HALOPERIDOL LACTATE 2 MG: 5 INJECTION, SOLUTION INTRAMUSCULAR at 22:25

## 2023-08-01 RX ADMIN — QUETIAPINE FUMARATE 25 MG: 50 TABLET ORAL at 16:57

## 2023-08-01 RX ADMIN — MIRTAZAPINE 7.5 MG: 15 TABLET, FILM COATED ORAL at 16:57

## 2023-08-01 RX ADMIN — METOPROLOL TARTRATE 5 MG: 5 INJECTION INTRAVENOUS at 07:50

## 2023-08-01 RX ADMIN — ENOXAPARIN SODIUM 30 MG: 100 INJECTION SUBCUTANEOUS at 08:14

## 2023-08-01 ASSESSMENT — ENCOUNTER SYMPTOMS
EYES NEGATIVE: 1
ABDOMINAL PAIN: 0
NAUSEA: 0
SHORTNESS OF BREATH: 0
VOMITING: 0
CHEST TIGHTNESS: 0
COUGH: 1
GASTROINTESTINAL NEGATIVE: 1

## 2023-08-01 NOTE — CONSULTS
To the referring physician:    Pt with transfer of care to 1W due to change in cardiac status. Gynecology consult can be resubmitted when pt is stabilized and transferred to regular room and medically stable. Please contact [phone] when consult is needed.      Thank you for your consult request  Cynthia Boland MD

## 2023-08-01 NOTE — PROGRESS NOTES
Physical Therapy  Facility/Department: [facility] MED SURG Y038/W470-14  Interdisciplinary Communication Note      NAME: Nereyda Hogue    : 1935 (82 y.o.)  MRN: [id number]    Account: [de-identified]  Gender: female      Pt transferred to [address] following a rapid response this AM for Afib with RVR. D/t this acute change in medical status, DC current PT orders. Will await new PT evaluation and tx orders when pt is medically appropriate and stable for OOB activity.  Thank you for your referral.    Tiny Crowder, PT, 23 at 1:09 PM

## 2023-08-01 NOTE — PROGRESS NOTES
[facility] Occupational Therapy Department  Change in Status Communication Form      To the referring physician:    Due to an acute change in this patient's medical status, new Occupational Therapy Eval and Treatment orders are required. Pt. has transferred to 1W for change in cardiac status following rapid response this AM. Please reorder when pt. is medically stable to resume OOB activity, as appropriate. We thank you for your referral.     Narcisa Guillen OT Department.      Electronically signed by UGO Morales on 8/1/23 at 1:10 PM EDT

## 2023-08-01 NOTE — ED NOTES
Trinity Wiggins remains in place and attached to suction.  No urine noted in container, pt is dry     Esthela Laguna RN  07/31/23 [id number]

## 2023-08-01 NOTE — CARE COORDINATION
Case Management Assessment  Initial Evaluation    Date/Time of Evaluation: 8/1/2023 10:02 AM  Assessment Completed by: Carolina Burns RN    If patient is discharged prior to next notation, then this note serves as note for discharge by case management. Patient Name: Elbert Waters                   YOB: 1935  Diagnosis: Rhabdomyolysis [M62.82]  Tachycardia [R00.0]  Hypoxia [R09.02]  Dementia, unspecified dementia severity, unspecified dementia type, unspecified whether behavioral, psychotic, or mood disturbance or anxiety ([address]) [F03.90]  COVID-19 virus infection [U07.1]                   Date / Time: 7/31/2023  5:03 PM    Patient Admission Status: Inpatient   Readmission Risk (Low < 19, Mod (19-27), High > 27): Readmission Risk Score: 14.1    Current PCP: Amrita Albarran MD  PCP verified by CM? Yes    Chart Reviewed: Yes      History Provided by: Child/Family  Patient Orientation: Other (see comment) (PATIENT CONFUSED AT THIS TIME)    Patient Cognition: Other (see comment) (PATIENT CONFUSED AT THIS TIME.)    Hospitalization in the last 30 days (Readmission):  No    If yes, Readmission Assessment in CM Navigator will be completed. Advance Directives:      Code Status: DNR-CCA   Patient's Primary Decision Maker is: Named in [facility]    Primary Decision Maker: Carlene Garrett - Child - [phone]    Secondary Decision Maker: Tiki Bruce Child - [phone]    Discharge Planning:    Patient lives with:  Other (Comment) (kaylyn higuera) Type of Home: Long-Term Care  Primary Care Giver: Family  Patient Support Systems include: Children   Current Financial resources:    Current community resources:    Current services prior to admission: None            Current DME:              Type of Home Care services:  None, Skilled Therapy    ADLS  Prior functional level: Assistance with the following:, Housework, Cooking, Bathing, Shopping  Current functional level: Assistance with the

## 2023-08-01 NOTE — CARE COORDINATION
SPOKE WITH PATIENT'S DAUGHTER, WINSOME. REVIEW OF PG 1 IMM. QUESTIONS ANSWERED. VERBAL ACKNOWLEDGEMENT. COPY PACED IN ROOM. PATIENT'S DAUGHTER AGREES TO PATIENT RETURNING TO [facility] AT D/C.

## 2023-08-01 NOTE — PROGRESS NOTES
Pt arrived to unit at 0153. VSS pt placed on tele. Avasys at bedside for impulsiveness. MD notified of pt's arrival. No answer at long term facility to review meds. Will try again. Pt has what appears to be a prolapsed uterus, open area on prolapsed tissue. MD notified in unit. See consult orders.

## 2023-08-01 NOTE — PROGRESS NOTES
Physician Progress Note    8/1/2023   8:38 AM    Name:  Keon Marino  MRN:    [id number]      Day: 0     Admit Date: 7/31/2023  5:03 PM  PCP: Lou Corey MD    Code Status:  DNR-CCA    Subjective:     Rapid response called for atrial fibrillation with RVR with heart rate ranging up to 170 and 180. This has been going on for most of the night. The patient is asymptomatic-she denies lightheadedness, dizziness, chest pain, palpitations, dyspnea.     Current Facility-Administered Medications   Medication Dose Route Frequency Provider Last Rate Last Admin    sodium chloride flush 0.9 % injection 5-40 mL  5-40 mL IntraVENous 2 times per day Duwaine Darian, DO        sodium chloride flush 0.9 % injection 5-40 mL  5-40 mL IntraVENous PRN Duwaine Darian, DO        0.9 % sodium chloride infusion   IntraVENous PRN Duwaine Darian, DO        enoxaparin Sodium (LOVENOX) injection 30 mg  30 mg SubCUTAneous BID Duwaine Darian, DO   30 mg at 08/01/23 0814    ondansetron (ZOFRAN-ODT) disintegrating tablet 4 mg  4 mg Oral Q8H PRN Duwaine Darian, DO        Or    ondansetron (ZOFRAN) injection 4 mg  4 mg IntraVENous Q6H PRN Duwaine Darian, DO        polyethylene glycol (GLYCOLAX) packet 17 g  17 g Oral Daily PRN Duwaine Darian, DO        acetaminophen (TYLENOL) tablet 650 mg  650 mg Oral Q6H PRN Duwaine Darian, DO        Or    acetaminophen (TYLENOL) suppository 650 mg  650 mg Rectal Q6H PRN Duwaine Darian, DO        lactated ringers bolus bolus 500 mL  500 mL IntraVENous Once Sid Peterson, DO        metoprolol (LOPRESSOR) injection 5 mg  5 mg IntraVENous Q5 Min PRN Sid Legato, DO        metoprolol tartrate (LOPRESSOR) tablet 25 mg  25 mg Oral BID Sid Fragaato, DO   25 mg at 08/01/23 3766    digoxin (LANOXIN) injection 250 mcg  250 mcg IntraVENous Once Eddie R Miguel Angel, DO        dilTIAZem (CARDIZEM) tablet 30 mg  30 mg Oral 4 times per day Sid Peterson, DO           Physical Examination:      Vitals:  BP (!) 126/93   Pulse

## 2023-08-01 NOTE — ACP (ADVANCE CARE PLANNING)
Advance Care Planning   Healthcare Decision Maker:    Primary Decision Maker: Tim Arevalo Child - [phone]    Secondary Decision Maker: Eri Дмитрий - Child - [phone]    Click here to complete Healthcare Decision Makers including selection of the Healthcare Decision Maker Relationship (ie \"Primary\").

## 2023-08-01 NOTE — PROGRESS NOTES
Physical Therapy Missed Treatment   Facility/Department: [facility] MED SURG W864/[id number]    NAME: Millicent Valadez    : 1935 (82 y.o.)  MRN: [id number]    Account: [de-identified]  Gender: female      PT evaluation and treatment orders received. Chart reviewed. Hold PT evaluation at this time per RN. Pt with Afib with RVR. Will follow and attempt PT evaluation again at earliest availability.        Tabatha Rios, PT, 23 at 8:28 AM

## 2023-08-01 NOTE — PROGRESS NOTES
Assumed care of patient at 0730. Rapid response called shortly thereafter for Afib with RVR in the 150-180s for most of the night per tele strip. See Rapid documentation. Pt medicated with Iv lopressor x 3 per order, hr still sustaining 140-160s. Dr. Tammie Rivera aware, new order for cardizem and digoxin received. Medicated pt per emar, heart rate 140s. Dr. Maverick Kat made aware per request of Dr. Tammie Rivera. Mohan Watt PA to floor, order for cardizem gtt and patient to transfer to [address] received. Patient to go to 176. Report called Monae Manning.

## 2023-08-01 NOTE — CARE COORDINATION
SPOKE WITH CLARE GALEANO [facility], PATIENT IS FROM [facility] AND WILL NOT NEED PRE CERT TO RETURN.

## 2023-08-01 NOTE — ED NOTES
RN was unable to obtain urine spec via straight cath d/t pt anatomy.  [place] Colonel was placed      Liliana Jackson RN  07/31/23 2015

## 2023-08-01 NOTE — CONSULTS
K 4.4 08/01/2023 07:59 AM     08/01/2023 07:59 AM    CO2 17 08/01/2023 07:59 AM    BUN 20 08/01/2023 07:59 AM    CREATININE 0.56 08/01/2023 07:59 AM    GFRAA >60.0 05/13/2022 10:14 AM    LABGLOM >60.0 08/01/2023 07:59 AM    LABGLOM 80 07/12/2023 12:00 AM    GLUCOSE 71 08/01/2023 07:59 AM    GLUCOSE 107 05/07/2012 11:55 AM    PROT 6.8 07/31/2023 08:35 PM    LABALBU 4.1 07/31/2023 08:35 PM    LABALBU 4.6 05/05/2012 07:40 PM    CALCIUM 8.4 08/01/2023 07:59 AM    BILITOT 0.8 07/31/2023 08:35 PM    ALKPHOS 77 07/31/2023 08:35 PM    AST 41 07/31/2023 08:35 PM    ALT 15 07/31/2023 08:35 PM     BMP:    Lab Results   Component Value Date/Time     08/01/2023 07:59 AM    K 4.4 08/01/2023 07:59 AM     08/01/2023 07:59 AM    CO2 17 08/01/2023 07:59 AM    BUN 20 08/01/2023 07:59 AM    LABALBU 4.1 07/31/2023 08:35 PM    LABALBU 4.6 05/05/2012 07:40 PM    CREATININE 0.56 08/01/2023 07:59 AM    CALCIUM 8.4 08/01/2023 07:59 AM    GFRAA >60.0 05/13/2022 10:14 AM    LABGLOM >60.0 08/01/2023 07:59 AM    LABGLOM 80 07/12/2023 12:00 AM    GLUCOSE 71 08/01/2023 07:59 AM    GLUCOSE 107 05/07/2012 11:55 AM     Magnesium:    Lab Results   Component Value Date/Time    MG 1.9 08/01/2023 07:59 AM     Troponin:    Lab Results   Component Value Date/Time    TROPONINI <0.010 07/31/2023 08:35 PM       Radiology:  CT Head W/O Contrast    Result Date: 7/31/2023  EXAMINATION: CT OF THE HEAD WITHOUT CONTRAST  7/31/2023 6:43 pm TECHNIQUE: CT of the head was performed without the administration of intravenous contrast. Automated exposure control, iterative reconstruction, and/or weight based adjustment of the mA/kV was utilized to reduce the radiation dose to as low as reasonably achievable. COMPARISON: None. HISTORY: ORDERING SYSTEM PROVIDED HISTORY: confusion TECHNOLOGIST PROVIDED HISTORY: Reason for exam:->confusion Has a \"code stroke\" or \"stroke alert\" been called? ->No Decision Support Exception - unselect if not a suspected or homogeneous in appearance. Vascular calcifications seen within the thoracic aorta. Coronary artery calcifications identified. Cardiac chambers are within normal limits. No pericardial effusion. The heart and pericardium demonstrate no acute abnormality. There is no acute abnormality of the thoracic aorta. Lungs/pleura: The lungs are without acute process. Underlying chronic and emphysematous changes seen lung field bilaterally. There is scarring at the lung apices. Minimal atelectatic changes identified at the lung bases bilaterally. There is bibasilar and biapical pleural thickening and scarring. Bronchial wall thickening seen diffusely throughout the perihilar regions bilaterally suggesting a nonspecific bronchiolitis. No focal consolidation or pulmonary edema. No evidence of pleural effusion or pneumothorax. Upper Abdomen: Limited images of the upper abdomen reveal a large hiatal hernia. Soft Tissues/Bones: Multilevel degenerative changes identified diffusely throughout the thoracic spine with kyphoplasty involving T11 and T12. No evidence of pulmonary embolism. Chronic changes seen throughout the lung fields bilaterally. Biapical bibasilar pleural thickening and scarring with bronchial wall thickening in the perihilar regions to suggest a nonspecific bronchiolitis. Large hiatal hernia identified. XR CHEST PORTABLE    Result Date: 7/31/2023  EXAMINATION: ONE XRAY VIEW OF THE CHEST 7/31/2023 5:46 pm COMPARISON: 07/01/2023 HISTORY: ORDERING SYSTEM PROVIDED HISTORY: cough, fever TECHNOLOGIST PROVIDED HISTORY: Reason for exam:->cough, fever What reading provider will be dictating this exam?->CRC FINDINGS: The lungs are without acute focal process. There is no effusion or pneumothorax. The cardiomediastinal silhouette is without acute process. The osseous structures are without acute process. No acute process. Stress test 11/20/15 at [facility]:  CONCLUSIONS:    1.  Perfusion study: Normal

## 2023-08-01 NOTE — ED NOTES
Per daughter Beena Mei, she was called and updated to bed assignment. Beena Mei advised to call floor for any further questions.       Dominguez Dominguez RN  08/01/23 5172

## 2023-08-01 NOTE — CONSULTS
Infectious Disease     Patient Name: Rain Grant  Date: 8/1/2023  YOB: 1935  Medical Record Number: [id number]        COVID-19 infection      History of Present Illness:  Past medical history anxiety arthritis depression gastroesophageal reflux disease glaucoma hypertension back pain polycythemia vera osteoporosis dementia urinary tract infection      Patient transferred from nursing home increasing fatigue  Found to have elevated CK at 156 respiratory antigen panel positive for COVID-19  History of vaccination  Study 90% on room air placed on O2 2 L  No fevers chills sweats cough no shortness of breath    Chest x-ray and CT scan show nonspecific patchy infiltrate      8/1/2023      Narrative & Impression  EXAMINATION:  CTA OF THE CHEST WITH AND WITHOUT CONTRAST 7/31/2023 10:51 pm     TECHNIQUE:  CTA of the chest was performed before and after the administration of  intravenous contrast.  Multiplanar reformatted images are provided for  review. MIP images are provided for review. Automated exposure control,  iterative reconstruction, and/or weight based adjustment of the mA/kV was  utilized to reduce the radiation dose to as low as reasonably achievable. COMPARISON:  None. HISTORY:  ORDERING SYSTEM PROVIDED HISTORY: pe  TECHNOLOGIST PROVIDED HISTORY:  Reason for exam:->pe  Decision Support Exception - unselect if not a suspected or confirmed  emergency medical condition->Emergency Medical Condition (MA)  What reading provider will be dictating this exam?->CRC     FINDINGS:  Pulmonary Arteries: Pulmonary arteries are adequately opacified for  evaluation. No evidence of intraluminal filling defect to suggest pulmonary  embolism. Main pulmonary artery is normal in caliber. Mediastinum: No evidence of mediastinal lymphadenopathy. Thyroid is  homogeneous in appearance. Vascular calcifications seen within the thoracic  aorta. Coronary artery calcifications identified.   Cardiac chambers or antibiotics

## 2023-08-01 NOTE — CONSULTS
Decision Support Exception - unselect if not a suspected or confirmed emergency medical condition->Emergency Medical Condition (MA) What reading provider will be dictating this exam?->CRC FINDINGS: BRAIN/VENTRICLES: There is no acute intracranial hemorrhage, mass effect or midline shift. No abnormal extra-axial fluid collection. The gray-white differentiation is maintained without evidence of an acute infarct. There is no evidence of hydrocephalus. Periventricular white matter changes consistent chronic microvascular disease. ORBITS: The visualized portion of the orbits demonstrate no acute abnormality. SINUSES: The visualized paranasal sinuses and mastoid air cells demonstrate no acute abnormality. SOFT TISSUES/SKULL:  No acute abnormality of the visualized skull or soft tissues. No acute intracranial abnormality. Periventricular white matter changes consistent with chronic microvascular disease. CTA CHEST W WO CONTRAST    Result Date: 8/1/2023  EXAMINATION: CTA OF THE CHEST WITH AND WITHOUT CONTRAST 7/31/2023 10:51 pm TECHNIQUE: CTA of the chest was performed before and after the administration of intravenous contrast.  Multiplanar reformatted images are provided for review. MIP images are provided for review. Automated exposure control, iterative reconstruction, and/or weight based adjustment of the mA/kV was utilized to reduce the radiation dose to as low as reasonably achievable. COMPARISON: None. HISTORY: ORDERING SYSTEM PROVIDED HISTORY: pe TECHNOLOGIST PROVIDED HISTORY: Reason for exam:->pe Decision Support Exception - unselect if not a suspected or confirmed emergency medical condition->Emergency Medical Condition (MA) What reading provider will be dictating this exam?->CRC FINDINGS: Pulmonary Arteries: Pulmonary arteries are adequately opacified for evaluation. No evidence of intraluminal filling defect to suggest pulmonary embolism. Main pulmonary artery is normal in caliber.  Mediastinum: No evidence of mediastinal lymphadenopathy. Thyroid is homogeneous in appearance. Vascular calcifications seen within the thoracic aorta. Coronary artery calcifications identified. Cardiac chambers are within normal limits. No pericardial effusion. The heart and pericardium demonstrate no acute abnormality. There is no acute abnormality of the thoracic aorta. Lungs/pleura: The lungs are without acute process. Underlying chronic and emphysematous changes seen lung field bilaterally. There is scarring at the lung apices. Minimal atelectatic changes identified at the lung bases bilaterally. There is bibasilar and biapical pleural thickening and scarring. Bronchial wall thickening seen diffusely throughout the perihilar regions bilaterally suggesting a nonspecific bronchiolitis. No focal consolidation or pulmonary edema. No evidence of pleural effusion or pneumothorax. Upper Abdomen: Limited images of the upper abdomen reveal a large hiatal hernia. Soft Tissues/Bones: Multilevel degenerative changes identified diffusely throughout the thoracic spine with kyphoplasty involving T11 and T12. No evidence of pulmonary embolism. Chronic changes seen throughout the lung fields bilaterally. Biapical bibasilar pleural thickening and scarring with bronchial wall thickening in the perihilar regions to suggest a nonspecific bronchiolitis. Large hiatal hernia identified. XR CHEST PORTABLE    Result Date: 7/31/2023  EXAMINATION: ONE XRAY VIEW OF THE CHEST 7/31/2023 5:46 pm COMPARISON: 07/01/2023 HISTORY: ORDERING SYSTEM PROVIDED HISTORY: cough, fever TECHNOLOGIST PROVIDED HISTORY: Reason for exam:->cough, fever What reading provider will be dictating this exam?->CRC FINDINGS: The lungs are without acute focal process. There is no effusion or pneumothorax. The cardiomediastinal silhouette is without acute process. The osseous structures are without acute process. No acute process.      Stress test 11/20/15

## 2023-08-02 LAB
ANION GAP SERPL CALCULATED.3IONS-SCNC: 13 MEQ/L (ref 9–15)
BASOPHILS # BLD: 0 K/UL (ref 0–0.2)
BASOPHILS NFR BLD: 0.6 %
BUN SERPL-MCNC: 18 MG/DL (ref 8–23)
CALCIUM SERPL-MCNC: 8.1 MG/DL (ref 8.5–9.9)
CHLORIDE SERPL-SCNC: 103 MEQ/L (ref 95–107)
CK SERPL-CCNC: 777 U/L (ref 0–170)
CO2 SERPL-SCNC: 22 MEQ/L (ref 20–31)
CREAT SERPL-MCNC: 0.53 MG/DL (ref 0.5–0.9)
EKG ATRIAL RATE: 163 BPM
EKG Q-T INTERVAL: 332 MS
EKG QRS DURATION: 76 MS
EKG QTC CALCULATION (BAZETT): 457 MS
EKG R AXIS: 37 DEGREES
EKG T AXIS: 26 DEGREES
EKG VENTRICULAR RATE: 114 BPM
EOSINOPHIL # BLD: 0 K/UL (ref 0–0.7)
EOSINOPHIL NFR BLD: 0.1 %
ERYTHROCYTE [DISTWIDTH] IN BLOOD BY AUTOMATED COUNT: 14.3 % (ref 11.5–14.5)
GLUCOSE SERPL-MCNC: 77 MG/DL (ref 70–99)
HCT VFR BLD AUTO: 44.7 % (ref 37–47)
HGB BLD-MCNC: 14.8 G/DL (ref 12–16)
LYMPHOCYTES # BLD: 1.2 K/UL (ref 1–4.8)
LYMPHOCYTES NFR BLD: 22.7 %
MAGNESIUM SERPL-MCNC: 1.9 MG/DL (ref 1.7–2.4)
MCH RBC QN AUTO: 30.6 PG (ref 27–31.3)
MCHC RBC AUTO-ENTMCNC: 33.1 % (ref 33–37)
MCV RBC AUTO: 92.6 FL (ref 79.4–94.8)
MONOCYTES # BLD: 0.8 K/UL (ref 0.2–0.8)
MONOCYTES NFR BLD: 15.5 %
NEUTROPHILS # BLD: 3.1 K/UL (ref 1.4–6.5)
NEUTS SEG NFR BLD: 61.1 %
PLATELET # BLD AUTO: 200 K/UL (ref 130–400)
POTASSIUM SERPL-SCNC: 3.5 MEQ/L (ref 3.4–4.9)
RBC # BLD AUTO: 4.83 M/UL (ref 4.2–5.4)
SODIUM SERPL-SCNC: 138 MEQ/L (ref 135–144)
WBC # BLD AUTO: 5.1 K/UL (ref 4.8–10.8)

## 2023-08-02 PROCEDURE — 80048 BASIC METABOLIC PNL TOTAL CA: CPT

## 2023-08-02 PROCEDURE — 93005 ELECTROCARDIOGRAM TRACING: CPT | Performed by: INTERNAL MEDICINE

## 2023-08-02 PROCEDURE — 36415 COLL VENOUS BLD VENIPUNCTURE: CPT

## 2023-08-02 PROCEDURE — 83735 ASSAY OF MAGNESIUM: CPT

## 2023-08-02 PROCEDURE — 82550 ASSAY OF CK (CPK): CPT

## 2023-08-02 PROCEDURE — 99232 SBSQ HOSP IP/OBS MODERATE 35: CPT | Performed by: INTERNAL MEDICINE

## 2023-08-02 PROCEDURE — 6370000000 HC RX 637 (ALT 250 FOR IP): Performed by: INTERNAL MEDICINE

## 2023-08-02 PROCEDURE — 85025 COMPLETE CBC W/AUTO DIFF WBC: CPT

## 2023-08-02 PROCEDURE — 2500000003 HC RX 250 WO HCPCS: Performed by: PHYSICIAN ASSISTANT

## 2023-08-02 PROCEDURE — 6370000000 HC RX 637 (ALT 250 FOR IP): Performed by: PHYSICIAN ASSISTANT

## 2023-08-02 PROCEDURE — 2060000000 HC ICU INTERMEDIATE R&B

## 2023-08-02 PROCEDURE — 99221 1ST HOSP IP/OBS SF/LOW 40: CPT | Performed by: PHYSICIAN ASSISTANT

## 2023-08-02 PROCEDURE — 2580000003 HC RX 258: Performed by: INTERNAL MEDICINE

## 2023-08-02 PROCEDURE — 6360000002 HC RX W HCPCS: Performed by: INTERNAL MEDICINE

## 2023-08-02 PROCEDURE — 93010 ELECTROCARDIOGRAM REPORT: CPT | Performed by: INTERNAL MEDICINE

## 2023-08-02 RX ORDER — METOPROLOL TARTRATE 50 MG/1
50 TABLET, FILM COATED ORAL 2 TIMES DAILY
Status: DISCONTINUED | OUTPATIENT
Start: 2023-08-02 | End: 2023-08-04 | Stop reason: HOSPADM

## 2023-08-02 RX ORDER — LANOLIN ALCOHOL/MO/W.PET/CERES
400 CREAM (GRAM) TOPICAL ONCE
Status: COMPLETED | OUTPATIENT
Start: 2023-08-02 | End: 2023-08-02

## 2023-08-02 RX ORDER — DILTIAZEM HYDROCHLORIDE 60 MG/1
60 TABLET, FILM COATED ORAL EVERY 6 HOURS SCHEDULED
Status: DISCONTINUED | OUTPATIENT
Start: 2023-08-03 | End: 2023-08-04 | Stop reason: HOSPADM

## 2023-08-02 RX ADMIN — METOPROLOL TARTRATE 25 MG: 25 TABLET, FILM COATED ORAL at 09:12

## 2023-08-02 RX ADMIN — ENOXAPARIN SODIUM 60 MG: 100 INJECTION SUBCUTANEOUS at 09:15

## 2023-08-02 RX ADMIN — TAMSULOSIN HYDROCHLORIDE 0.4 MG: 0.4 CAPSULE ORAL at 09:14

## 2023-08-02 RX ADMIN — QUETIAPINE FUMARATE 25 MG: 50 TABLET ORAL at 17:50

## 2023-08-02 RX ADMIN — DILTIAZEM HYDROCHLORIDE 30 MG: 60 TABLET, FILM COATED ORAL at 17:48

## 2023-08-02 RX ADMIN — DILTIAZEM HYDROCHLORIDE 30 MG: 60 TABLET, FILM COATED ORAL at 11:01

## 2023-08-02 RX ADMIN — DILTIAZEM HYDROCHLORIDE 30 MG: 60 TABLET, FILM COATED ORAL at 04:17

## 2023-08-02 RX ADMIN — QUETIAPINE FUMARATE 25 MG: 50 TABLET ORAL at 04:17

## 2023-08-02 RX ADMIN — METOPROLOL TARTRATE 5 MG: 5 INJECTION INTRAVENOUS at 11:02

## 2023-08-02 RX ADMIN — DIVALPROEX SODIUM 125 MG: 125 TABLET, DELAYED RELEASE ORAL at 11:02

## 2023-08-02 RX ADMIN — Medication 10 ML: at 09:14

## 2023-08-02 RX ADMIN — POTASSIUM BICARBONATE 40 MEQ: 782 TABLET, EFFERVESCENT ORAL at 09:13

## 2023-08-02 RX ADMIN — MIRTAZAPINE 7.5 MG: 15 TABLET, FILM COATED ORAL at 17:49

## 2023-08-02 RX ADMIN — METOPROLOL TARTRATE 5 MG: 5 INJECTION INTRAVENOUS at 05:31

## 2023-08-02 RX ADMIN — PANTOPRAZOLE SODIUM 40 MG: 40 TABLET, DELAYED RELEASE ORAL at 04:21

## 2023-08-02 RX ADMIN — Medication 400 MG: at 09:12

## 2023-08-02 ASSESSMENT — ENCOUNTER SYMPTOMS
COUGH: 1
APNEA: 0
SHORTNESS OF BREATH: 0
GASTROINTESTINAL NEGATIVE: 1

## 2023-08-02 NOTE — PLAN OF CARE
Problem: Discharge Planning  Goal: Discharge to home or other facility with appropriate resources  Outcome: Not Progressing  Flowsheets (Taken 8/1/2023 2225)  Discharge to home or other facility with appropriate resources: Identify barriers to discharge with patient and caregiver     Problem: Skin/Tissue Integrity  Goal: Absence of new skin breakdown  Description: 1. Monitor for areas of redness and/or skin breakdown  2. Assess vascular access sites hourly  3. Every 4-6 hours minimum:  Change oxygen saturation probe site  4. Every 4-6 hours:  If on nasal continuous positive airway pressure, respiratory therapy assess nares and determine need for appliance change or resting period. Outcome: Not Progressing     Problem: ABCDS Injury Assessment  Goal: Absence of physical injury  Outcome: Not Progressing     Problem: Safety - Adult  Goal: Free from fall injury  Outcome: Not Progressing     Problem: Discharge Planning  Goal: Discharge to home or other facility with appropriate resources  Outcome: Not Progressing  Flowsheets (Taken 8/1/2023 2225)  Discharge to home or other facility with appropriate resources: Identify barriers to discharge with patient and caregiver     Problem: Skin/Tissue Integrity  Goal: Absence of new skin breakdown  Description: 1. Monitor for areas of redness and/or skin breakdown  2. Assess vascular access sites hourly  3. Every 4-6 hours minimum:  Change oxygen saturation probe site  4. Every 4-6 hours:  If on nasal continuous positive airway pressure, respiratory therapy assess nares and determine need for appliance change or resting period.   Outcome: Not Progressing     Problem: ABCDS Injury Assessment  Goal: Absence of physical injury  Outcome: Not Progressing     Problem: Safety - Adult  Goal: Free from fall injury  Outcome: Not Progressing

## 2023-08-02 NOTE — CONSULTS
OR       Social History:   Social History     Tobacco Use   Smoking Status Never   Smokeless Tobacco Never        Family History: Noncontributory; Denies h/o cancer.      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

## 2023-08-02 NOTE — PROGRESS NOTES
Gynecology does not recommend any additional therapy. A&D ointment could be applied to affected area if patient has complaints    Hypertension  Dementia     Diet: ADULT DIET;  Regular  DNR-CCA    Return to [facility] once stable    37min in total care time    Electronically signed by Jonathan Rincon DO on 8/2/2023 at 12:25 PM
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
## 2023-08-02 NOTE — CARE COORDINATION
Patient is a long term resident at [facility] and will not need a pre cert to return.  Estela Schultz) notified that patient transferred to  and is currently a 1:1.

## 2023-08-02 NOTE — PROGRESS NOTES
Pt converted into AFIB RVR 170s, gave lopresser per [facility]. HR down to 110s. Asymptomatic. Otherwise VSS stable. Dr. Yousuf Cárdenas aware, no new orders.

## 2023-08-02 NOTE — CONSULTS
Urology Consult      Suzi [place]  1935  [id number]    Date of Admission:  7/31/2023  5:03 PM  Date of Consultation:  8/2/2023    Consultant: Ellen Smith PA-C  SupervisingPhysician: Dr. Dayron Harmon  PCP:  Armando Buck MD       Reason for Consultation: urinary retention      C/C:   Chief Complaint   Patient presents with    Fatigue     Per ems family is co due to pt sleeping more and not ambulating like she normally does pt has no complaints. Pt is a/ox2. Resp even and unlabored, cough noted. History of Present Illness: Urinary Retention  Patient complains of urinary retention. Onset of retention was 0 day ago and was unknown in onset. Patient currently does not have a urinary catheter in place. ml of urine were drained when catheter was placed. Prior to this event voiding symptoms consisted of . Prior treatments include . Recent medications that may have affected his voiding include none. Allergies: Allergies   Allergen Reactions    Hydromorphone Other (See Comments)    Dilaudid [Fd&C Blue #1 Al Cornejo-Hydromorphone] Other (See Comments)     crazziness       PMH: Patient has a past medical history of Anxiety, Arthritis, Chronic back pain, Depression, Distal radial fracture, GERD (gastroesophageal reflux disease), Glaucoma, History of mammography, screening, History of osteopenia, Hypertension, Lumbar radicular pain, Polycythemia vera(238.4), Rib fracture, Senile osteoporosis, Unspecified dementia, severe, without behavioral disturbance, psychotic disturbance, mood disturbance, and anxiety ([address]), and Urinary tract infection without hematuria. PSH: Patient has a past surgical history that includes Appendectomy; Hysterectomy; back surgery; Colonoscopy; Endoscopy, colon, diagnostic; eye surgery; fracture surgery (Left, 05/06/2012); and pr perq vertebroplasty uni/bi injx cervicothoracic (N/A, 3/7/2018). Social History: Patient reports that she has never smoked.  She has never used smokeless

## 2023-08-03 ENCOUNTER — APPOINTMENT (OUTPATIENT)
Age: 88
End: 2023-08-03
Attending: INTERNAL MEDICINE
Payer: MEDICARE

## 2023-08-03 LAB
ANION GAP SERPL CALCULATED.3IONS-SCNC: 13 MEQ/L (ref 9–15)
BASOPHILS # BLD: 0 K/UL (ref 0–0.2)
BASOPHILS NFR BLD: 0.5 %
BUN SERPL-MCNC: 12 MG/DL (ref 8–23)
CALCIUM SERPL-MCNC: 8 MG/DL (ref 8.5–9.9)
CHLORIDE SERPL-SCNC: 100 MEQ/L (ref 95–107)
CK SERPL-CCNC: 793 U/L (ref 0–170)
CO2 SERPL-SCNC: 23 MEQ/L (ref 20–31)
CREAT SERPL-MCNC: 0.51 MG/DL (ref 0.5–0.9)
EOSINOPHIL # BLD: 0 K/UL (ref 0–0.7)
EOSINOPHIL NFR BLD: 0.2 %
ERYTHROCYTE [DISTWIDTH] IN BLOOD BY AUTOMATED COUNT: 14.1 % (ref 11.5–14.5)
GLUCOSE SERPL-MCNC: 93 MG/DL (ref 70–99)
HCT VFR BLD AUTO: 47.1 % (ref 37–47)
HGB BLD-MCNC: 15.8 G/DL (ref 12–16)
LYMPHOCYTES # BLD: 1.4 K/UL (ref 1–4.8)
LYMPHOCYTES NFR BLD: 32.8 %
MAGNESIUM SERPL-MCNC: 1.8 MG/DL (ref 1.7–2.4)
MCH RBC QN AUTO: 30.7 PG (ref 27–31.3)
MCHC RBC AUTO-ENTMCNC: 33.4 % (ref 33–37)
MCV RBC AUTO: 91.9 FL (ref 79.4–94.8)
MONOCYTES # BLD: 0.7 K/UL (ref 0.2–0.8)
MONOCYTES NFR BLD: 17.2 %
NEUTROPHILS # BLD: 2.1 K/UL (ref 1.4–6.5)
NEUTS SEG NFR BLD: 49.3 %
PLATELET # BLD AUTO: 193 K/UL (ref 130–400)
POTASSIUM SERPL-SCNC: 3.4 MEQ/L (ref 3.4–4.9)
RBC # BLD AUTO: 5.13 M/UL (ref 4.2–5.4)
SODIUM SERPL-SCNC: 136 MEQ/L (ref 135–144)
WBC # BLD AUTO: 4.3 K/UL (ref 4.8–10.8)

## 2023-08-03 PROCEDURE — 99231 SBSQ HOSP IP/OBS SF/LOW 25: CPT | Performed by: INTERNAL MEDICINE

## 2023-08-03 PROCEDURE — 6370000000 HC RX 637 (ALT 250 FOR IP): Performed by: INTERNAL MEDICINE

## 2023-08-03 PROCEDURE — 85025 COMPLETE CBC W/AUTO DIFF WBC: CPT

## 2023-08-03 PROCEDURE — 82550 ASSAY OF CK (CPK): CPT

## 2023-08-03 PROCEDURE — 83735 ASSAY OF MAGNESIUM: CPT

## 2023-08-03 PROCEDURE — 2060000000 HC ICU INTERMEDIATE R&B

## 2023-08-03 PROCEDURE — 99232 SBSQ HOSP IP/OBS MODERATE 35: CPT | Performed by: INTERNAL MEDICINE

## 2023-08-03 PROCEDURE — 36415 COLL VENOUS BLD VENIPUNCTURE: CPT

## 2023-08-03 PROCEDURE — 99231 SBSQ HOSP IP/OBS SF/LOW 25: CPT | Performed by: PHYSICIAN ASSISTANT

## 2023-08-03 PROCEDURE — 80048 BASIC METABOLIC PNL TOTAL CA: CPT

## 2023-08-03 RX ORDER — LANOLIN ALCOHOL/MO/W.PET/CERES
400 CREAM (GRAM) TOPICAL 2 TIMES DAILY
Status: COMPLETED | OUTPATIENT
Start: 2023-08-03 | End: 2023-08-03

## 2023-08-03 RX ADMIN — QUETIAPINE FUMARATE 25 MG: 50 TABLET ORAL at 17:32

## 2023-08-03 RX ADMIN — POTASSIUM BICARBONATE 40 MEQ: 782 TABLET, EFFERVESCENT ORAL at 17:31

## 2023-08-03 RX ADMIN — DILTIAZEM HYDROCHLORIDE 60 MG: 60 TABLET, FILM COATED ORAL at 06:10

## 2023-08-03 RX ADMIN — DIVALPROEX SODIUM 125 MG: 125 TABLET, DELAYED RELEASE ORAL at 17:31

## 2023-08-03 RX ADMIN — Medication 400 MG: at 20:22

## 2023-08-03 RX ADMIN — METOPROLOL TARTRATE 50 MG: 50 TABLET, FILM COATED ORAL at 06:10

## 2023-08-03 RX ADMIN — PANTOPRAZOLE SODIUM 40 MG: 40 TABLET, DELAYED RELEASE ORAL at 06:10

## 2023-08-03 RX ADMIN — METOPROLOL TARTRATE 50 MG: 50 TABLET, FILM COATED ORAL at 17:32

## 2023-08-03 RX ADMIN — POTASSIUM BICARBONATE 40 MEQ: 782 TABLET, EFFERVESCENT ORAL at 10:07

## 2023-08-03 RX ADMIN — APIXABAN 5 MG: 5 TABLET, FILM COATED ORAL at 17:32

## 2023-08-03 RX ADMIN — ACETAMINOPHEN 650 MG: 325 TABLET ORAL at 20:22

## 2023-08-03 RX ADMIN — DILTIAZEM HYDROCHLORIDE 60 MG: 60 TABLET, FILM COATED ORAL at 12:03

## 2023-08-03 RX ADMIN — QUETIAPINE FUMARATE 25 MG: 50 TABLET ORAL at 06:10

## 2023-08-03 RX ADMIN — DILTIAZEM HYDROCHLORIDE 60 MG: 60 TABLET, FILM COATED ORAL at 17:31

## 2023-08-03 RX ADMIN — Medication 400 MG: at 10:07

## 2023-08-03 RX ADMIN — DIVALPROEX SODIUM 125 MG: 125 TABLET, DELAYED RELEASE ORAL at 06:10

## 2023-08-03 RX ADMIN — APIXABAN 5 MG: 5 TABLET, FILM COATED ORAL at 06:10

## 2023-08-03 RX ADMIN — MIRTAZAPINE 7.5 MG: 15 TABLET, FILM COATED ORAL at 17:33

## 2023-08-03 RX ADMIN — TAMSULOSIN HYDROCHLORIDE 0.4 MG: 0.4 CAPSULE ORAL at 09:16

## 2023-08-03 ASSESSMENT — ENCOUNTER SYMPTOMS
GASTROINTESTINAL NEGATIVE: 1
APNEA: 0
SHORTNESS OF BREATH: 0
COUGH: 1

## 2023-08-03 ASSESSMENT — PAIN DESCRIPTION - LOCATION: LOCATION: BACK

## 2023-08-03 ASSESSMENT — PAIN DESCRIPTION - ORIENTATION: ORIENTATION: LOWER

## 2023-08-03 ASSESSMENT — PAIN SCALES - GENERAL: PAINLEVEL_OUTOF10: 3

## 2023-08-03 NOTE — PROGRESS NOTES
Repositioned up in bed, attempting to leave bed, pt physically and verbally aggressive with staff, refusing pm meds, 1:1 maintained, bed alarm maintained

## 2023-08-03 NOTE — PROGRESS NOTES
1930-[facility] assessment completed. PTs vitals stable on RA. ST on tele. Pt denies CP/SOB at this time. Pt confused but was abl to be somewhat reoriented. Has taken tele off but let RN replace it. Continues to refuse IV and became agitated when asked. Denies any needs at this time. Sitter remains in place for safety. Call light in reach. Team to continue to monitor. Electronically signed by Jenni Lucio RN on 8/3/2023 at 7:49 PM     Pts behaviors are more appropriate and redirectable. 1:1 discontinued. Bed alarm in place.

## 2023-08-03 NOTE — PROGRESS NOTES
Pt refused to have a new IV placed. . Numerous attempts made but pt continued to refuse and would become aggravated when asked.

## 2023-08-03 NOTE — PROGRESS NOTES
Subjective:      Patient ID: Marj Peter is a pleasant 82 y.o. female who presents today for:  No chief complaint on file. [facility]    Increased behaviors patient seen for consultation of behaviors directed at roommate. Patient currently recent addition of Depakote 125 mg p.o. twice daily. Patient has been on Seroquel 20 mg every morning and 50 mg nightly for some time now. Roommate seems to be her trigger. Cannot afford private room, however admits trying to attempt at roommate switch. No new behaviors noted at this moment time. We will consider additional Depakote increase if any further behaviors over the next week.       Patient Active Problem List   Diagnosis    Depression with anxiety    Hypertension    GERD (gastroesophageal reflux disease)    Elevated cholesterol    Atrial fibrillation with RVR (HCC)    Gait disturbance    Lumbar radicular pain    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 degeneration of both eyes    Spinal stenosis of lumbar region with radiculopathy    Senile osteoporosis    Right foot infection    Urinary tract infection without hematuria    AMS (altered mental status)    Coffee ground emesis    Dementia with behavioral disturbance (HCC)    MCI (mild cognitive impairment)    Frontal gait    Chronic pain of both knees    Height loss    Hypercalcemia    Joint stiffness of multiple sites    Multiple closed fractures of ribs of left side    Opioid dependence, daily use (HCC)    Secondary osteoarthritis of multiple sites    Wedge compression fracture of T11-T12 vertebra, initial encounter for closed fracture ([address])    Rhabdomyolysis    COVID-19 virus infection    Uterine procidentia     Past Medical History:   Diagnosis Date    Anxiety     Arthritis     Chronic back pain     DEGENERATIVE BACK    Depression     Distal radial fracture 2012    GERD (gastroesophageal reflux disease)     Glaucoma     History of mammography, screening 2011 CAT 2 BILAT    History of

## 2023-08-03 NOTE — PROGRESS NOTES
Physician Progress Note    8/3/2023   11:07 AM    Name:  Rain Grant  MRN:    [id number]      Day: 2     Admit Date: 7/31/2023  5:03 PM  PCP: Darius Griffiths MD    Code Status:  DNR-CCA    Subjective:     Converted to normal sinus rhythm overnight. No complaints at this time.     Current Facility-Administered Medications   Medication Dose Route Frequency Provider Last Rate Last Admin    magnesium oxide (MAG-OX) tablet 400 mg  400 mg Oral BID Christos Purl, DO   400 mg at 08/03/23 1007    potassium bicarb-citric acid (EFFER-K) effervescent tablet 40 mEq  40 mEq Oral BID WC Christos Purl, DO   40 mEq at 08/03/23 1007    dilTIAZem (CARDIZEM) tablet 60 mg  60 mg Oral 4 times per day Bj Mac, DO   60 mg at 08/03/23 0610    metoprolol tartrate (LOPRESSOR) tablet 50 mg  50 mg Oral BID Bj Mac, DO   50 mg at 08/03/23 0610    apixaban (ELIQUIS) tablet 5 mg  5 mg Oral BID Bj Mac, DO   5 mg at 08/03/23 1663    sodium chloride flush 0.9 % injection 5-40 mL  5-40 mL IntraVENous 2 times per day Laray Mantis, DO   10 mL at 08/02/23 0914    sodium chloride flush 0.9 % injection 5-40 mL  5-40 mL IntraVENous PRN Laray Mantis, DO        0.9 % sodium chloride infusion   IntraVENous PRN Laray Mantis, DO        ondansetron (ZOFRAN-ODT) disintegrating tablet 4 mg  4 mg Oral Q8H PRN Laray Mantis, DO        Or    ondansetron (ZOFRAN) injection 4 mg  4 mg IntraVENous Q6H PRN Laray Mantis, DO        polyethylene glycol (GLYCOLAX) packet 17 g  17 g Oral Daily PRN Laray Mantis, DO        acetaminophen (TYLENOL) tablet 650 mg  650 mg Oral Q6H PRN Laray Mantis, DO        Or    acetaminophen (TYLENOL) suppository 650 mg  650 mg Rectal Q6H PRN Laray Mantis, DO        lactated ringers bolus bolus 500 mL  500 mL IntraVENous Once Christos Purl, DO        divalproex (DEPAKOTE) DR tablet 125 mg  125 mg Oral BID Christos Purl, DO   125 mg at 08/03/23 0610    latanoprost (XALATAN) 0.005 % ophthalmic solution 1 drop

## 2023-08-03 NOTE — CARE COORDINATION
Patient is a long term resident at [facility] and will not need a pre cert to return. Estela (Jair) updated and she confirmed patient will need to be off 1:1 for 24 hrs before returning to nursing home.
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
## 2023-08-03 NOTE — PROGRESS NOTES
Subjective:      Patient ID: Suzi Broussard is a 82 y.o. female    HPI  82year old female who was found to be retaining urine on admission. She is currently voiding independently without any intervention. She has a known history of having a prolapsed uterus. Past Medical History:   Diagnosis Date    Anxiety     Arthritis     Chronic back pain     DEGENERATIVE BACK    Depression     Distal radial fracture 2012    GERD (gastroesophageal reflux disease)     Glaucoma     History of mammography, screening 2011 CAT 2 BILAT    History of osteopenia     Hypertension     meds > 8 yrs / denies TIA or stroke    Lumbar radicular pain     Polycythemia vera(238.4)     Rib fracture 11/30/2018    Senile osteoporosis     Unspecified dementia, severe, without behavioral disturbance, psychotic disturbance, mood disturbance, and anxiety (HCC)     Urinary tract infection without hematuria 11/25/2022     Past Surgical History:   Procedure Laterality Date    APPENDECTOMY      BACK SURGERY      COLONOSCOPY      ENDOSCOPY, COLON, DIAGNOSTIC      EYE SURGERY      OS eye glaucoma OR    FRACTURE SURGERY Left 05/06/2012    distal radial fracture/Dr. Chrissie Garcia    HYSTERECTOMY (CERVIX STATUS UNKNOWN)      AL PERQ VERTEBROPLASTY UNI/BI INJX CERVICOTHORACIC N/A 3/7/2018    T-11, T-12 VERTEBRAL AUGMENTATION performed by Lila Ward MD at [address] [place] [address] History     Socioeconomic History    Marital status:     Tobacco Use    Smoking status: Never    Smokeless tobacco: Never   Substance and Sexual Activity    Alcohol use: No     Comment: denies    Drug use: No    Sexual activity: Never     Social Determinants of Health     Financial Resource Strain: Low Risk     Difficulty of Paying Living Expenses: Not hard at all   Food Insecurity: No Food Insecurity    Worried About [place] in the Last Year: Never true    Ran Out of Food in the Last Year: Never true   Transportation Needs: No Transportation Needs    Lack of Transportation Flakita Roblero, DO        lactated ringers bolus bolus 500 mL  500 mL IntraVENous Once Martene Stamford, DO        divalproex (DEPAKOTE) DR tablet 125 mg  125 mg Oral BID Martene Stamford, DO   125 mg at 08/03/23 0610    latanoprost (XALATAN) 0.005 % ophthalmic solution 1 drop  1 drop Both Eyes Nightly Albion Parody Miguel Angel, DO        pantoprazole (PROTONIX) tablet 40 mg  40 mg Oral QAM AC Eddie R Miguel Angel, DO   40 mg at 08/03/23 0610    metoprolol (LOPRESSOR) injection 5 mg  5 mg IntraVENous Q6H PRN Vena Mini, PA   5 mg at 08/02/23 1102    haloperidol lactate (HALDOL) injection 2 mg  2 mg IntraMUSCular Q4H PRN Martene Stamford, DO   2 mg at 08/01/23 2225    QUEtiapine (SEROQUEL) tablet 25 mg  25 mg Oral BID Martene Stamford, DO   25 mg at 08/03/23 0610    mirtazapine (REMERON) tablet 7.5 mg  7.5 mg Oral Nightly Albion Parody Miguel Angel, DO   7.5 mg at 08/02/23 1749     Hydromorphone and Dilaudid [fd&c blue #1 [place] lake-hydromorphone]  reviewed      Review of Systems   Constitutional:  Negative for fatigue and fever. HENT:  Negative for congestion. Respiratory:  Negative for apnea. Cardiovascular:  Negative for chest pain. Genitourinary:  Negative for hematuria. Neurological:  Negative for speech difficulty. Objective:   Physical Exam  HENT:      Mouth/Throat:      Mouth: Mucous membranes are moist.   Pulmonary:      Effort: Pulmonary effort is normal.   Skin:     General: Skin is warm. Neurological:      Mental Status: She is alert and oriented to person, place, and time. Assessment:       82year old female who was found to be retaining urine on admission. She is currently voiding independently without any intervention. She has a known history of having a prolapsed uterus.          Plan:      Monitor for urinary retention        Chaya Ortiz PA-C

## 2023-08-03 NOTE — PROGRESS NOTES
Infectious Disease     Patient Name: Matthias Barahona  Date: 8/3/2023  YOB: 1935  Medical Record Number: [id number]        COVID-19 infection        Patient transferred from nursing home increasing fatigue  Found to have elevated CK at 156 respiratory antigen panel positive for COVID-19  History of vaccination  Study 90% on room air placed on O2 2 L  No fevers chills sweats cough no shortness of breath    Chest x-ray and CT scan show nonspecific patchy infiltrate      8/1/2023      Narrative & Impression  EXAMINATION:  CTA OF THE CHEST WITH AND WITHOUT CONTRAST 7/31/2023 10:51 pm     TECHNIQUE:  CTA of the chest was performed before and after the administration of  intravenous contrast.  Multiplanar reformatted images are provided for  review. MIP images are provided for review. Automated exposure control,  iterative reconstruction, and/or weight based adjustment of the mA/kV was  utilized to reduce the radiation dose to as low as reasonably achievable. COMPARISON:  None. HISTORY:  ORDERING SYSTEM PROVIDED HISTORY: pe  TECHNOLOGIST PROVIDED HISTORY:  Reason for exam:->pe  Decision Support Exception - unselect if not a suspected or confirmed  emergency medical condition->Emergency Medical Condition (MA)  What reading provider will be dictating this exam?->CRC     FINDINGS:  Pulmonary Arteries: Pulmonary arteries are adequately opacified for  evaluation. No evidence of intraluminal filling defect to suggest pulmonary  embolism. Main pulmonary artery is normal in caliber. Mediastinum: No evidence of mediastinal lymphadenopathy. Thyroid is  homogeneous in appearance. Vascular calcifications seen within the thoracic  aorta. Coronary artery calcifications identified. Cardiac chambers are  within normal limits. No pericardial effusion. The heart and pericardium  demonstrate no acute abnormality. There is no acute abnormality of the  thoracic aorta. Lungs/pleura:  The lungs are without acute process. Underlying chronic and  emphysematous changes seen lung field bilaterally. There is scarring at the  lung apices. Minimal atelectatic changes identified at the lung bases  bilaterally. There is bibasilar and biapical pleural thickening and  scarring. Bronchial wall thickening seen diffusely throughout the perihilar  regions bilaterally suggesting a nonspecific bronchiolitis. No focal  consolidation or pulmonary edema. No evidence of pleural effusion or  pneumothorax. Upper Abdomen: Limited images of the upper abdomen reveal a large hiatal  hernia. Soft Tissues/Bones: Multilevel degenerative changes identified diffusely  throughout the thoracic spine with kyphoplasty involving T11 and T12. IMPRESSION:  No evidence of pulmonary embolism. Chronic changes seen throughout the lung  fields bilaterally. Biapical bibasilar pleural thickening and scarring with  bronchial wall thickening in the perihilar regions to suggest a nonspecific  bronchiolitis. Large hiatal hernia identified. Review of Systems   Constitutional:  Positive for fatigue. Negative for chills, diaphoresis and fever. Respiratory:  Positive for cough. Negative for shortness of breath. Cardiovascular: Negative. Gastrointestinal: Negative. Musculoskeletal:  Negative for arthralgias. Physical Exam  HENT:      Head: Normocephalic. Cardiovascular:      Heart sounds: Normal heart sounds. No murmur heard. Pulmonary:      Effort: Pulmonary effort is normal. No respiratory distress. Breath sounds: Normal breath sounds. No stridor. No wheezing, rhonchi or rales. Chest:      Chest wall: No tenderness. Abdominal:      General: Abdomen is flat. Bowel sounds are normal. There is no distension. Palpations: Abdomen is soft. There is no mass. Tenderness: There is no abdominal tenderness. There is no guarding or rebound. Hernia: No hernia is present.    Skin:     Coloration: Skin is not

## 2023-08-04 ENCOUNTER — APPOINTMENT (OUTPATIENT)
Age: 88
End: 2023-08-04
Attending: INTERNAL MEDICINE
Payer: MEDICARE

## 2023-08-04 VITALS
WEIGHT: 137 LBS | BODY MASS INDEX: 24.27 KG/M2 | OXYGEN SATURATION: 100 % | RESPIRATION RATE: 18 BRPM | HEART RATE: 76 BPM | DIASTOLIC BLOOD PRESSURE: 57 MMHG | SYSTOLIC BLOOD PRESSURE: 83 MMHG | HEIGHT: 63 IN | TEMPERATURE: 97.9 F

## 2023-08-04 LAB
ANION GAP SERPL CALCULATED.3IONS-SCNC: 11 MEQ/L (ref 9–15)
BASOPHILS # BLD: 0 K/UL (ref 0–0.2)
BASOPHILS NFR BLD: 0.5 %
BUN SERPL-MCNC: 20 MG/DL (ref 8–23)
CALCIUM SERPL-MCNC: 8.5 MG/DL (ref 8.5–9.9)
CHLORIDE SERPL-SCNC: 101 MEQ/L (ref 95–107)
CK SERPL-CCNC: 389 U/L (ref 0–170)
CO2 SERPL-SCNC: 28 MEQ/L (ref 20–31)
CREAT SERPL-MCNC: 0.91 MG/DL (ref 0.5–0.9)
ECHO BSA: 1.66 M2
ECHO LV FRACTIONAL SHORTENING: 39 % (ref 28–44)
ECHO LV INTERNAL DIMENSION DIASTOLE INDEX: 1.88 CM/M2
ECHO LV INTERNAL DIMENSION DIASTOLIC: 3.1 CM (ref 3.9–5.3)
ECHO LV INTERNAL DIMENSION SYSTOLIC INDEX: 1.15 CM/M2
ECHO LV INTERNAL DIMENSION SYSTOLIC: 1.9 CM
ECHO LV IVSD: 1.4 CM (ref 0.6–0.9)
ECHO LV IVSS: 1.5 CM
ECHO LV MASS 2D: 138.1 G (ref 67–162)
ECHO LV MASS INDEX 2D: 83.7 G/M2 (ref 43–95)
ECHO LV POSTERIOR WALL DIASTOLIC: 1.3 CM (ref 0.6–0.9)
ECHO LV POSTERIOR WALL SYSTOLIC: 1.4 CM
ECHO LV RELATIVE WALL THICKNESS RATIO: 0.84
ECHO RV INTERNAL DIMENSION: 2.7 CM
ECHO TV REGURGITANT MAX VELOCITY: 1.76 M/S
ECHO TV REGURGITANT PEAK GRADIENT: 12 MMHG
EOSINOPHIL # BLD: 0 K/UL (ref 0–0.7)
EOSINOPHIL NFR BLD: 0.3 %
ERYTHROCYTE [DISTWIDTH] IN BLOOD BY AUTOMATED COUNT: 14.4 % (ref 11.5–14.5)
GLUCOSE SERPL-MCNC: 105 MG/DL (ref 70–99)
HCT VFR BLD AUTO: 47 % (ref 37–47)
HGB BLD-MCNC: 15.6 G/DL (ref 12–16)
LYMPHOCYTES # BLD: 2.1 K/UL (ref 1–4.8)
LYMPHOCYTES NFR BLD: 48 %
MAGNESIUM SERPL-MCNC: 2 MG/DL (ref 1.7–2.4)
MCH RBC QN AUTO: 30.8 PG (ref 27–31.3)
MCHC RBC AUTO-ENTMCNC: 33.3 % (ref 33–37)
MCV RBC AUTO: 92.6 FL (ref 79.4–94.8)
MONOCYTES # BLD: 0.6 K/UL (ref 0.2–0.8)
MONOCYTES NFR BLD: 15.2 %
NEUTROPHILS # BLD: 1.5 K/UL (ref 1.4–6.5)
NEUTS SEG NFR BLD: 36 %
PLATELET # BLD AUTO: 198 K/UL (ref 130–400)
POTASSIUM SERPL-SCNC: 3.8 MEQ/L (ref 3.4–4.9)
RBC # BLD AUTO: 5.08 M/UL (ref 4.2–5.4)
SODIUM SERPL-SCNC: 140 MEQ/L (ref 135–144)
WBC # BLD AUTO: 4.3 K/UL (ref 4.8–10.8)

## 2023-08-04 PROCEDURE — 99231 SBSQ HOSP IP/OBS SF/LOW 25: CPT | Performed by: PHYSICIAN ASSISTANT

## 2023-08-04 PROCEDURE — 6370000000 HC RX 637 (ALT 250 FOR IP): Performed by: INTERNAL MEDICINE

## 2023-08-04 PROCEDURE — 6360000002 HC RX W HCPCS: Performed by: INTERNAL MEDICINE

## 2023-08-04 PROCEDURE — 36415 COLL VENOUS BLD VENIPUNCTURE: CPT

## 2023-08-04 PROCEDURE — 99232 SBSQ HOSP IP/OBS MODERATE 35: CPT | Performed by: INTERNAL MEDICINE

## 2023-08-04 PROCEDURE — 82550 ASSAY OF CK (CPK): CPT

## 2023-08-04 PROCEDURE — 85025 COMPLETE CBC W/AUTO DIFF WBC: CPT

## 2023-08-04 PROCEDURE — 80048 BASIC METABOLIC PNL TOTAL CA: CPT

## 2023-08-04 PROCEDURE — 99231 SBSQ HOSP IP/OBS SF/LOW 25: CPT | Performed by: INTERNAL MEDICINE

## 2023-08-04 PROCEDURE — 93308 TTE F-UP OR LMTD: CPT

## 2023-08-04 PROCEDURE — 83735 ASSAY OF MAGNESIUM: CPT

## 2023-08-04 RX ORDER — DILTIAZEM HYDROCHLORIDE 240 MG/1
240 CAPSULE, COATED, EXTENDED RELEASE ORAL DAILY
DISCHARGE
Start: 2023-08-04

## 2023-08-04 RX ORDER — METOPROLOL TARTRATE 50 MG/1
50 TABLET, FILM COATED ORAL 2 TIMES DAILY
Qty: 60 TABLET | Refills: 0 | DISCHARGE
Start: 2023-08-04

## 2023-08-04 RX ORDER — QUETIAPINE FUMARATE 25 MG/1
TABLET, FILM COATED ORAL
COMMUNITY
Start: 2023-08-04

## 2023-08-04 RX ADMIN — POTASSIUM BICARBONATE 40 MEQ: 782 TABLET, EFFERVESCENT ORAL at 09:51

## 2023-08-04 RX ADMIN — APIXABAN 5 MG: 5 TABLET, FILM COATED ORAL at 18:36

## 2023-08-04 RX ADMIN — DILTIAZEM HYDROCHLORIDE 60 MG: 60 TABLET, FILM COATED ORAL at 00:45

## 2023-08-04 RX ADMIN — HALOPERIDOL LACTATE 2 MG: 5 INJECTION, SOLUTION INTRAMUSCULAR at 11:54

## 2023-08-04 ASSESSMENT — ENCOUNTER SYMPTOMS
SHORTNESS OF BREATH: 0
APNEA: 0
GASTROINTESTINAL NEGATIVE: 1
COUGH: 1

## 2023-08-04 NOTE — DISCHARGE SUMMARY
[facility] Medicine Discharge Summary    Federico Singh  :  1935  MRN:  [id number]    Admit date:  2023  Discharge date:  2023    Admitting Physician:  Flakita Roblero DO  Primary Care Physician:  Avni Sexton MD    Discharge Diagnoses:    Principal Problem:    Atrial fibrillation with RVR ([address])  Active Problems:    Dementia with behavioral disturbance (HCC)    Rhabdomyolysis    COVID-19 virus infection    Uterine procidentia  Resolved Problems:    * No resolved hospital problems. *    Chief Complaint   Patient presents with    Fatigue     Per ems family is co due to pt sleeping more and not ambulating like she normally does pt has no complaints. Pt is a/ox2. Resp even and unlabored, cough noted. Condition: improved   Activity: no restrictions   Diet: regular  Disposition: Yasmin Black  Functional Status: ambulatory with assistance    Significant Findings:     SARS-CoV-2 PCR detected    Hospital Course:   82-year-old female with dementia who resides at [address] home was sent to the ED for increased fatigue. She was found to have COVID-19 infection and nontraumatic rhabdomyolysis with dehydration. Her course was complicated by atrial fibrillation with RVR with heart rates going up to 170. Ultimately she converted back to normal sinus rhythm. Her rate controlling agents were adjusted by cardiology as reflected below. She remained mostly asymptomatic from the COVID-19 infection-infectious disease did not recommend any additional treatment. She did have intermittent agitation with staff however this improved and sitter was discontinued on 8/3. She will be discharged back to nursing home when she is off sitter for 24 hours. She remained normotensive throughout the hospitalization while home blood pressure agents were held. Lisinopril was therefore held at discharge.     We recommend she remain in [facility] isolation for a total of 10 days from

## 2023-08-04 NOTE — PROGRESS NOTES
Infectious Disease     Patient Name: Marj Peter  Date: 8/4/2023  YOB: 1935  Medical Record Number: [id number]        COVID-19 infection        Patient transferred from nursing home increasing fatigue  Found to have elevated CK at 156 respiratory antigen panel positive for COVID-19  History of vaccination  Study 90% on room air placed on O2 2 L  No fevers chills sweats cough no shortness of breath    Chest x-ray and CT scan show nonspecific patchy infiltrate      8/1/2023      Narrative & Impression  EXAMINATION:  CTA OF THE CHEST WITH AND WITHOUT CONTRAST 7/31/2023 10:51 pm     TECHNIQUE:  CTA of the chest was performed before and after the administration of  intravenous contrast.  Multiplanar reformatted images are provided for  review. MIP images are provided for review. Automated exposure control,  iterative reconstruction, and/or weight based adjustment of the mA/kV was  utilized to reduce the radiation dose to as low as reasonably achievable. COMPARISON:  None. HISTORY:  ORDERING SYSTEM PROVIDED HISTORY: pe  TECHNOLOGIST PROVIDED HISTORY:  Reason for exam:->pe  Decision Support Exception - unselect if not a suspected or confirmed  emergency medical condition->Emergency Medical Condition (MA)  What reading provider will be dictating this exam?->CRC     FINDINGS:  Pulmonary Arteries: Pulmonary arteries are adequately opacified for  evaluation. No evidence of intraluminal filling defect to suggest pulmonary  embolism. Main pulmonary artery is normal in caliber. Mediastinum: No evidence of mediastinal lymphadenopathy. Thyroid is  homogeneous in appearance. Vascular calcifications seen within the thoracic  aorta. Coronary artery calcifications identified. Cardiac chambers are  within normal limits. No pericardial effusion. The heart and pericardium  demonstrate no acute abnormality. There is no acute abnormality of the  thoracic aorta. Lungs/pleura:  The lungs are without Component Value Date    WBC 4.3 (L) 08/04/2023    HGB 15.6 08/04/2023    HCT 47.0 08/04/2023    MCV 92.6 08/04/2023     08/04/2023     Lab Results   Component Value Date/Time     08/04/2023 05:18 AM    K 3.8 08/04/2023 05:18 AM     08/04/2023 05:18 AM    CO2 28 08/04/2023 05:18 AM    BUN 20 08/04/2023 05:18 AM    CREATININE 0.91 08/04/2023 05:18 AM    GLUCOSE 105 08/04/2023 05:18 AM    GLUCOSE 107 05/07/2012 11:55 AM    CALCIUM 8.5 08/04/2023 05:18 AM    LABGLOM >60.0 08/04/2023 05:18 AM    LABGLOM 80 07/12/2023 12:00 AM            ASSESSMENT:  PLAN:    COVID-19 infection  not hypoxic

## 2023-08-04 NOTE — PROGRESS NOTES
Subjective:      Patient ID: Yuli Menon is a 82 y.o. female    HPI82 year old female who was found to be retaining urine on admission. She is currently voiding independently without any intervention. She has a known history of having a prolapsed uterus. Past Medical History:   Diagnosis Date    Anxiety     Arthritis     Chronic back pain     DEGENERATIVE BACK    Depression     Distal radial fracture 2012    GERD (gastroesophageal reflux disease)     Glaucoma     History of mammography, screening 2011 CAT 2 BILAT    History of osteopenia     Hypertension     meds > 8 yrs / denies TIA or stroke    Lumbar radicular pain     Polycythemia vera(238.4)     Rib fracture 11/30/2018    Senile osteoporosis     Unspecified dementia, severe, without behavioral disturbance, psychotic disturbance, mood disturbance, and anxiety (HCC)     Urinary tract infection without hematuria 11/25/2022     Past Surgical History:   Procedure Laterality Date    APPENDECTOMY      BACK SURGERY      COLONOSCOPY      ENDOSCOPY, COLON, DIAGNOSTIC      EYE SURGERY      OS eye glaucoma OR    FRACTURE SURGERY Left 05/06/2012    distal radial fracture/Dr. Te Grimm    HYSTERECTOMY (CERVIX STATUS UNKNOWN)      MT PERQ VERTEBROPLASTY UNI/BI INJX CERVICOTHORACIC N/A 3/7/2018    T-11, T-12 VERTEBRAL AUGMENTATION performed by Fabiana Damon MD at [address] [place] [address] History     Socioeconomic History    Marital status:     Tobacco Use    Smoking status: Never    Smokeless tobacco: Never   Substance and Sexual Activity    Alcohol use: No     Comment: denies    Drug use: No    Sexual activity: Never     Social Determinants of Health     Financial Resource Strain: Low Risk     Difficulty of Paying Living Expenses: Not hard at all   Food Insecurity: No Food Insecurity    Worried About [place] in the Last Year: Never true    Ran Out of Food in the Last Year: Never true   Transportation Needs: No Transportation Needs    Lack of Transportation (Medical): No    Lack of Transportation (Non-Medical):  No   Physical Activity: Inactive    Days of Exercise per Week: 0 days    Minutes of Exercise per Session: 0 min   Housing Stability: Unknown    Unstable Housing in the Last Year: No     Family History   Problem Relation Age of Onset    Breast Cancer Daughter     Cancer Daughter         kidney cancer    Diabetes Mother     Diabetes Father     High Cholesterol Sister     Other Sister          of old age    Other Brother         accident / car fall on him    Cancer Son         prostate cancer     Current Facility-Administered Medications   Medication Dose Route Frequency Provider Last Rate Last Admin    dilTIAZem (CARDIZEM) tablet 60 mg  60 mg Oral 4 times per day Clearance Polvadera, DO   60 mg at 23 0045    metoprolol tartrate (LOPRESSOR) tablet 50 mg  50 mg Oral BID Loai F Marouf, DO   50 mg at 23    apixaban (ELIQUIS) tablet 5 mg  5 mg Oral BID Loai F Marouf, DO   5 mg at 23    sodium chloride flush 0.9 % injection 5-40 mL  5-40 mL IntraVENous 2 times per day Ophelia Sharon, DO   10 mL at 23 0914    sodium chloride flush 0.9 % injection 5-40 mL  5-40 mL IntraVENous PRN Ophelia Sharon, DO        0.9 % sodium chloride infusion   IntraVENous PRN Ophelia Sharon, DO        ondansetron (ZOFRAN-ODT) disintegrating tablet 4 mg  4 mg Oral Q8H PRN Ophelia Sharon, DO        Or    ondansetron (ZOFRAN) injection 4 mg  4 mg IntraVENous Q6H PRN Ophelia Sharon, DO        polyethylene glycol (GLYCOLAX) packet 17 g  17 g Oral Daily PRN Ophelia Sharon, DO        acetaminophen (TYLENOL) tablet 650 mg  650 mg Oral Q6H PRN Ophelia Sharon, DO   650 mg at 23    Or    acetaminophen (TYLENOL) suppository 650 mg  650 mg Rectal Q6H PRN Ophelia Sharon, DO        lactated ringers bolus bolus 500 mL  500 mL IntraVENous Once Deyanne Ronn, DO        divalproex (DEPAKOTE) DR tablet 125 mg  125 mg Oral BID Deyanne Ronn, DO   125 mg at 23 173

## 2023-08-04 NOTE — FLOWSHEET NOTE
Attempted to call report    no one would answer  sec.  Informed that squad is supposed to arrive at five

## 2023-08-04 NOTE — DISCHARGE INSTR - COC
Continuity of Care Form    Patient Name: Tarun Saldivar   :  1935  MRN:  [id number]    Admit date:  2023  Discharge date:      Code Status Order: DNR-CCA   Advance Directives:     Admitting Physician:  Joyce Milner DO  PCP: Shanel Yi MD    Discharging Nurse: [facility] Unit/Room#: [address] Unit Phone Number: [phone]    Emergency Contact:   Extended Emergency Contact Information  Primary Emergency Contact: [address] Phone: [phone]  Mobile Phone: [phone]  Relation: Child  Preferred language: English   needed?  No  Secondary Emergency Contact: [facility] [address] [address] Phone: [phone]  Work Phone: [phone]  Mobile Phone: [phone]  Relation: Child    Past Surgical History:  Past Surgical History:   Procedure Laterality Date    APPENDECTOMY      BACK SURGERY      COLONOSCOPY      ENDOSCOPY, COLON, DIAGNOSTIC      EYE SURGERY      OS eye glaucoma OR    FRACTURE SURGERY Left 2012    distal radial fracture/Dr. Howard Councilman    HYSTERECTOMY (CERVIX STATUS UNKNOWN)      CA PERQ VERTEBROPLASTY UNI/BI INJX CERVICOTHORACIC N/A 3/7/2018    T-11, T-12 VERTEBRAL AUGMENTATION performed by Blanca Poole MD at [address] [place] [address]       Immunization History:   Immunization History   Administered Date(s) Administered    COVID-19, [address] [address], Primary or Immunocompromised, (age 12y+), IM, 100 mcg/0.5mL 2021, 2021, 2021    COVID-19, MODERNA Bivalent, (age 12y+), IM, 48 mcg/0.5 mL 2022    Influenza A (C0U5-29) Vaccine PF IM 2010    Influenza Vaccine, unspecified formulation 10/28/2011, 10/15/2012, 2013, 2014, 10/12/2015, 2016    Influenza Virus Vaccine 10/28/2011, 10/15/2012, 2013, 2014, 10/12/2015, 2016    Influenza, FLUAD, (age 72 y+), Adjuvanted, 0.5mL 2021, 11/10/2022    Influenza, FLUZONE (age 72 y+), High Dose, 0.7mL 2020    Influenza, High Dose (Fluzone 65 yrs and older) 10/12/2015, 09/26/2016, 09/01/2017, 09/13/2018    Influenza, Triv, inactivated, subunit, adjuvanted, IM (Fluad 65 yrs and older) 09/13/2018, 10/02/2019    Pneumococcal Conjugate 7-valent (Prevnar7) 04/18/2006    Pneumococcal, PCV-13, PREVNAR 13, (age 6w+), IM, 0.5mL 03/03/2016    Pneumococcal, PPSV23, PNEUMOVAX 23, (age 2y+), SC/IM, 0.5mL 06/16/2017    TDaP, ADACEL (age 6y-58y), [facility] (age 10y+), IM, 0.5mL 09/24/2012, 10/02/2017    Td, unspecified formulation 09/24/2012    Zoster Live (Zostavax) 09/03/2013       Active Problems:  Patient Active Problem List   Diagnosis Code    Depression with anxiety F41.8    Hypertension I10    GERD (gastroesophageal reflux disease) K21.9    Elevated cholesterol E78.00    Atrial fibrillation with RVR ([facility]) I48.91    Gait disturbance R26.9    Lumbar radicular pain M54.16    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 degeneration of both eyes H35.3132    Spinal stenosis of lumbar region with radiculopathy M48.061, M54.16    Senile osteoporosis M81.0    Right foot infection L08.9    Urinary tract infection without hematuria N39.0    AMS (altered mental status) R41.82    Coffee ground emesis K92.0    Dementia with behavioral disturbance (HCC) F03.918    MCI (mild cognitive impairment) G31.84    Frontal gait R26.89    Chronic pain of both knees M25.561, M25.562, G89.29    Height loss R29.890    Hypercalcemia E83.52    Joint stiffness of multiple sites M25.60    Multiple closed fractures of ribs of left side S22.42XA    Opioid dependence, daily use (HCC) F11.20    Secondary osteoarthritis of multiple sites M15.3    Wedge compression fracture of T11-T12 vertebra, initial encounter for closed fracture ([address]) S22.080A    Rhabdomyolysis M62.82    COVID-19 virus infection U07.1    Uterine procidentia N81.3       Isolation/Infection:   Isolation            Droplet Plus          Patient Infection Status       Infection Onset Added Last Indicated Last

## 2023-08-04 NOTE — CARE COORDINATION
Patient is a long term resident at [facility] and can return after 1:1 has been off for 24 hours. Per nursing, 1:1 was discontinued around 6AM today. Estela from [facility] updated. Patient will not need a pre cert. [facility] scheduled for tonight at 5:00. Nurse, patient, and daughter informed.

## 2023-08-05 LAB
BACTERIA BLD CULT ORG #2: NORMAL
BACTERIA BLD CULT: NORMAL

## 2023-08-07 ENCOUNTER — OFFICE VISIT (OUTPATIENT)
Dept: GERIATRIC MEDICINE | Age: 88
End: 2023-08-07
Payer: MEDICARE

## 2023-08-07 DIAGNOSIS — R53.1 WEAKNESS: ICD-10-CM

## 2023-08-07 DIAGNOSIS — F03.918 DEMENTIA WITH BEHAVIORAL DISTURBANCE (HCC): ICD-10-CM

## 2023-08-07 DIAGNOSIS — U07.1 COVID-19 VIRUS INFECTION: Primary | ICD-10-CM

## 2023-08-07 DIAGNOSIS — M48.061 SPINAL STENOSIS OF LUMBAR REGION WITH RADICULOPATHY: ICD-10-CM

## 2023-08-07 DIAGNOSIS — I10 PRIMARY HYPERTENSION: ICD-10-CM

## 2023-08-07 DIAGNOSIS — M54.16 SPINAL STENOSIS OF LUMBAR REGION WITH RADICULOPATHY: ICD-10-CM

## 2023-08-07 PROCEDURE — 99305 1ST NF CARE MODERATE MDM 35: CPT | Performed by: INTERNAL MEDICINE

## 2023-08-07 PROCEDURE — 1123F ACP DISCUSS/DSCN MKR DOCD: CPT | Performed by: INTERNAL MEDICINE

## 2023-08-17 ENCOUNTER — OFFICE VISIT (OUTPATIENT)
Dept: GERIATRIC MEDICINE | Age: 88
End: 2023-08-17

## 2023-08-17 DIAGNOSIS — F03.918 DEMENTIA WITH BEHAVIORAL DISTURBANCE (HCC): Primary | ICD-10-CM

## 2023-08-17 DIAGNOSIS — R46.89 COMBATIVE BEHAVIOR: ICD-10-CM

## 2023-08-17 DIAGNOSIS — Z79.899 ENCOUNTER FOR MEDICATION REVIEW: ICD-10-CM

## 2023-08-17 DIAGNOSIS — Z87.898 HISTORY OF HALLUCINATIONS: ICD-10-CM

## 2023-08-17 DIAGNOSIS — R41.0 CONFUSION: ICD-10-CM

## 2023-08-23 ENCOUNTER — OFFICE VISIT (OUTPATIENT)
Dept: GERIATRIC MEDICINE | Age: 88
End: 2023-08-23

## 2023-08-23 DIAGNOSIS — F03.918 AGGRESSIVE BEHAVIOR DUE TO DEMENTIA (HCC): ICD-10-CM

## 2023-08-23 DIAGNOSIS — F03.911 AGITATION DUE TO DEMENTIA (HCC): Primary | ICD-10-CM

## 2023-08-24 NOTE — PROGRESS NOTES
Subjective:      Patient ID: Marybel Bardales is a pleasant 82 y.o. female who presents today for:  No chief complaint on file. [facility]    Following up with patient today due to increased behavior with no improvement with current Seroquel dosing. Seroquel does not seem to be improving her behaviors at this time. She is not having a new hallucinations, whether auditory or visual.  No new depression or anxiety. She does not get along well with her roommate and tends to be combative during the evening hours with side roommate/staff as well. Due to this we will change her current Depakote to 200 mg p.o. twice daily and change Seroquel to 25 mg p.o. twice daily. We will have her reevaluated/follow-up with psychiatric service when available. Patient otherwise doing well with no further changes or concerns.       Patient Active Problem List   Diagnosis    Depression with anxiety    Hypertension    GERD (gastroesophageal reflux disease)    Elevated cholesterol    Atrial fibrillation with RVR (HCC)    Gait disturbance    Lumbar radicular pain    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 degeneration of both eyes    Spinal stenosis of lumbar region with radiculopathy    Senile osteoporosis    Right foot infection    Urinary tract infection without hematuria    AMS (altered mental status)    Coffee ground emesis    Dementia with behavioral disturbance (HCC)    MCI (mild cognitive impairment)    Frontal gait    Chronic pain of both knees    Height loss    Hypercalcemia    Joint stiffness of multiple sites    Multiple closed fractures of ribs of left side    Opioid dependence, daily use (HCC)    Secondary osteoarthritis of multiple sites    Wedge compression fracture of T11-T12 vertebra, initial encounter for closed fracture ([address])    Rhabdomyolysis    COVID-19 virus infection    Uterine procidentia     Past Medical History:   Diagnosis Date    Anxiety     Arthritis     Chronic back pain

## 2023-08-31 NOTE — PROGRESS NOTES
Transportation (Medical): No    Lack of Transportation (Non-Medical): No   Physical Activity: Inactive    Days of Exercise per Week: 0 days    Minutes of Exercise per Session: 0 min   Stress: Not on file   Social Connections: Not on file   Intimate Partner Violence: Not on file   Housing Stability: Unknown    Unable to Pay for Housing in the Last Year: Not on file    Number of Places Lived in the Last Year: Not on file    Unstable Housing in the Last Year: No     Family History   Problem Relation Age of Onset    Breast Cancer Daughter     Cancer Daughter         kidney cancer    Diabetes Mother     Diabetes Father     High Cholesterol Sister     Other Sister          of old age    Other Brother         accident / car fall on him    Cancer Son         prostate cancer     Allergies   Allergen Reactions    Hydromorphone Other (See Comments)    Dilaudid [Fd&C Blue #1 Al Cornejo-Hydromorphone] Other (See Comments)     crazziness           Review of Systems   Unable to perform ROS: Dementia   All other systems reviewed and are negative. Objective:   VS: See Dhruv. Reviewed. Physical Exam  Vitals reviewed. Constitutional:       General: She is not in acute distress. Appearance: Normal appearance. She is normal weight. She is not ill-appearing. HENT:      Head: Normocephalic and atraumatic. Mouth/Throat:      Mouth: Mucous membranes are moist.      Pharynx: Oropharynx is clear. Eyes:      Extraocular Movements: Extraocular movements intact. Conjunctiva/sclera: Conjunctivae normal.      Pupils: Pupils are equal, round, and reactive to light. Cardiovascular:      Rate and Rhythm: Normal rate and regular rhythm. Pulmonary:      Effort: Pulmonary effort is normal.      Breath sounds: Normal breath sounds. Abdominal:      General: Abdomen is flat. Bowel sounds are normal.      Palpations: Abdomen is soft. Skin:     General: Skin is warm and dry. Coloration: Skin is not pale.       Findings:

## 2023-09-04 ENCOUNTER — OFFICE VISIT (OUTPATIENT)
Dept: GERIATRIC MEDICINE | Age: 88
End: 2023-09-04

## 2023-09-04 DIAGNOSIS — M54.16 LUMBAR RADICULAR PAIN: Primary | ICD-10-CM

## 2023-09-04 DIAGNOSIS — I10 PRIMARY HYPERTENSION: ICD-10-CM

## 2023-09-04 DIAGNOSIS — F03.918 DEMENTIA WITH BEHAVIORAL DISTURBANCE (HCC): ICD-10-CM

## 2023-09-04 ASSESSMENT — ENCOUNTER SYMPTOMS
COUGH: 1
SHORTNESS OF BREATH: 1
CONSTIPATION: 1
BACK PAIN: 1

## 2023-09-04 NOTE — PROGRESS NOTES
hematuria 11/25/2022           Past Surgical History:   Procedure Laterality Date    APPENDECTOMY      BACK SURGERY      COLONOSCOPY      ENDOSCOPY, COLON, DIAGNOSTIC      EYE SURGERY      OS eye glaucoma OR    FRACTURE SURGERY Left 05/06/2012    distal radial fracture/Dr. Verónica Schmitt    HYSTERECTOMY (CERVIX STATUS UNKNOWN)      [place] PERQ VERTEBROPLASTY UNI/BI INJX CERVICOTHORACIC N/A 3/7/2018    T-11, T-12 VERTEBRAL AUGMENTATION performed by Gali Youssef MD at [address] [place] [address]         Current Outpatient Medications on File Prior to Visit   Medication Sig Dispense Refill    apixaban (ELIQUIS) 5 MG TABS tablet Take 1 tablet by mouth 2 times daily 60 tablet     dilTIAZem (CARDIZEM CD) 240 MG extended release capsule Take 1 capsule by mouth daily      metoprolol tartrate (LOPRESSOR) 50 MG tablet Take 1 tablet by mouth 2 times daily 60 tablet 0    QUEtiapine (SEROQUEL) 25 MG tablet 25 mg in the morning and 50 mg in the evening      divalproex (DEPAKOTE) 125 MG DR tablet Take 1 tablet by mouth in the morning and at bedtime      mirtazapine (REMERON) 15 MG tablet Take 0.5 tablets by mouth nightly      acetaminophen (TYLENOL) 325 MG tablet Take 2 tablets by mouth every 4 hours as needed for Pain or Fever      bisacodyl (DULCOLAX) 10 MG suppository Place 1 suppository rectally daily as needed for Constipation      nystatin-triamcinolone (MYCOLOG II) 179103-6.1 UNIT/GM-% cream Apply topically 2 times daily.  60 g 1    latanoprost (XALATAN) 0.005 % ophthalmic solution Place 1 drop into both eyes nightly Indications: Glaucoma      omeprazole (PRILOSEC) 20 MG delayed release capsule Take 1 capsule by mouth daily Indications: Gastroesophageal Reflux Disease      Handicap Placard MISC by Does not apply route Start date:  12/18/2019 Expiration date 12/18/2024 1 each 0    Nutritional Supplements (ENSURE COMPLETE SHAKE) LIQD Take 1 Can by mouth 3 times daily Strawberry or chocolate flavor 90 Bottle 5     No current facility-administered medications on

## 2023-09-15 ENCOUNTER — OFFICE VISIT (OUTPATIENT)
Dept: GERIATRIC MEDICINE | Age: 88
End: 2023-09-15
Payer: MEDICARE

## 2023-09-15 DIAGNOSIS — Z87.19 HISTORY OF HEMATEMESIS: Primary | ICD-10-CM

## 2023-09-15 DIAGNOSIS — K21.9 GASTROESOPHAGEAL REFLUX DISEASE WITHOUT ESOPHAGITIS: ICD-10-CM

## 2023-09-15 DIAGNOSIS — F03.918 DEMENTIA WITH BEHAVIORAL DISTURBANCE (HCC): ICD-10-CM

## 2023-09-15 PROCEDURE — 1123F ACP DISCUSS/DSCN MKR DOCD: CPT | Performed by: PHYSICIAN ASSISTANT

## 2023-09-15 PROCEDURE — 99308 SBSQ NF CARE LOW MDM 20: CPT | Performed by: PHYSICIAN ASSISTANT

## 2023-09-15 NOTE — PROGRESS NOTES
SUBJECTIVE:        ROS:  The rest of the 14 point ROS negative    PHYSICAL EXAM: VSS per facility record      ASSESSMENT & PLAN:   Diagnosis Orders   1. Dementia with behavioral disturbance ([address])        2. Confusion        3.  Encounter for medication review                      Past Medical History:   Diagnosis Date    Anxiety     Arthritis     Chronic back pain     DEGENERATIVE BACK    Depression     Distal radial fracture 2012    GERD (gastroesophageal reflux disease)     Glaucoma     History of mammography, screening 2011 CAT 2 BILAT    History of osteopenia     Hypertension     meds > 8 yrs / denies TIA or stroke    Lumbar radicular pain     Polycythemia vera(238.4)     Rib fracture 11/30/2018    Senile osteoporosis     Unspecified dementia, severe, without behavioral disturbance, psychotic disturbance, mood disturbance, and anxiety (HCC)     Urinary tract infection without hematuria 11/25/2022         Past Surgical History:   Procedure Laterality Date    APPENDECTOMY      BACK SURGERY      COLONOSCOPY      ENDOSCOPY, COLON, DIAGNOSTIC      EYE SURGERY      OS eye glaucoma OR    FRACTURE SURGERY Left 05/06/2012    distal radial fracture/Dr. Halima Patrick    HYSTERECTOMY (CERVIX STATUS UNKNOWN)      [place] PERQ VERTEBROPLASTY UNI/BI INJX CERVICOTHORACIC N/A 3/7/2018    T-11, T-12 VERTEBRAL AUGMENTATION performed by Emma Wood MD at [address] [place] [address]         Current Outpatient Medications on File Prior to Visit   Medication Sig Dispense Refill    apixaban (ELIQUIS) 5 MG TABS tablet Take 1 tablet by mouth 2 times daily 60 tablet     dilTIAZem (CARDIZEM CD) 240 MG extended release capsule Take 1 capsule by mouth daily      metoprolol tartrate (LOPRESSOR) 50 MG tablet Take 1 tablet by mouth 2 times daily 60 tablet 0    QUEtiapine (SEROQUEL) 25 MG tablet 25 mg in the morning and 50 mg in the evening      divalproex (DEPAKOTE) 125 MG DR tablet Take 1 tablet by mouth in the morning and at bedtime      mirtazapine (REMERON) 15 MG tablet Take

## 2023-09-27 NOTE — PROGRESS NOTES
Not on file   Occupational History    Not on file   Tobacco Use    Smoking status: Never    Smokeless tobacco: Never   Vaping Use    Vaping Use: Not on file   Substance and Sexual Activity    Alcohol use: No     Comment: denies    Drug use: No    Sexual activity: Never   Other Topics Concern    Not on file   Social History Narrative    Not on file     Social Determinants of Health     Financial Resource Strain: Low Risk  (7/31/2023)    Overall Financial Resource Strain (CARDIA)     Difficulty of Paying Living Expenses: Not hard at all   Food Insecurity: No Food Insecurity (7/31/2023)    Hunger Vital Sign     Worried About Running Out of Food in the Last Year: Never true     Ran Out of Food in the Last Year: Never true   Transportation Needs: No Transportation Needs (7/31/2023)    PRAPARE - Transportation     Lack of Transportation (Medical): No     Lack of Transportation (Non-Medical):  No   Physical Activity: Inactive (7/31/2023)    Exercise Vital Sign     Days of Exercise per Week: 0 days     Minutes of Exercise per Session: 0 min   Stress: Not on file   Social Connections: Not on file   Intimate Partner Violence: Not on file   Housing Stability: Unknown (7/31/2023)    Housing Stability Vital Sign     Unable to Pay for Housing in the Last Year: Not on file     Number of [address] in the Last Year: Not on file     Unstable Housing in the Last Year: No         No results found for: \"LABA1C\"  No results found for: \"EAG\"    Lab Results   Component Value Date/Time     09/19/2023 12:00 AM    K 4.7 09/19/2023 12:00 AM     09/19/2023 12:00 AM    CO2 28 09/19/2023 12:00 AM    BUN 21 09/19/2023 12:00 AM    CREATININE 0.8 09/19/2023 12:00 AM    GLUCOSE 68 09/19/2023 12:00 AM    GLUCOSE 107 05/07/2012 11:55 AM    CALCIUM 8.6 09/19/2023 12:00 AM        Lab Results   Component Value Date    CHOL 170 09/19/2023    CHOL 155 01/01/2019    CHOL 216 (H) 04/05/2013     Lab Results   Component Value Date    TRIG 133

## 2023-10-03 NOTE — PROGRESS NOTES
Abdomen is soft. Tenderness: There is no abdominal tenderness. Comments: No apparent epigastric pain   Skin:     General: Skin is warm and dry. Coloration: Skin is not pale. Findings: No erythema. Neurological:      Mental Status: She is alert. Mental status is at baseline. She is disoriented. Motor: Weakness present. Gait: Gait normal.   Psychiatric:         Mood and Affect: Mood normal.         Behavior: Behavior normal.         Assessment:       Diagnosis Orders   1. History of hematemesis        2. Gastroesophageal reflux disease without esophagitis        3. Dementia with behavioral disturbance ([address])              Plan:      No orders of the defined types were placed in this encounter. No orders of the defined types were placed in this encounter. Maintain patient on apixaban. Monitor for falls and head trauma. Due to recent fall. Continue Depakote as currently ordered. Appears to be very effective for her. Remaining psych meds to be maintained per psych orders. Continue omeprazole. Monitor for changes in abdominal pain or epigastric pain. No follow-ups on file. Radha Church    Electronically signed by: KIRBY Church on 10/3/2023    Please note orders entered on site at facility after discussion with appropriate facility nursing/therapy/ / nutritional staff. Current longstanding medical problems and acute medical issues addressed with staff. Available data and data elements in on site paper chart reviewed and analyzed. Current external consultant notes reviewed in on site chart. Ordered laboratory testing and imaging will be reviewed when available. Side effects, adverse effects of the medication prescribed today, as well as treatment plan and result expectations have been discussed withthe patient who expresses understanding and desires to proceed.     I spent a total of 15 minutes on the date of service which included preparing to see

## 2023-10-23 ENCOUNTER — OFFICE VISIT (OUTPATIENT)
Dept: GERIATRIC MEDICINE | Age: 88
End: 2023-10-23

## 2023-10-23 DIAGNOSIS — Z91.81 HISTORY OF RECENT FALL: Primary | ICD-10-CM

## 2023-10-23 DIAGNOSIS — I95.9 HYPOTENSION, UNSPECIFIED HYPOTENSION TYPE: ICD-10-CM

## 2023-10-23 PROCEDURE — 1123F ACP DISCUSS/DSCN MKR DOCD: CPT | Performed by: PHYSICIAN ASSISTANT

## 2023-10-23 PROCEDURE — 99308 SBSQ NF CARE LOW MDM 20: CPT | Performed by: PHYSICIAN ASSISTANT

## 2023-11-06 ENCOUNTER — OFFICE VISIT (OUTPATIENT)
Dept: GERIATRIC MEDICINE | Age: 88
End: 2023-11-06

## 2023-11-06 DIAGNOSIS — M48.061 SPINAL STENOSIS OF LUMBAR REGION WITH RADICULOPATHY: ICD-10-CM

## 2023-11-06 DIAGNOSIS — M54.16 SPINAL STENOSIS OF LUMBAR REGION WITH RADICULOPATHY: ICD-10-CM

## 2023-11-06 DIAGNOSIS — F03.918 DEMENTIA WITH BEHAVIORAL DISTURBANCE (HCC): ICD-10-CM

## 2023-11-06 DIAGNOSIS — I10 PRIMARY HYPERTENSION: Primary | ICD-10-CM

## 2023-11-08 ENCOUNTER — OFFICE VISIT (OUTPATIENT)
Dept: GERIATRIC MEDICINE | Age: 88
End: 2023-11-08

## 2023-11-08 DIAGNOSIS — Z87.898 HISTORY OF HEMOPTYSIS: ICD-10-CM

## 2023-11-08 DIAGNOSIS — G30.1 MODERATE LATE ONSET ALZHEIMER'S DEMENTIA WITH OTHER BEHAVIORAL DISTURBANCE (HCC): ICD-10-CM

## 2023-11-08 DIAGNOSIS — F02.B18 MODERATE LATE ONSET ALZHEIMER'S DEMENTIA WITH OTHER BEHAVIORAL DISTURBANCE (HCC): ICD-10-CM

## 2023-11-08 DIAGNOSIS — K21.9 GASTROESOPHAGEAL REFLUX DISEASE WITHOUT ESOPHAGITIS: ICD-10-CM

## 2023-11-08 DIAGNOSIS — I48.91 ATRIAL FIBRILLATION WITH RVR (HCC): Primary | ICD-10-CM

## 2023-11-17 ENCOUNTER — HOSPITAL ENCOUNTER (EMERGENCY)
Age: 88
Discharge: HOME | DRG: 689 | End: 2023-11-17
Attending: EMERGENCY MEDICINE
Payer: COMMERCIAL

## 2023-11-17 VITALS
WEIGHT: 137 LBS | DIASTOLIC BLOOD PRESSURE: 66 MMHG | HEIGHT: 63 IN | RESPIRATION RATE: 14 BRPM | HEART RATE: 80 BPM | OXYGEN SATURATION: 97 % | TEMPERATURE: 97.9 F | BODY MASS INDEX: 24.27 KG/M2 | SYSTOLIC BLOOD PRESSURE: 139 MMHG

## 2023-11-17 DIAGNOSIS — R45.1 AGITATION: Primary | ICD-10-CM

## 2023-11-17 LAB
ALBUMIN SERPL-MCNC: 4.3 G/DL (ref 3.5–4.6)
ALP SERPL-CCNC: 55 U/L (ref 40–130)
ALT SERPL-CCNC: 17 U/L (ref 0–33)
AMPHET UR QL SCN: NORMAL
ANION GAP SERPL CALCULATED.3IONS-SCNC: 11 MEQ/L (ref 9–15)
AST SERPL-CCNC: 44 U/L (ref 0–35)
BACTERIA URNS QL MICRO: NEGATIVE /HPF
BARBITURATES UR QL SCN: NORMAL
BASOPHILS # BLD: 0 K/UL (ref 0–0.2)
BASOPHILS NFR BLD: 0.6 %
BENZODIAZ UR QL SCN: NORMAL
BILIRUB SERPL-MCNC: 0.3 MG/DL (ref 0.2–0.7)
BILIRUB UR QL STRIP: NEGATIVE
BUN SERPL-MCNC: 18 MG/DL (ref 8–23)
CALCIUM SERPL-MCNC: 9.2 MG/DL (ref 8.5–9.9)
CANNABINOIDS UR QL SCN: NORMAL
CHLORIDE SERPL-SCNC: 100 MEQ/L (ref 95–107)
CLARITY UR: CLEAR
CO2 SERPL-SCNC: 27 MEQ/L (ref 20–31)
COCAINE UR QL SCN: NORMAL
COLOR UR: YELLOW
CREAT SERPL-MCNC: 0.68 MG/DL (ref 0.5–0.9)
DRUG SCREEN COMMENT UR-IMP: NORMAL
EOSINOPHIL # BLD: 0.1 K/UL (ref 0–0.7)
EOSINOPHIL NFR BLD: 2 %
EPI CELLS #/AREA URNS AUTO: NORMAL /HPF (ref 0–5)
ERYTHROCYTE [DISTWIDTH] IN BLOOD BY AUTOMATED COUNT: 14.2 % (ref 11.5–14.5)
ETHANOL PERCENT: NORMAL G/DL
ETHANOLAMINE SERPL-MCNC: <10 MG/DL (ref 0–0.08)
FENTANYL SCREEN, URINE: NORMAL
GLOBULIN SER CALC-MCNC: 3.1 G/DL (ref 2.3–3.5)
GLUCOSE SERPL-MCNC: 104 MG/DL (ref 70–99)
GLUCOSE UR STRIP-MCNC: NEGATIVE MG/DL
HCT VFR BLD AUTO: 51.3 % (ref 37–47)
HGB BLD-MCNC: 16.7 G/DL (ref 12–16)
HGB UR QL STRIP: NEGATIVE
HYALINE CASTS #/AREA URNS AUTO: NORMAL /HPF (ref 0–5)
KETONES UR STRIP-MCNC: ABNORMAL MG/DL
LEUKOCYTE ESTERASE UR QL STRIP: ABNORMAL
LYMPHOCYTES # BLD: 2.8 K/UL (ref 1–4.8)
LYMPHOCYTES NFR BLD: 40.1 %
MCH RBC QN AUTO: 31.9 PG (ref 27–31.3)
MCHC RBC AUTO-ENTMCNC: 32.6 % (ref 33–37)
MCV RBC AUTO: 97.9 FL (ref 79.4–94.8)
METHADONE UR QL SCN: NORMAL
MONOCYTES # BLD: 1 K/UL (ref 0.2–0.8)
MONOCYTES NFR BLD: 15 %
NEUTROPHILS # BLD: 2.9 K/UL (ref 1.4–6.5)
NEUTS SEG NFR BLD: 41.9 %
NITRITE UR QL STRIP: NEGATIVE
OPIATES UR QL SCN: NORMAL
OXYCODONE UR QL SCN: NORMAL
PCP UR QL SCN: NORMAL
PH UR STRIP: 6 [PH] (ref 5–9)
PLATELET # BLD AUTO: 226 K/UL (ref 130–400)
POTASSIUM SERPL-SCNC: 5.9 MEQ/L (ref 3.4–4.9)
PROPOXYPH UR QL SCN: NORMAL
PROT SERPL-MCNC: 7.4 G/DL (ref 6.3–8)
PROT UR STRIP-MCNC: NEGATIVE MG/DL
RBC # BLD AUTO: 5.24 M/UL (ref 4.2–5.4)
RBC #/AREA URNS AUTO: NORMAL /HPF (ref 0–5)
SODIUM SERPL-SCNC: 138 MEQ/L (ref 135–144)
SP GR UR STRIP: 1.02 (ref 1–1.03)
URINE REFLEX TO CULTURE: ABNORMAL
UROBILINOGEN UR STRIP-ACNC: 0.2 E.U./DL
WBC # BLD AUTO: 6.9 K/UL (ref 4.8–10.8)
WBC #/AREA URNS AUTO: NORMAL /HPF (ref 0–5)

## 2023-11-17 PROCEDURE — 80307 DRUG TEST PRSMV CHEM ANLYZR: CPT

## 2023-11-17 PROCEDURE — 99284 EMERGENCY DEPT VISIT MOD MDM: CPT

## 2023-11-17 PROCEDURE — 6360000002 HC RX W HCPCS: Performed by: EMERGENCY MEDICINE

## 2023-11-17 PROCEDURE — 96372 THER/PROPH/DIAG INJ SC/IM: CPT

## 2023-11-17 PROCEDURE — 82077 ASSAY SPEC XCP UR&BREATH IA: CPT

## 2023-11-17 PROCEDURE — 36415 COLL VENOUS BLD VENIPUNCTURE: CPT

## 2023-11-17 PROCEDURE — 81001 URINALYSIS AUTO W/SCOPE: CPT

## 2023-11-17 PROCEDURE — 85025 COMPLETE CBC W/AUTO DIFF WBC: CPT

## 2023-11-17 PROCEDURE — 80053 COMPREHEN METABOLIC PANEL: CPT

## 2023-11-17 RX ORDER — LORAZEPAM 2 MG/ML
2 INJECTION INTRAMUSCULAR ONCE
Status: COMPLETED | OUTPATIENT
Start: 2023-11-17 | End: 2023-11-17

## 2023-11-17 RX ORDER — HALOPERIDOL 5 MG/ML
5 INJECTION INTRAMUSCULAR ONCE
Status: COMPLETED | OUTPATIENT
Start: 2023-11-17 | End: 2023-11-17

## 2023-11-17 RX ADMIN — LORAZEPAM 2 MG: 2 INJECTION INTRAMUSCULAR; INTRAVENOUS at 19:56

## 2023-11-17 RX ADMIN — HALOPERIDOL LACTATE 5 MG: 5 INJECTION, SOLUTION INTRAMUSCULAR at 19:55

## 2023-11-17 ASSESSMENT — LIFESTYLE VARIABLES
HOW MANY STANDARD DRINKS CONTAINING ALCOHOL DO YOU HAVE ON A TYPICAL DAY: PATIENT DOES NOT DRINK
HOW OFTEN DO YOU HAVE A DRINK CONTAINING ALCOHOL: NEVER

## 2023-11-17 ASSESSMENT — PAIN - FUNCTIONAL ASSESSMENT: PAIN_FUNCTIONAL_ASSESSMENT: NONE - DENIES PAIN

## 2023-11-20 ENCOUNTER — APPOINTMENT (OUTPATIENT)
Dept: CT IMAGING | Age: 88
DRG: 689 | End: 2023-11-20
Payer: COMMERCIAL

## 2023-11-20 ENCOUNTER — HOSPITAL ENCOUNTER (INPATIENT)
Age: 88
LOS: 4 days | Discharge: NURSING, REHAB OR LONG-TERM CARE | DRG: 689 | End: 2023-11-24
Attending: INTERNAL MEDICINE | Admitting: INTERNAL MEDICINE
Payer: COMMERCIAL

## 2023-11-20 ENCOUNTER — APPOINTMENT (OUTPATIENT)
Dept: GENERAL RADIOLOGY | Age: 88
DRG: 689 | End: 2023-11-20
Payer: COMMERCIAL

## 2023-11-20 DIAGNOSIS — R41.0 DISORIENTATION: Primary | ICD-10-CM

## 2023-11-20 DIAGNOSIS — N39.0 URINARY TRACT INFECTION WITHOUT HEMATURIA, SITE UNSPECIFIED: ICD-10-CM

## 2023-11-20 LAB
ALBUMIN SERPL-MCNC: 3.6 G/DL (ref 3.5–4.6)
ALP SERPL-CCNC: 71 U/L (ref 40–130)
ALT SERPL-CCNC: 11 U/L (ref 0–33)
ANION GAP SERPL CALCULATED.3IONS-SCNC: 15 MEQ/L (ref 9–15)
APTT PPP: 29.2 SEC (ref 24.4–36.8)
AST SERPL-CCNC: 19 U/L (ref 0–35)
BACTERIA URNS QL MICRO: NEGATIVE /HPF
BASOPHILS # BLD: 0 K/UL (ref 0–0.2)
BASOPHILS NFR BLD: 0.2 %
BILIRUB SERPL-MCNC: 0.8 MG/DL (ref 0.2–0.7)
BILIRUB UR QL STRIP: NEGATIVE
BUN SERPL-MCNC: 14 MG/DL (ref 8–23)
CALCIUM SERPL-MCNC: 8.9 MG/DL (ref 8.5–9.9)
CHLORIDE SERPL-SCNC: 100 MEQ/L (ref 95–107)
CHP ED QC CHECK: YES
CHP ED QC CHECK: YES
CLARITY UR: CLEAR
CO2 SERPL-SCNC: 23 MEQ/L (ref 20–31)
COLOR UR: YELLOW
CREAT SERPL-MCNC: 0.62 MG/DL (ref 0.5–0.9)
EOSINOPHIL # BLD: 0 K/UL (ref 0–0.7)
EOSINOPHIL NFR BLD: 0 %
EPI CELLS #/AREA URNS AUTO: ABNORMAL /HPF (ref 0–5)
ERYTHROCYTE [DISTWIDTH] IN BLOOD BY AUTOMATED COUNT: 13.7 % (ref 11.5–14.5)
GLOBULIN SER CALC-MCNC: 3.5 G/DL (ref 2.3–3.5)
GLUCOSE BLD-MCNC: 103 MG/DL
GLUCOSE BLD-MCNC: 103 MG/DL
GLUCOSE BLD-MCNC: 103 MG/DL (ref 70–99)
GLUCOSE SERPL-MCNC: 115 MG/DL (ref 70–99)
GLUCOSE UR STRIP-MCNC: NEGATIVE MG/DL
HCT VFR BLD AUTO: 55.4 % (ref 37–47)
HGB BLD-MCNC: 19.1 G/DL (ref 12–16)
HGB UR QL STRIP: ABNORMAL
HYALINE CASTS #/AREA URNS AUTO: ABNORMAL /HPF (ref 0–5)
INR PPP: 1.4
KETONES UR STRIP-MCNC: 15 MG/DL
LACTATE BLDV-SCNC: 1.8 MMOL/L (ref 0.5–2.2)
LACTATE BLDV-SCNC: 2.3 MMOL/L (ref 0.5–2.2)
LEUKOCYTE ESTERASE UR QL STRIP: ABNORMAL
LYMPHOCYTES # BLD: 1.2 K/UL (ref 1–4.8)
LYMPHOCYTES NFR BLD: 4 %
MAGNESIUM SERPL-MCNC: 1.7 MG/DL (ref 1.7–2.4)
MCH RBC QN AUTO: 31.9 PG (ref 27–31.3)
MCHC RBC AUTO-ENTMCNC: 34.5 % (ref 33–37)
MCV RBC AUTO: 92.5 FL (ref 79.4–94.8)
MONOCYTES # BLD: 1.5 K/UL (ref 0.2–0.8)
MONOCYTES NFR BLD: 8.6 %
NEUTROPHILS # BLD: 14.3 K/UL (ref 1.4–6.5)
NEUTS SEG NFR BLD: 85 %
NITRITE UR QL STRIP: NEGATIVE
PERFORMED ON: ABNORMAL
PH UR STRIP: 6.5 [PH] (ref 5–9)
PLATELET # BLD AUTO: 238 K/UL (ref 130–400)
PLATELET BLD QL SMEAR: ADEQUATE
POC CREATININE WHOLE BLOOD: 0.6
POTASSIUM SERPL-SCNC: 3.4 MEQ/L (ref 3.4–4.9)
PROCALCITONIN SERPL IA-MCNC: 0.07 NG/ML (ref 0–0.15)
PROT SERPL-MCNC: 7.1 G/DL (ref 6.3–8)
PROT UR STRIP-MCNC: ABNORMAL MG/DL
PROTHROMBIN TIME: 17.4 SEC (ref 12.3–14.9)
RBC # BLD AUTO: 5.99 M/UL (ref 4.2–5.4)
RBC #/AREA URNS AUTO: ABNORMAL /HPF (ref 0–5)
RBC MORPH BLD: NORMAL
SLIDE REVIEW: ABNORMAL
SODIUM SERPL-SCNC: 138 MEQ/L (ref 135–144)
SP GR UR STRIP: 1.03 (ref 1–1.03)
TROPONIN, HIGH SENSITIVITY: 13 NG/L (ref 0–19)
TROPONIN, HIGH SENSITIVITY: 17 NG/L (ref 0–19)
TROPONIN, HIGH SENSITIVITY: 18 NG/L (ref 0–19)
URINE REFLEX TO CULTURE: YES
UROBILINOGEN UR STRIP-ACNC: 0.2 E.U./DL
VARIANT LYMPHS NFR BLD: 3 %
WBC # BLD AUTO: 16.8 K/UL (ref 4.8–10.8)
WBC #/AREA URNS AUTO: >100 /HPF (ref 0–5)

## 2023-11-20 PROCEDURE — 84145 PROCALCITONIN (PCT): CPT

## 2023-11-20 PROCEDURE — 70450 CT HEAD/BRAIN W/O DYE: CPT

## 2023-11-20 PROCEDURE — 2580000003 HC RX 258: Performed by: INTERNAL MEDICINE

## 2023-11-20 PROCEDURE — 6360000002 HC RX W HCPCS: Performed by: INTERNAL MEDICINE

## 2023-11-20 PROCEDURE — 84484 ASSAY OF TROPONIN QUANT: CPT

## 2023-11-20 PROCEDURE — 2580000003 HC RX 258: Performed by: PHYSICIAN ASSISTANT

## 2023-11-20 PROCEDURE — 6360000002 HC RX W HCPCS: Performed by: PHYSICIAN ASSISTANT

## 2023-11-20 PROCEDURE — 85025 COMPLETE CBC W/AUTO DIFF WBC: CPT

## 2023-11-20 PROCEDURE — 96375 TX/PRO/DX INJ NEW DRUG ADDON: CPT

## 2023-11-20 PROCEDURE — 96374 THER/PROPH/DIAG INJ IV PUSH: CPT

## 2023-11-20 PROCEDURE — 2500000003 HC RX 250 WO HCPCS: Performed by: PHYSICIAN ASSISTANT

## 2023-11-20 PROCEDURE — 80053 COMPREHEN METABOLIC PANEL: CPT

## 2023-11-20 PROCEDURE — 72125 CT NECK SPINE W/O DYE: CPT

## 2023-11-20 PROCEDURE — 71045 X-RAY EXAM CHEST 1 VIEW: CPT

## 2023-11-20 PROCEDURE — 87040 BLOOD CULTURE FOR BACTERIA: CPT

## 2023-11-20 PROCEDURE — 6360000004 HC RX CONTRAST MEDICATION: Performed by: PHYSICIAN ASSISTANT

## 2023-11-20 PROCEDURE — 70496 CT ANGIOGRAPHY HEAD: CPT

## 2023-11-20 PROCEDURE — 85730 THROMBOPLASTIN TIME PARTIAL: CPT

## 2023-11-20 PROCEDURE — 87086 URINE CULTURE/COLONY COUNT: CPT

## 2023-11-20 PROCEDURE — 70498 CT ANGIOGRAPHY NECK: CPT

## 2023-11-20 PROCEDURE — 83605 ASSAY OF LACTIC ACID: CPT

## 2023-11-20 PROCEDURE — 2500000003 HC RX 250 WO HCPCS: Performed by: INTERNAL MEDICINE

## 2023-11-20 PROCEDURE — 83735 ASSAY OF MAGNESIUM: CPT

## 2023-11-20 PROCEDURE — 36415 COLL VENOUS BLD VENIPUNCTURE: CPT

## 2023-11-20 PROCEDURE — 1210000000 HC MED SURG R&B

## 2023-11-20 PROCEDURE — 93005 ELECTROCARDIOGRAM TRACING: CPT | Performed by: PHYSICIAN ASSISTANT

## 2023-11-20 PROCEDURE — 85610 PROTHROMBIN TIME: CPT

## 2023-11-20 PROCEDURE — 99285 EMERGENCY DEPT VISIT HI MDM: CPT

## 2023-11-20 PROCEDURE — 81001 URINALYSIS AUTO W/SCOPE: CPT

## 2023-11-20 RX ORDER — ONDANSETRON 2 MG/ML
4 INJECTION INTRAMUSCULAR; INTRAVENOUS EVERY 6 HOURS PRN
Status: DISCONTINUED | OUTPATIENT
Start: 2023-11-20 | End: 2023-11-25 | Stop reason: HOSPADM

## 2023-11-20 RX ORDER — ENOXAPARIN SODIUM 100 MG/ML
1 INJECTION SUBCUTANEOUS 2 TIMES DAILY
Status: DISCONTINUED | OUTPATIENT
Start: 2023-11-20 | End: 2023-11-24 | Stop reason: ALTCHOICE

## 2023-11-20 RX ORDER — DILTIAZEM HYDROCHLORIDE 5 MG/ML
15 INJECTION INTRAVENOUS ONCE
Status: COMPLETED | OUTPATIENT
Start: 2023-11-20 | End: 2023-11-20

## 2023-11-20 RX ORDER — 0.9 % SODIUM CHLORIDE 0.9 %
1000 INTRAVENOUS SOLUTION INTRAVENOUS ONCE
Status: COMPLETED | OUTPATIENT
Start: 2023-11-20 | End: 2023-11-20

## 2023-11-20 RX ORDER — POLYETHYLENE GLYCOL 3350 17 G/17G
17 POWDER, FOR SOLUTION ORAL DAILY PRN
Status: DISCONTINUED | OUTPATIENT
Start: 2023-11-20 | End: 2023-11-25 | Stop reason: HOSPADM

## 2023-11-20 RX ORDER — SODIUM CHLORIDE 0.9 % (FLUSH) 0.9 %
10 SYRINGE (ML) INJECTION PRN
Status: DISCONTINUED | OUTPATIENT
Start: 2023-11-20 | End: 2023-11-25 | Stop reason: HOSPADM

## 2023-11-20 RX ORDER — SODIUM CHLORIDE 0.9 % (FLUSH) 0.9 %
10 SYRINGE (ML) INJECTION EVERY 12 HOURS SCHEDULED
Status: DISCONTINUED | OUTPATIENT
Start: 2023-11-20 | End: 2023-11-25 | Stop reason: HOSPADM

## 2023-11-20 RX ORDER — ACETAMINOPHEN 650 MG/1
650 SUPPOSITORY RECTAL EVERY 6 HOURS PRN
Status: DISCONTINUED | OUTPATIENT
Start: 2023-11-20 | End: 2023-11-25 | Stop reason: HOSPADM

## 2023-11-20 RX ORDER — SODIUM CHLORIDE 9 MG/ML
INJECTION, SOLUTION INTRAVENOUS PRN
Status: DISCONTINUED | OUTPATIENT
Start: 2023-11-20 | End: 2023-11-25 | Stop reason: HOSPADM

## 2023-11-20 RX ORDER — SODIUM CHLORIDE, SODIUM LACTATE, POTASSIUM CHLORIDE, CALCIUM CHLORIDE 600; 310; 30; 20 MG/100ML; MG/100ML; MG/100ML; MG/100ML
INJECTION, SOLUTION INTRAVENOUS CONTINUOUS
Status: DISCONTINUED | OUTPATIENT
Start: 2023-11-20 | End: 2023-11-25 | Stop reason: HOSPADM

## 2023-11-20 RX ORDER — ONDANSETRON 4 MG/1
4 TABLET, ORALLY DISINTEGRATING ORAL EVERY 8 HOURS PRN
Status: DISCONTINUED | OUTPATIENT
Start: 2023-11-20 | End: 2023-11-25 | Stop reason: HOSPADM

## 2023-11-20 RX ORDER — METOPROLOL TARTRATE 1 MG/ML
5 INJECTION, SOLUTION INTRAVENOUS ONCE
Status: COMPLETED | OUTPATIENT
Start: 2023-11-20 | End: 2023-11-20

## 2023-11-20 RX ORDER — ENOXAPARIN SODIUM 100 MG/ML
40 INJECTION SUBCUTANEOUS DAILY
Status: DISCONTINUED | OUTPATIENT
Start: 2023-11-20 | End: 2023-11-20

## 2023-11-20 RX ORDER — ACETAMINOPHEN 325 MG/1
650 TABLET ORAL EVERY 6 HOURS PRN
Status: DISCONTINUED | OUTPATIENT
Start: 2023-11-20 | End: 2023-11-25 | Stop reason: HOSPADM

## 2023-11-20 RX ADMIN — Medication 10 ML: at 20:26

## 2023-11-20 RX ADMIN — SODIUM CHLORIDE, POTASSIUM CHLORIDE, SODIUM LACTATE AND CALCIUM CHLORIDE: 600; 310; 30; 20 INJECTION, SOLUTION INTRAVENOUS at 20:26

## 2023-11-20 RX ADMIN — ENOXAPARIN SODIUM 60 MG: 100 INJECTION SUBCUTANEOUS at 20:24

## 2023-11-20 RX ADMIN — DILTIAZEM HYDROCHLORIDE 15 MG: 5 INJECTION INTRAVENOUS at 17:03

## 2023-11-20 RX ADMIN — IOPAMIDOL 75 ML: 612 INJECTION, SOLUTION INTRAVENOUS at 15:35

## 2023-11-20 RX ADMIN — CEFTRIAXONE SODIUM 1000 MG: 1 INJECTION, POWDER, FOR SOLUTION INTRAMUSCULAR; INTRAVENOUS at 17:30

## 2023-11-20 RX ADMIN — DILTIAZEM HYDROCHLORIDE 5 MG/HR: 5 INJECTION, SOLUTION INTRAVENOUS at 20:29

## 2023-11-20 RX ADMIN — METOPROLOL TARTRATE 5 MG: 5 INJECTION, SOLUTION INTRAVENOUS at 15:20

## 2023-11-20 RX ADMIN — SODIUM CHLORIDE 1000 ML: 9 INJECTION, SOLUTION INTRAVENOUS at 17:30

## 2023-11-20 ASSESSMENT — ENCOUNTER SYMPTOMS
SORE THROAT: 0
COLOR CHANGE: 0
ABDOMINAL PAIN: 0
CONSTIPATION: 0
SHORTNESS OF BREATH: 0
RHINORRHEA: 0
ABDOMINAL DISTENTION: 0
EYE DISCHARGE: 0

## 2023-11-20 ASSESSMENT — PAIN SCALES - GENERAL
PAINLEVEL_OUTOF10: 0
PAINLEVEL_OUTOF10: 0

## 2023-11-20 ASSESSMENT — PAIN SCALES - PAIN ASSESSMENT IN ADVANCED DEMENTIA (PAINAD)
BREATHING: 0
NEGVOCALIZATION: 0
CONSOLABILITY: 0
FACIALEXPRESSION: 0
TOTALSCORE: 0
BODYLANGUAGE: 0

## 2023-11-20 NOTE — ED TRIAGE NOTES
Pt presents to ED after altered mental status since Friday. Pt daughter stated she had an unwitnessed fall on Saturday. Pt daughter states this is not her normal baseline. Pt denies pain at this time. Pt states she has some dizziness. EMS placed C-Collar on pt. Lungs are clear. Daughter at bedside.

## 2023-11-20 NOTE — H&P
11/20/2023  EXAMINATION: CTA OF THE HEAD WITHOUT AND WITH CONTRAST; CTA OF THE NECK 11/20/2023 3:31 pm: TECHNIQUE: CTA of the head/brain was performed without and with the administration of intravenous contrast. Multiplanar reformatted images are provided for review. MIP images are provided for review. Automated exposure control, iterative reconstruction, and/or weight based adjustment of the mA/kV was utilized to reduce the radiation dose to as low as reasonably achievable.; CTA of the neck was performed with the administration of intravenous contrast. Multiplanar reformatted images are provided for review. MIP images are provided for review. Stenosis of the internal carotid arteries measured using NASCET criteria. Automated exposure control, iterative reconstruction, and/or weight based adjustment of the mA/kV was utilized to reduce the radiation dose to as low as reasonably achievable. COMPARISON: CT head from earlier today HISTORY: ORDERING SYSTEM PROVIDED HISTORY: altered mental status TECHNOLOGIST PROVIDED HISTORY: Reason for exam:->altered mental status Decision Support Exception - unselect if not a suspected or confirmed emergency medical condition->Emergency Medical Condition (MA) What reading provider will be dictating this exam?->CRC FINDINGS: CTA NECK: AORTIC ARCH/ARCH VESSELS: No dissection or arterial injury. No significant stenosis of the brachiocephalic or subclavian arteries. CAROTID ARTERIES: No dissection, arterial injury, or hemodynamically significant stenosis by NASCET criteria. VERTEBRAL ARTERIES: No dissection, arterial injury, or significant stenosis. SOFT TISSUES: There is mild dependent atelectasis at the bilateral posterior lungs. No cervical or superior mediastinal lymphadenopathy. The larynx and pharynx are unremarkable. No acute abnormality of the salivary and thyroid glands. BONES: No acute osseous abnormality.  CTA HEAD: ANTERIOR CIRCULATION: There is 30% stenosis in the proximal of both knees M25.561, M25.562, G89.29    Height loss R29.890    Hypercalcemia E83.52    Joint stiffness of multiple sites M25.60    Multiple closed fractures of ribs of left side S22.42XA    Opioid dependence, daily use ([facility]) F11.20    Secondary osteoarthritis of multiple sites M15.3    Wedge compression fracture of T11-T12 vertebra, initial encounter for closed fracture ([facility]) S22.080A    Rhabdomyolysis M62.82    COVID-19 virus infection U07.1    Uterine procidentia N81.3       Mila Wang MD, MD  Admitting Hospitalist    Emergency Contact:

## 2023-11-20 NOTE — ACP (ADVANCE CARE PLANNING)
Advance Care Planning   Healthcare Decision Maker:    Primary Decision Maker: Senia Rodriguez Child - [phone]    Secondary Decision Maker: Charlie Moore - Child - [phone]    Click here to complete Healthcare Decision Makers including selection of the Healthcare Decision Maker Relationship (ie \"Primary\"). Confirmed above w dtr Carlene, no POA document on file, she will bring in document.      Electronically signed by Ryan Nunes RN, BSN on 11/20/2023 at 5:58 PM

## 2023-11-20 NOTE — CARE COORDINATION
comment) (UNABLE TO ASSESS)      Family can provide assistance at DC: Other (comment) (SNF STATUS LONGTERM CARE)  Would you like Case Management to discuss the discharge plan with any other family members/significant others, and if so, who? Yes (DTR WINSOME AT BEDSIDE PROVIDING HISTORY)  Plans to Return to Present Housing: Unknown at present  Other Identified Issues/Barriers to RETURNING to current housing: medical barriers  Potential Assistance needed at discharge:              Potential DME:  N/A  Patient expects to discharge to: Long-term care  Plan for transportation at discharge:  BD    Financial    Payor: [address] [place] [address] / Plan: [address] [place] [address] / Product Type: *No Product type* /     Does insurance require precert for SNF: Yes    Potential assistance Purchasing Medications: No  Meds-to-Beds request:  N/A      [place], [place] - [facility]  [address] [facility]  Phone: [phone] Fax: [phone]    [address] [place] [address] [place] [phone] -  [phone]  [address] [place] [address]  Phone: [phone] Fax: [phone]            Notes:    Factors facilitating achievement of predicted outcomes: Family support    Barriers to discharge: Long standing deficits, Medical complications, Medication managment, and AGE & DEMENTIA DX    Additional Case Management Notes: FROM [facility]. BASELINE PER DTR DEMENTIA A&0 X 1. MOBILIZES W WALKER INDEPENDENTLY. HAS GONE [place] IN THE PAST WEEK. IS ABLE TO FEED SELF. DTRS WINSOME & GISELA POA'S WILL BRING IN DOCUMENT. D/C PLAN DTR WANTS TO HAVE HER RETURN TO [facility].            The Plan for Transition of Care is related to the following treatment goals of Atrial fibrillation with RVR ([address]) [P27.51]    IF APPLICABLE: The Patient and/or patient representative Carlin and her family were provided with a choice of provider and agrees with the discharge plan. Freedom of choice list with basic dialogue that supports the patient's individualized plan of care/goals and shares the quality data associated with the providers was provided to: Patient Representative   Patient Representative Name: DTR WINSOME     The Patient and/or Patient Representative Agree with the Discharge Plan?  Yes (RETURN TO [facility])    Tristan Staton RN, BSN  Case Management Department

## 2023-11-20 NOTE — ED PROVIDER NOTES
was  minutes, excluding separately reportable procedures. There was a high probability of clinically significant/life threatening deterioration in the patient's condition which required my urgent intervention. CONSULTS:  None    PROCEDURES:  Unless otherwise noted below, none     Procedures        FINAL IMPRESSION      1. Disorientation    2. Urinary tract infection without hematuria, site unspecified          DISPOSITION/PLAN   DISPOSITION Admitted 11/20/2023 05:31:57 PM      PATIENT REFERRED TO:  No follow-up provider specified. DISCHARGE MEDICATIONS:  New Prescriptions    No medications on file     Controlled Substances Monitoring:     RX Monitoring Attestation   11/7/2017  12:32 PM The Prescription Monitoring Report for this patient was reviewed today. (Please note that portions of this note were completed with a voice recognition program.  Efforts were made to edit the dictations but occasionally words are mis-transcribed.)    Lillian Juarez PA-C (electronically signed)  Attending Emergency Physician    Supervising Attending Physician: Dr. Claudine Oneal.       Lillian Juarez PA-C  11/20/23 [id number]

## 2023-11-21 LAB
ANION GAP SERPL CALCULATED.3IONS-SCNC: 14 MEQ/L (ref 9–15)
BASOPHILS # BLD: 0 K/UL (ref 0–0.2)
BASOPHILS NFR BLD: 0.2 %
BUN SERPL-MCNC: 13 MG/DL (ref 8–23)
CALCIUM SERPL-MCNC: 8.6 MG/DL (ref 8.5–9.9)
CHLORIDE SERPL-SCNC: 102 MEQ/L (ref 95–107)
CO2 SERPL-SCNC: 23 MEQ/L (ref 20–31)
CREAT SERPL-MCNC: 0.54 MG/DL (ref 0.5–0.9)
EKG ATRIAL RATE: 133 BPM
EKG Q-T INTERVAL: 334 MS
EKG QRS DURATION: 82 MS
EKG QTC CALCULATION (BAZETT): 497 MS
EKG R AXIS: 36 DEGREES
EKG T AXIS: -53 DEGREES
EKG VENTRICULAR RATE: 133 BPM
EOSINOPHIL # BLD: 0 K/UL (ref 0–0.7)
EOSINOPHIL NFR BLD: 0 %
ERYTHROCYTE [DISTWIDTH] IN BLOOD BY AUTOMATED COUNT: 13.9 % (ref 11.5–14.5)
GLUCOSE BLD-MCNC: 92 MG/DL (ref 70–99)
GLUCOSE SERPL-MCNC: 103 MG/DL (ref 70–99)
HCT VFR BLD AUTO: 51.1 % (ref 37–47)
HGB BLD-MCNC: 17.7 G/DL (ref 12–16)
LYMPHOCYTES # BLD: 2.1 K/UL (ref 1–4.8)
LYMPHOCYTES NFR BLD: 14.3 %
MAGNESIUM SERPL-MCNC: 1.8 MG/DL (ref 1.7–2.4)
MCH RBC QN AUTO: 31.9 PG (ref 27–31.3)
MCHC RBC AUTO-ENTMCNC: 34.6 % (ref 33–37)
MCV RBC AUTO: 92.1 FL (ref 79.4–94.8)
MONOCYTES # BLD: 2.3 K/UL (ref 0.2–0.8)
MONOCYTES NFR BLD: 15.8 %
NEUTROPHILS # BLD: 10.2 K/UL (ref 1.4–6.5)
NEUTS SEG NFR BLD: 69.2 %
PERFORMED ON: NORMAL
PLATELET # BLD AUTO: 220 K/UL (ref 130–400)
POTASSIUM SERPL-SCNC: 3.2 MEQ/L (ref 3.4–4.9)
RBC # BLD AUTO: 5.55 M/UL (ref 4.2–5.4)
SODIUM SERPL-SCNC: 139 MEQ/L (ref 135–144)
WBC # BLD AUTO: 14.8 K/UL (ref 4.8–10.8)

## 2023-11-21 PROCEDURE — 99223 1ST HOSP IP/OBS HIGH 75: CPT | Performed by: INTERNAL MEDICINE

## 2023-11-21 PROCEDURE — 85025 COMPLETE CBC W/AUTO DIFF WBC: CPT

## 2023-11-21 PROCEDURE — 2580000003 HC RX 258: Performed by: INTERNAL MEDICINE

## 2023-11-21 PROCEDURE — 97166 OT EVAL MOD COMPLEX 45 MIN: CPT

## 2023-11-21 PROCEDURE — 6370000000 HC RX 637 (ALT 250 FOR IP): Performed by: INTERNAL MEDICINE

## 2023-11-21 PROCEDURE — 1210000000 HC MED SURG R&B

## 2023-11-21 PROCEDURE — 80048 BASIC METABOLIC PNL TOTAL CA: CPT

## 2023-11-21 PROCEDURE — 92610 EVALUATE SWALLOWING FUNCTION: CPT

## 2023-11-21 PROCEDURE — 6360000002 HC RX W HCPCS: Performed by: INTERNAL MEDICINE

## 2023-11-21 PROCEDURE — 83735 ASSAY OF MAGNESIUM: CPT

## 2023-11-21 PROCEDURE — 2580000003 HC RX 258: Performed by: PSYCHIATRY & NEUROLOGY

## 2023-11-21 PROCEDURE — 87529 HSV DNA AMP PROBE: CPT

## 2023-11-21 PROCEDURE — 2500000003 HC RX 250 WO HCPCS: Performed by: INTERNAL MEDICINE

## 2023-11-21 PROCEDURE — 36415 COLL VENOUS BLD VENIPUNCTURE: CPT

## 2023-11-21 RX ORDER — METOPROLOL TARTRATE 50 MG/1
50 TABLET, FILM COATED ORAL 2 TIMES DAILY
Status: DISCONTINUED | OUTPATIENT
Start: 2023-11-21 | End: 2023-11-25 | Stop reason: HOSPADM

## 2023-11-21 RX ORDER — SODIUM CHLORIDE 0.9 % (FLUSH) 0.9 %
5-40 SYRINGE (ML) INJECTION EVERY 12 HOURS SCHEDULED
Status: DISCONTINUED | OUTPATIENT
Start: 2023-11-21 | End: 2023-11-25 | Stop reason: HOSPADM

## 2023-11-21 RX ORDER — LANOLIN ALCOHOL/MO/W.PET/CERES
400 CREAM (GRAM) TOPICAL DAILY
Status: DISCONTINUED | OUTPATIENT
Start: 2023-11-21 | End: 2023-11-25 | Stop reason: HOSPADM

## 2023-11-21 RX ORDER — DIVALPROEX SODIUM 125 MG/1
125 TABLET, DELAYED RELEASE ORAL 2 TIMES DAILY
Status: DISCONTINUED | OUTPATIENT
Start: 2023-11-21 | End: 2023-11-25 | Stop reason: HOSPADM

## 2023-11-21 RX ORDER — AMIODARONE HYDROCHLORIDE 200 MG/1
200 TABLET ORAL EVERY 8 HOURS SCHEDULED
Status: DISCONTINUED | OUTPATIENT
Start: 2023-11-21 | End: 2023-11-22

## 2023-11-21 RX ORDER — SODIUM CHLORIDE 0.9 % (FLUSH) 0.9 %
5-40 SYRINGE (ML) INJECTION PRN
Status: DISCONTINUED | OUTPATIENT
Start: 2023-11-21 | End: 2023-11-25 | Stop reason: HOSPADM

## 2023-11-21 RX ORDER — QUETIAPINE FUMARATE 50 MG/1
50 TABLET, FILM COATED ORAL EVERY 24 HOURS
Status: DISCONTINUED | OUTPATIENT
Start: 2023-11-21 | End: 2023-11-21

## 2023-11-21 RX ORDER — DILTIAZEM HYDROCHLORIDE 240 MG/1
240 CAPSULE, COATED, EXTENDED RELEASE ORAL DAILY
Status: DISCONTINUED | OUTPATIENT
Start: 2023-11-21 | End: 2023-11-25 | Stop reason: HOSPADM

## 2023-11-21 RX ORDER — POTASSIUM CHLORIDE 20 MEQ/1
40 TABLET, EXTENDED RELEASE ORAL 2 TIMES DAILY WITH MEALS
Status: DISPENSED | OUTPATIENT
Start: 2023-11-21 | End: 2023-11-22

## 2023-11-21 RX ORDER — MIRTAZAPINE 15 MG/1
7.5 TABLET, FILM COATED ORAL NIGHTLY
Status: DISCONTINUED | OUTPATIENT
Start: 2023-11-21 | End: 2023-11-25 | Stop reason: HOSPADM

## 2023-11-21 RX ORDER — SODIUM CHLORIDE 9 MG/ML
INJECTION, SOLUTION INTRAVENOUS PRN
Status: DISCONTINUED | OUTPATIENT
Start: 2023-11-21 | End: 2023-11-25 | Stop reason: HOSPADM

## 2023-11-21 RX ORDER — QUETIAPINE FUMARATE 50 MG/1
25 TABLET, FILM COATED ORAL EVERY MORNING
Status: DISCONTINUED | OUTPATIENT
Start: 2023-11-22 | End: 2023-11-21

## 2023-11-21 RX ADMIN — SODIUM CHLORIDE, POTASSIUM CHLORIDE, SODIUM LACTATE AND CALCIUM CHLORIDE: 600; 310; 30; 20 INJECTION, SOLUTION INTRAVENOUS at 20:30

## 2023-11-21 RX ADMIN — SODIUM CHLORIDE, POTASSIUM CHLORIDE, SODIUM LACTATE AND CALCIUM CHLORIDE: 600; 310; 30; 20 INJECTION, SOLUTION INTRAVENOUS at 05:42

## 2023-11-21 RX ADMIN — Medication 10 ML: at 20:29

## 2023-11-21 RX ADMIN — AMIODARONE HYDROCHLORIDE 200 MG: 200 TABLET ORAL at 08:41

## 2023-11-21 RX ADMIN — AMIODARONE HYDROCHLORIDE 200 MG: 200 TABLET ORAL at 20:29

## 2023-11-21 RX ADMIN — POTASSIUM CHLORIDE 40 MEQ: 1500 TABLET, EXTENDED RELEASE ORAL at 08:41

## 2023-11-21 RX ADMIN — DIVALPROEX SODIUM 125 MG: 125 TABLET, DELAYED RELEASE ORAL at 20:29

## 2023-11-21 RX ADMIN — Medication 10 ML: at 22:02

## 2023-11-21 RX ADMIN — ENOXAPARIN SODIUM 60 MG: 100 INJECTION SUBCUTANEOUS at 08:41

## 2023-11-21 RX ADMIN — METOPROLOL TARTRATE 50 MG: 50 TABLET ORAL at 20:29

## 2023-11-21 RX ADMIN — CEFTRIAXONE SODIUM 1000 MG: 1 INJECTION, POWDER, FOR SOLUTION INTRAMUSCULAR; INTRAVENOUS at 18:42

## 2023-11-21 RX ADMIN — DILTIAZEM HYDROCHLORIDE 12.5 MG/HR: 5 INJECTION, SOLUTION INTRAVENOUS at 09:21

## 2023-11-21 RX ADMIN — Medication 400 MG: at 08:41

## 2023-11-21 ASSESSMENT — PAIN SCALES - PAIN ASSESSMENT IN ADVANCED DEMENTIA (PAINAD)
CONSOLABILITY: 0
CONSOLABILITY: 0
TOTALSCORE: 0
FACIALEXPRESSION: 0
BODYLANGUAGE: 0
CONSOLABILITY: 0
TOTALSCORE: 0
BODYLANGUAGE: 0
BREATHING: 0
NEGVOCALIZATION: 0
BODYLANGUAGE: 0
NEGVOCALIZATION: 0
FACIALEXPRESSION: 0
BREATHING: 0
TOTALSCORE: 0
TOTALSCORE: 0
FACIALEXPRESSION: 0
CONSOLABILITY: 0
TOTALSCORE: 0
BODYLANGUAGE: 0
NEGVOCALIZATION: 0
NEGVOCALIZATION: 0
FACIALEXPRESSION: 0
NEGVOCALIZATION: 0
BREATHING: 0
BREATHING: 0
FACIALEXPRESSION: 0
TOTALSCORE: 0
BREATHING: 0
FACIALEXPRESSION: 0
CONSOLABILITY: 0
BODYLANGUAGE: 0
CONSOLABILITY: 0
BODYLANGUAGE: 0
BREATHING: 0
NEGVOCALIZATION: 0

## 2023-11-21 ASSESSMENT — ENCOUNTER SYMPTOMS
SHORTNESS OF BREATH: 0
CHEST TIGHTNESS: 0
NAUSEA: 0
BLOOD IN STOOL: 0
EYES NEGATIVE: 1
STRIDOR: 0
COUGH: 0
WHEEZING: 0
RESPIRATORY NEGATIVE: 1
GASTROINTESTINAL NEGATIVE: 1

## 2023-11-21 NOTE — PROGRESS NOTES
1928: Pt brought to 192. VS stable on admission. Pt alert, oriented to self, crying, combative with staff, and upset. Pt redirectable at this time. 2000: Completed admission paperwork and med rec with daughter Diamond Pendleton via phone at [phone]. Dr. Navneet Greenwood aware med rec complete. Pharmacy messaged for cardizem gtt. 2015: Pt combative, unable to get second IV access, three RN attempts. Will reassess when pt calms down. 2021:/93, hr 140 in afib. Cardizem gtt starts at 5 mg/hr as ordered. 2028: AVASYS monitoring in place. Pt combative, pulling at lines, trying to get out of bed, and agitated. 2103: /86 hr in 120-130s. Rate increased to 7.5 mg/hr. 2145: /74 hr in 90-100s, below goal of 120. Continuing gtt at current rate as ordered. 2220: PT hr in 's, continuing gtt at current dose ordered. 2224: Per pharmacy okay to Y Cardizem and LR.    2300: /100, HR in low 100s. Continue gtt at current rate. 0000: HR 's, continue gtt at current rate as ordered. 0200: /59, hr in 100's. Continue gtt at current rate. 0400: /61, hr in 100's. Continue gtt at current rate as ordered. 0600: /67, hr in 130s-140s. Cardizem gtt increased to 10mg/hr. 0630: /87 hr in 130s. Cradizem gtt increased to 12.5mg/hr as ordered. Dr. Jignesh Chavez made aware. Per Dr. Jignesh Chavez continue to titrate up on Cardizem gtt. Awaiting further orders at this time. 3514: /104, hr in 130-140s. Cradizem gtt increased to 15mg/hr. Dr. Jignesh Chavez at bedside assessing pt. Awaiting further orders at this time.

## 2023-11-21 NOTE — CONSULTS
Inpatient consult to Cardiology  Consult performed by: Regis Marcelino MD  Consult ordered by: Jose Juan Beckham MD          Patient Name: Marixa Saeed Date: 2023  1:55 PM  MR #: [id number]  : 1935    Attending Physician: Jose Juan Beckham MD  Reason for consult: AF    History of Presenting Illness:      Marybel Bardales is a 82 y.o. female on hospital day 1 with a history of . History Obtained From:   electronic medical record    Pt is sent from NH due to agitation worse confusion. She does have underlying Dementia. We are asked to evaluate for Rapid AF rates have been 90 to 170 overnight. Already on Cardizem gtt. She has been on Eli[facility] at the [facility]. She has long h/o AF.   2023 Echo EF 60-65%    She appears comfortable currently and not agitated, she is confused.     Records reflect that she is DNR- [facility]  History:      EKG:Rapid AF  Past Medical History:   Diagnosis Date    Anxiety     Arthritis     Chronic back pain     DEGENERATIVE BACK    Depression     Distal radial fracture     GERD (gastroesophageal reflux disease)     Glaucoma     History of mammography, screening  CAT 2 BILAT    History of osteopenia     Hypertension     meds > 8 yrs / denies TIA or stroke    Lumbar radicular pain     Polycythemia vera(238.4)     Rib fracture 2018    Senile osteoporosis     Unspecified dementia, severe, without behavioral disturbance, psychotic disturbance, mood disturbance, and anxiety (HCC)     Urinary tract infection without hematuria 2022     Past Surgical History:   Procedure Laterality Date    APPENDECTOMY      BACK SURGERY      COLONOSCOPY      ENDOSCOPY, COLON, DIAGNOSTIC      EYE SURGERY      OS eye glaucoma OR    FRACTURE SURGERY Left 2012    distal radial fracture/Dr. Varun Bo    HYSTERECTOMY (CERVIX STATUS UNKNOWN)      [place] PERQ VERTEBROPLASTY UNI/BI INJX CERVICOTHORACIC N/A 3/7/2018    T-11, T-12 VERTEBRAL AUGMENTATION performed by Nuha Felix MD at [address] [place] [address]

## 2023-11-21 NOTE — CARE COORDINATION
Discharge plan remains [facility] when medically cleared. Patient is a long term resident and will not need a pre cert to return.

## 2023-11-21 NOTE — PROGRESS NOTES
[facility]   [facility]  Speech Language Pathology    NAME:Carlin Engel  : 1935   ROOM: [id number]      Date: 2023      Chart reviewed, patient has not had a change in medical status at this time. Patient is currently on program with SLP. New evaluation not indicated at this time. Spoke with Manuela Angulo.         Electronically signed by SHAGUFTA Lara on 2023 at 1:10 PM

## 2023-11-21 NOTE — PROGRESS NOTES
[facility] OCCUPATIONAL THERAPY EVALUATION - ACUTE     NAME: Boone Navarrete  : 1935 (82 y.o.)  MRN: [id number]  CODE STATUS: DNR-CCA  Room: I267/J768-70    Date of Service: 2023    Patient Diagnosis(es): Disorientation [R41.0]  Atrial fibrillation with RVR ([address]) [I48.91]  Urinary tract infection without hematuria, site unspecified [N39.0]   Patient Active Problem List    Diagnosis Date Noted    Right foot infection 2022    Urinary tract infection without hematuria 2022    Rhabdomyolysis 2023    COVID-19 virus infection 2023    Uterine procidentia 2023    Dementia with behavioral disturbance (HCC) 04/10/2023    MCI (mild cognitive impairment) 04/10/2023    Frontal gait 04/10/2023    Coffee ground emesis 2023    AMS (altered mental status) 2023    Senile osteoporosis     Multiple closed fractures of ribs of left side 2018    Hypercalcemia 2018    Chronic pain of both knees 2018    Height loss 2018    Joint stiffness of multiple sites 2018    Secondary osteoarthritis of multiple sites 2018    Opioid dependence, daily use ([address]) 2018    Wedge compression fracture of T11-T12 vertebra, initial encounter for closed fracture ([address]) 12/15/2017    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 degeneration of both eyes 2015    Atrial fibrillation with RVR ([address]) 2015    Spinal stenosis of lumbar region with radiculopathy 2015    Gait disturbance 2012    Lumbar radicular pain 2012    Depression with anxiety     Hypertension     GERD (gastroesophageal reflux disease)     Elevated cholesterol         Past Medical History:   Diagnosis Date    Anxiety     Arthritis     Chronic back pain     DEGENERATIVE BACK    Depression     Distal radial fracture     GERD (gastroesophageal reflux disease)     Glaucoma     History of mammography, screening  CAT 2 BILAT    History of osteopenia Pt. requires physical assistance (25%, 50%, 75% assist from helper) for task but is able to actively participate in task   Dependent = Pt. requires total assistance with task and is not able to actively participate with task completion     Plan:  Occupational Therapy Plan  Times Per Week: 1-4x/wk  Current Treatment Recommendations: Strengthening, ROM, Balance training, Functional mobility training, Endurance training, Neuromuscular re-education, Self-Care / ADL, Cognitive/Perceptual training, Cognitive reorientation    Goals:   Patient will:    - Improve functional endurance to tolerate/complete 10-15 mins of ADL's  - Be Mod A in UB ADLs   - Be Mod A in LB ADLs  - Be Mod A in ADL transfers without LOB  - Be Min A in toileting tasks  - Improve bilateral UE strength and endurance to Fair in order to participate in self-care activities as projected.   - Sequence self-care tasks with minimal verbal cues    Patient Goal: Patient goals : Not stated at this time      Discussed and agreed upon: Yes Comments:       Therapy Time:   Individual   Time In 0823   Time Out 0840   Minutes 17          Eval: 17 minutes     Electronically signed by:    UGO Campo,   18/81/8843, 10:05 AM Electronically signed by UGO Campo on 30/70/62 at 10:08 AM EST

## 2023-11-21 NOTE — PROGRESS NOTES
[facility]   [facility] Language Pathology  Clinical Bedside Swallow Evaluation    NAME:Carlin Engel  : 1935 (82 y.o.)   [x]   confirmed    MRN: [id number]  ROOM: [place] [address]  ADMISSION DATE: 2023  PATIENT DIAGNOSIS(ES): Disorientation [R41.0]  Atrial fibrillation with RVR ([address]) [I48.91]  Urinary tract infection without hematuria, site unspecified [N39.0]  Chief Complaint   Patient presents with    Fall     Saturday unwitnessed fall    Altered Mental Status     Since Friday.  Given Haldol and ativan on Friday and has been sleepy     Patient Active Problem List    Diagnosis Date Noted    Right foot infection 2022    Urinary tract infection without hematuria 2022    Rhabdomyolysis 2023    COVID-19 virus infection 2023    Uterine procidentia 2023    Dementia with behavioral disturbance (HCC) 04/10/2023    MCI (mild cognitive impairment) 04/10/2023    Frontal gait 04/10/2023    Coffee ground emesis 2023    AMS (altered mental status) 2023    Senile osteoporosis     Multiple closed fractures of ribs of left side 2018    Hypercalcemia 2018    Chronic pain of both knees 2018    Height loss 2018    Joint stiffness of multiple sites 2018    Secondary osteoarthritis of multiple sites 2018    Opioid dependence, daily use ([address]) 2018    Wedge compression fracture of T11-T12 vertebra, initial encounter for closed fracture ([address]) 12/15/2017    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 degeneration of both eyes 2015    Atrial fibrillation with RVR ([address]) 2015    Spinal stenosis of lumbar region with radiculopathy 2015    Gait disturbance 2012    Lumbar radicular pain 2012    Depression with anxiety     Hypertension     GERD (gastroesophageal reflux disease)     Elevated cholesterol      Past Medical History:   Diagnosis Date    Anxiety     Arthritis

## 2023-11-21 NOTE — PROGRESS NOTES
Physical Therapy Missed Treatment   Facility/Department: [facility] MED SURG R414/Z633-10    NAME: Betty Sosa    : 1935 (82 y.o.)  MRN: [id number]    Account: [de-identified]  Gender: female      Hold PT evaluation today per nurse. Await neurology consult per nursing. Will follow and attempt PT evaluation again at earliest availability.        Brenda Hernandez, PT, 23 at 1:57 PM

## 2023-11-21 NOTE — PROGRESS NOTES
1200- speech recommendations to keep npo for high risk of aspiration. 1230- ng tube was ordered for po medications to be given through NG, poa/daughter declined at this time. 1500- Pt agitated hitting staff, RN attempted to offer interventions such as repositioning and re-direction and was unsuccessful. Notified Dr. Russel Voss    [id number]- Pt refusing treatment yelling and hitting RN's and CNA won't allow re-positioning and or allow another iv placed for antibiotics.  Notified Dr. Russel Voss

## 2023-11-21 NOTE — DISCHARGE INSTR - COC
Continuity of Care Form    Patient Name: Federico Singh   :  1935  MRN:  [id number]    Admit date:  2023  Discharge date:        Code Status Order: DNR-CCA   Advance Directives:     Admitting Physician:  Nikki Rondon MD  PCP: Avni Sexton MD    Discharging Nurse: Mayra Armstrong Unit/Room#: L760/B222-42  Discharging Unit Phone Number: [phone]    Emergency Contact:   Extended Emergency Contact Information  Primary Emergency Contact: [address] Phone: [phone]  Mobile Phone: [phone]  Relation: Child  Preferred language: English   needed?  No  Secondary Emergency Contact: Iraandanika [facility] [address] Phone: [phone]  Work Phone: [phone]  Mobile Phone: [phone]  Relation: Child    Past Surgical History:  Past Surgical History:   Procedure Laterality Date    APPENDECTOMY      BACK SURGERY      COLONOSCOPY      ENDOSCOPY, COLON, DIAGNOSTIC      EYE SURGERY      OS eye glaucoma OR    FRACTURE SURGERY Left 2012    distal radial fracture/Dr. Marilu Vasquez    HYSTERECTOMY (CERVIX STATUS UNKNOWN)      AL PERQ VERTEBROPLASTY UNI/BI INJX CERVICOTHORACIC N/A 3/7/2018    T-11, T-12 VERTEBRAL AUGMENTATION performed by Jeffry Bautista MD at [address] [place] [address]       Immunization History:   Immunization History   Administered Date(s) Administered    COVID-19, [address], Primary or Immunocompromised, (age 12y+), IM, 100 mcg/0.5mL 2021, 2021, 2021    COVID-19, MODERNA Bivalent, (age 12y+), IM, 48 mcg/0.5 mL 2022    Influenza A (H2H3-25) Vaccine PF IM 2010    Influenza Vaccine, unspecified formulation 10/28/2011, 10/15/2012, 2013, 2014, 10/12/2015, 2016    Influenza Virus Vaccine 10/28/2011, 10/15/2012, 2013, 2014, 10/12/2015, 2016    Influenza, FLUAD, (age 72 y+), Adjuvanted, 0.5mL 2021, 11/10/2022    Influenza, FLUZONE (age 72 y+), High Dose, 0.7mL 2020    Influenza, none  Oxygen Therapy:  is not on home oxygen therapy. Ventilator:    - No ventilator support    Rehab Therapies: Physical Therapy and Occupational Therapy speech  Weight Bearing Status/Restrictions: No weight bearing restrictions  Other Medical Equipment (for information only, NOT a DME order):  wheelchair, bedside commode, and hospital bed  Other Treatments: none    Patient's personal belongings (please select all that are sent with patient):  None    RN SIGNATURE:  Electronically signed by Jonathon Walter RN on 11/24/23 at 6:01 PM EST    CASE MANAGEMENT/SOCIAL WORK SECTION    Inpatient Status Date: 11/20/23    Readmission Risk Assessment Score:  Readmission Risk              Risk of Unplanned Readmission:  21           Discharging to Facility/ Agency   Name: [facility]  Address: [address] [place] [address]  Phone: [phone]  Fax:    Dialysis Facility (if applicable)   Name:  Address:  Dialysis Schedule:  Phone:  Fax:    / signature: {Esignature:[id number]}    PHYSICIAN SECTION    Prognosis: Guarded    Condition at Discharge: Stable    Rehab Potential (if transferring to Rehab):     Recommended Labs or Other Treatments After Discharge: palliative care consult; encourage PO intake; seroquel only at night; 3 more days of PO abx    Physician Certification: I certify the above information and transfer of Carlin Green  is necessary for the continuing treatment of the diagnosis listed and that she requires [address] for less 30 days.      Update Admission H&P: No change in H&P    PHYSICIAN SIGNATURE:  Electronically signed by Rylan aHys MD on 11/24/23 at 4:59 PM EST

## 2023-11-22 PROBLEM — F03.C0 SEVERE DEMENTIA (HCC): Status: ACTIVE | Noted: 2023-11-22

## 2023-11-22 LAB
AMMONIA PLAS-SCNC: 64 UMOL/L (ref 11–51)
ANION GAP SERPL CALCULATED.3IONS-SCNC: 12 MEQ/L (ref 9–15)
BACTERIA UR CULT: NORMAL
BASOPHILS # BLD: 0 K/UL (ref 0–0.2)
BASOPHILS NFR BLD: 0.4 %
BUN SERPL-MCNC: 14 MG/DL (ref 8–23)
CALCIUM SERPL-MCNC: 8.5 MG/DL (ref 8.5–9.9)
CHLORIDE SERPL-SCNC: 102 MEQ/L (ref 95–107)
CO2 SERPL-SCNC: 25 MEQ/L (ref 20–31)
CREAT SERPL-MCNC: 0.41 MG/DL (ref 0.5–0.9)
EOSINOPHIL # BLD: 0 K/UL (ref 0–0.7)
EOSINOPHIL NFR BLD: 0.2 %
ERYTHROCYTE [DISTWIDTH] IN BLOOD BY AUTOMATED COUNT: 13.8 % (ref 11.5–14.5)
FOLATE: 15.4 NG/ML
GLUCOSE SERPL-MCNC: 86 MG/DL (ref 70–99)
HCT VFR BLD AUTO: 44.8 % (ref 37–47)
HGB BLD-MCNC: 15.3 G/DL (ref 12–16)
LYMPHOCYTES # BLD: 1.7 K/UL (ref 1–4.8)
LYMPHOCYTES NFR BLD: 14 %
MAGNESIUM SERPL-MCNC: 1.8 MG/DL (ref 1.7–2.4)
MCH RBC QN AUTO: 32.1 PG (ref 27–31.3)
MCHC RBC AUTO-ENTMCNC: 34.2 % (ref 33–37)
MCV RBC AUTO: 93.9 FL (ref 79.4–94.8)
METAMYELOCYTES NFR BLD MANUAL: 1 %
MONOCYTES # BLD: 2.4 K/UL (ref 0.2–0.8)
MONOCYTES NFR BLD: 20 %
NEUTROPHILS # BLD: 7.8 K/UL (ref 1.4–6.5)
NEUTS SEG NFR BLD: 65 %
NRBC BLD-RTO: 1 /100 WBC
PERFORMED ON: NORMAL
PLATELET # BLD AUTO: 203 K/UL (ref 130–400)
PLATELET BLD QL SMEAR: ADEQUATE
POC CREATININE: 0.6 MG/DL (ref 0.6–1.2)
POC SAMPLE TYPE: NORMAL
POTASSIUM SERPL-SCNC: 3 MEQ/L (ref 3.4–4.9)
RBC # BLD AUTO: 4.77 M/UL (ref 4.2–5.4)
RBC MORPH BLD: NORMAL
SODIUM SERPL-SCNC: 139 MEQ/L (ref 135–144)
TSH REFLEX: 3.67 UIU/ML (ref 0.44–3.86)
VALPROATE SERPL-MCNC: 28.7 UG/ML (ref 50–100)
VITAMIN B-12: 1079 PG/ML (ref 232–1245)
WBC # BLD AUTO: 11.8 K/UL (ref 4.8–10.8)

## 2023-11-22 PROCEDURE — 6370000000 HC RX 637 (ALT 250 FOR IP): Performed by: INTERNAL MEDICINE

## 2023-11-22 PROCEDURE — 82746 ASSAY OF FOLIC ACID SERUM: CPT

## 2023-11-22 PROCEDURE — 99222 1ST HOSP IP/OBS MODERATE 55: CPT | Performed by: PSYCHIATRY & NEUROLOGY

## 2023-11-22 PROCEDURE — 85025 COMPLETE CBC W/AUTO DIFF WBC: CPT

## 2023-11-22 PROCEDURE — APPSS45 APP SPLIT SHARED TIME 31-45 MINUTES: Performed by: NURSE PRACTITIONER

## 2023-11-22 PROCEDURE — 82140 ASSAY OF AMMONIA: CPT

## 2023-11-22 PROCEDURE — 84443 ASSAY THYROID STIM HORMONE: CPT

## 2023-11-22 PROCEDURE — 2580000003 HC RX 258: Performed by: INTERNAL MEDICINE

## 2023-11-22 PROCEDURE — 6360000002 HC RX W HCPCS: Performed by: INTERNAL MEDICINE

## 2023-11-22 PROCEDURE — 1210000000 HC MED SURG R&B

## 2023-11-22 PROCEDURE — 83735 ASSAY OF MAGNESIUM: CPT

## 2023-11-22 PROCEDURE — 99233 SBSQ HOSP IP/OBS HIGH 50: CPT | Performed by: INTERNAL MEDICINE

## 2023-11-22 PROCEDURE — 82607 VITAMIN B-12: CPT

## 2023-11-22 PROCEDURE — 36415 COLL VENOUS BLD VENIPUNCTURE: CPT

## 2023-11-22 PROCEDURE — 80164 ASSAY DIPROPYLACETIC ACD TOT: CPT

## 2023-11-22 PROCEDURE — 80048 BASIC METABOLIC PNL TOTAL CA: CPT

## 2023-11-22 RX ORDER — QUETIAPINE FUMARATE 25 MG/1
12.5 TABLET, FILM COATED ORAL 2 TIMES DAILY
Status: DISCONTINUED | OUTPATIENT
Start: 2023-11-22 | End: 2023-11-23

## 2023-11-22 RX ORDER — POTASSIUM CHLORIDE 20 MEQ/1
40 TABLET, EXTENDED RELEASE ORAL ONCE
Status: COMPLETED | OUTPATIENT
Start: 2023-11-22 | End: 2023-11-22

## 2023-11-22 RX ORDER — BUSPIRONE HYDROCHLORIDE 5 MG/1
5 TABLET ORAL NIGHTLY
Status: DISCONTINUED | OUTPATIENT
Start: 2023-11-22 | End: 2023-11-25 | Stop reason: HOSPADM

## 2023-11-22 RX ORDER — AMIODARONE HYDROCHLORIDE 200 MG/1
200 TABLET ORAL 2 TIMES DAILY
Status: DISCONTINUED | OUTPATIENT
Start: 2023-11-22 | End: 2023-11-25 | Stop reason: HOSPADM

## 2023-11-22 RX ADMIN — DIVALPROEX SODIUM 125 MG: 125 TABLET, DELAYED RELEASE ORAL at 20:36

## 2023-11-22 RX ADMIN — POTASSIUM CHLORIDE 40 MEQ: 1500 TABLET, EXTENDED RELEASE ORAL at 08:10

## 2023-11-22 RX ADMIN — QUETIAPINE FUMARATE 12.5 MG: 200 TABLET ORAL at 11:57

## 2023-11-22 RX ADMIN — BUSPIRONE HYDROCHLORIDE 5 MG: 5 TABLET ORAL at 20:36

## 2023-11-22 RX ADMIN — AMIODARONE HYDROCHLORIDE 200 MG: 200 TABLET ORAL at 20:36

## 2023-11-22 RX ADMIN — METOPROLOL TARTRATE 50 MG: 50 TABLET ORAL at 08:10

## 2023-11-22 RX ADMIN — DIVALPROEX SODIUM 125 MG: 125 TABLET, DELAYED RELEASE ORAL at 08:10

## 2023-11-22 RX ADMIN — QUETIAPINE FUMARATE 12.5 MG: 200 TABLET ORAL at 20:38

## 2023-11-22 RX ADMIN — SODIUM CHLORIDE, POTASSIUM CHLORIDE, SODIUM LACTATE AND CALCIUM CHLORIDE: 600; 310; 30; 20 INJECTION, SOLUTION INTRAVENOUS at 06:44

## 2023-11-22 RX ADMIN — Medication 400 MG: at 08:10

## 2023-11-22 RX ADMIN — CEFTRIAXONE SODIUM 1000 MG: 1 INJECTION, POWDER, FOR SOLUTION INTRAMUSCULAR; INTRAVENOUS at 15:43

## 2023-11-22 ASSESSMENT — ENCOUNTER SYMPTOMS
SHORTNESS OF BREATH: 0
TROUBLE SWALLOWING: 0
CHEST TIGHTNESS: 0
WHEEZING: 0
STRIDOR: 0
BLOOD IN STOOL: 0
VOMITING: 0
EYES NEGATIVE: 1
COLOR CHANGE: 0
GASTROINTESTINAL NEGATIVE: 1
RESPIRATORY NEGATIVE: 1
COUGH: 0
NAUSEA: 0

## 2023-11-22 NOTE — CONSULTS
[facility] Neurology Consult Note  Name: Victorino Jones  Age: 82 y.o. Gender: female  CodeStatus: DNR-CCA  Allergies: Hydromorphone  Dilaudid [Fd&C Blue #1 Al Cornejo-Hydromorphone]  Hydralazine    Chief Complaint:Fall (Saturday unwitnessed fall) and Altered Mental Status (Since Friday. Given Haldol and ativan on Friday and has been sleepy)    Primary Care Provider: Long Lopez MD  InpatientTreatment Team: Treatment Team: Attending Provider: Quita Beasley MD; Consulting Physician: Araceli Pineda MD; Tech: Carina Rubio; Consulting Physician: Aftab Murillo MD; Consulting Physician: Pancho Patrick MD; Registered Nurse: Marsha Riddle, ЮЛИЯ; : Catracho Zuleta, ЮЛИЯ; Registered Nurse: Jessi Ferrer RN; Utilization Reviewer: Libertad Samayoa RN; Patient Care Tech: [facility]  Admission Date: 11/20/2023      HPI   Consulting provider: Dr uQita Beasley for encephalopathy  Pt seen and examined for neurology consult. Patient is a 81-year-old female with past medical history of dementia, polycythemia vera, hypertension, GERD, depression, anxiety, peripheral neuropathy, chronic lower back pain status post lumbar fusion in 2015, fibromyalgia atrial fibrillation on Eliquis who [facility] [place] emergency room on 11/20/2023 from nursing home due to altered mental status. Patient had ongoing cognitive changes for several days. Was also seen in the emergency room on 11/17/2023 for the same complaint. Daughter reported that mental status has not improved. Daughter reported unwitnessed fall that occurred while at the nursing home on 11/18/2023. Per documentation from previous neurologist and us as well patient does have history of underlying cognitive impairment/dementia with previously known behavioral disturbances. Was seen by us while inpatient in April 2023 for the same and started on Seroquel at that time.     EKG showed atrial fibrillation with rapid ventricular response at 133 bpm. reviewed all data and investigations, and am the sole provider of all clinical decisions on the neurological status of this patient. Dementia with confusion with a decline. This is an exacerbation of her previous cognitive findings. Initially there appeared to be some session of neck pain though neck pain is improved and lumbar puncture for now is not indicated as she has no meningeal signs or fevers. 60% time spent on evaluating patient      Erik Rothman MD, Lg Spencer, American Board of Psychiatry & Neurology  Board Certified in Vascular Neurology  Board Certified in Neuromuscular Medicine  Certified in Neurorehabilitation           Collaborating physicians: Dr Ced Rothman    Electronically signed by JULISA Lebron CNP on 11/22/2023 at 9:44 AM

## 2023-11-22 NOTE — PROGRESS NOTES
PROGRESS NOTE      Patient Name: Oscar Cantrell  Admit Date: 2023  1:55 PM  MR #: [id number]  : 1935    Attending Physician: Irean Mcardle, MD  Reason for consult: AF    History of Presenting Illness:      Oscar Cantrell is a 82 y.o. female on hospital day 2 with a history of . History Obtained From:   electronic medical record    Pt is sent from NH due to agitation worse confusion. She does have underlying Dementia. We are asked to evaluate for Rapid AF rates have been 90 to 170 overnight. Already on Cardizem gtt. She has been on Eliquis at the [facility]. She has long h/o AF.   2023 Echo EF 60-65%    She appears comfortable currently and not agitated, she is confused. Records reflect that she is DNR- CCA    23 Telemetry SR 72 converted overnight. Frequent agitation. She hit few staff yesterday. She is confused today.    History:      EKG:Rapid AF  Past Medical History:   Diagnosis Date    Anxiety     Arthritis     Chronic back pain     DEGENERATIVE BACK    Depression     Distal radial fracture     GERD (gastroesophageal reflux disease)     Glaucoma     History of mammography, screening  CAT 2 BILAT    History of osteopenia     Hypertension     meds > 8 yrs / denies TIA or stroke    Lumbar radicular pain     Polycythemia vera(238.4)     Rib fracture 2018    Senile osteoporosis     Unspecified dementia, severe, without behavioral disturbance, psychotic disturbance, mood disturbance, and anxiety (HCC)     Urinary tract infection without hematuria 2022     Past Surgical History:   Procedure Laterality Date    APPENDECTOMY      BACK SURGERY      COLONOSCOPY      ENDOSCOPY, COLON, DIAGNOSTIC      EYE SURGERY      OS eye glaucoma OR    FRACTURE SURGERY Left 2012    distal radial fracture/Dr. [place]    HYSTERECTOMY (CERVIX STATUS UNKNOWN)      SD PERQ VERTEBROPLASTY UNI/BI INJX CERVICOTHORACIC N/A 3/7/2018    T-11, T-12 VERTEBRAL AUGMENTATION performed by Rosario Melissa due to concerns of  Meningitis, Lovenox has been held for LP today. Please resume ASAP. Normal LVEF  HTN - Maximize Meds. Advanced Dementia with confusion and agitation. HypoK - repalce now  HypoMag - replace. Conservative med rx. No plans for invasive CV testings. Thank you for allowing us to participate in the care of this patient. Will continue to follow. Please call if questions or concerns arise.     Electronically signed by Lona Berger MD on 11/22/2023 at 9:06 AM

## 2023-11-22 NOTE — PROGRESS NOTES
Physical Therapy Missed Treatment   Facility/Department: [facility] MED SURG Y900/I921-66    NAME: Boone Navarrete    : 1935 (82 y.o.)  MRN: [id number]    Account: [de-identified]  Gender: female      Per PT note  at 1400, therapy to hold until neuro consult. Per chart review, patient has not had neuro consult and is awaiting lumbar puncture. Will follow and attempt PT evaluation again at earliest availability.        Augusta Momin, PT, 23 at 9:46 AM

## 2023-11-22 NOTE — PROGRESS NOTES
Physical Therapy Missed Treatment   Facility/Department: [facility] MED SURG X079/C531-42    NAME: Matthias Barahona    : 1935 (82 y.o.)  MRN: [id number]    Account: [de-identified]  Gender: female      Attempted PT eval. Pt declined due to fatigue. Chart review indicates neuro has seen pt and LP is not going to be completed. Pt has been intermittently agitated prior to this trial. Pt a [facility] at [facility]. Nursing staff notified. Will follow and attempt PT evaluation again at earliest availability.        Duke Rice, PT, 23 at 1:56 PM

## 2023-11-22 NOTE — PROGRESS NOTES
Rn updated poa/daughter of plan of care. All questions answered. 18- Notified Dr. Calvin Wallace lumbar puncture is Dc'd and can resume anticoagulant. New orders received for weight based Lovenox BID.

## 2023-11-22 NOTE — PROGRESS NOTES
[facility] [place]   Facility/Department: [facility] TELEMETRY  Speech Language Pathology    Marybel Bardales  1935  [id number]    Date: 11/22/2023      Speech Therapy attempted to see Marybel Bardales on this date for a/an:    Treatment    Pt was unable to be seen due to:   Patient refused - Pt laying curled on side upon SLP arrival. Pt adamantly refused repositioning for PO trials following education, encouragement, and re-phrasing. RN Ammy Agruello notified. RN reports pt tolerated sips of thin liquids this morning with no s/s of aspiration. Will re-attempt at next opportunity.          Electronically signed by SHAGUFTA Wei on 11/22/23 at 10:03 AM EST

## 2023-11-22 NOTE — FLOWSHEET NOTE
11/22/23 0703   Treatment Team Notification   Reason for Communication Critical results   Type of Critical Result Laboratory   Critical Lab Information K+ 3.02   Person Result Received From Lab   Critical Lab Result Type Other (comment)  (Potassium)   Name of Team Member Notified Gerald Weston   Treatment Team Role Advanced Practice Nurse   Method of Communication Secure Message   Response Waiting for response   Notification Time 3428

## 2023-11-22 NOTE — CARE COORDINATION
PT FOR LP TODAY. DC PLAN REMAINS [facility] ONCE CLEARED BY JOSEE SPARKS, Shital Cas[address] Dr.

## 2023-11-22 NOTE — PROGRESS NOTES
Was able to hook up half of Pts head for EEG. Pt became uncooperative. Yelled at me to get out that I cant tell her what to do. Was unable to complete the test at this time.

## 2023-11-22 NOTE — CARE COORDINATION
Discharge plan remains unchanged. Patient is a long term resident at [facility] and will not need a pre cert to return. Plan is for a LP today. Naty Yo ([address] [facility] liaison) updated.

## 2023-11-22 NOTE — PROGRESS NOTES
[facility] EMERGENCY [facility] [place]   Facility/Department: [facility] TELEMETRY  Speech Language Pathology    Warden Hollis  1935  [id number]    Date: 11/22/2023      Speech Therapy attempted to see Warden Hollis on this date for a/an:    Treatment    Pt was unable to be seen due to:   Patient refused Pt seen after EEG att. Pt laying on side with eyes closed. Pt would not open eyes to speak with SLP. Pt declined all offered PO. Pt stated \"no\" to any communication att. Unable to assess swallow function. Will re-attempt at next opportunity and as pt is agreeable to participate.          Electronically signed by SHAGUFTA Teixeira on 11/22/23 at 1:53 PM EST

## 2023-11-23 LAB
ANION GAP SERPL CALCULATED.3IONS-SCNC: 11 MEQ/L (ref 9–15)
BASOPHILS # BLD: 0 K/UL (ref 0–0.2)
BASOPHILS NFR BLD: 0.6 %
BUN SERPL-MCNC: 17 MG/DL (ref 8–23)
BURR CELLS: ABNORMAL
CALCIUM SERPL-MCNC: 8.4 MG/DL (ref 8.5–9.9)
CHLORIDE SERPL-SCNC: 97 MEQ/L (ref 95–107)
CO2 SERPL-SCNC: 24 MEQ/L (ref 20–31)
CREAT SERPL-MCNC: 0.57 MG/DL (ref 0.5–0.9)
DACRYOCYTES BLD QL SMEAR: ABNORMAL
EOSINOPHIL # BLD: 0.1 K/UL (ref 0–0.7)
EOSINOPHIL NFR BLD: 1 %
ERYTHROCYTE [DISTWIDTH] IN BLOOD BY AUTOMATED COUNT: 13.7 % (ref 11.5–14.5)
GLUCOSE SERPL-MCNC: 155 MG/DL (ref 70–99)
HCT VFR BLD AUTO: 46.9 % (ref 37–47)
HGB BLD-MCNC: 16.2 G/DL (ref 12–16)
LYMPHOCYTES # BLD: 1.6 K/UL (ref 1–4.8)
LYMPHOCYTES NFR BLD: 9 %
MCH RBC QN AUTO: 32 PG (ref 27–31.3)
MCHC RBC AUTO-ENTMCNC: 34.5 % (ref 33–37)
MCV RBC AUTO: 92.7 FL (ref 79.4–94.8)
MONOCYTES # BLD: 1.1 K/UL (ref 0.2–0.8)
MONOCYTES NFR BLD: 8.7 %
NEUTROPHILS # BLD: 9.8 K/UL (ref 1.4–6.5)
NEUTS SEG NFR BLD: 78 %
PLATELET # BLD AUTO: 228 K/UL (ref 130–400)
PLATELET BLD QL SMEAR: ADEQUATE
POIKILOCYTOSIS BLD QL SMEAR: ABNORMAL
POTASSIUM SERPL-SCNC: 3.7 MEQ/L (ref 3.4–4.9)
RBC # BLD AUTO: 5.06 M/UL (ref 4.2–5.4)
SODIUM SERPL-SCNC: 132 MEQ/L (ref 135–144)
VARIANT LYMPHS NFR BLD: 4 %
WBC # BLD AUTO: 12.5 K/UL (ref 4.8–10.8)

## 2023-11-23 PROCEDURE — 99232 SBSQ HOSP IP/OBS MODERATE 35: CPT | Performed by: PSYCHIATRY & NEUROLOGY

## 2023-11-23 PROCEDURE — 99233 SBSQ HOSP IP/OBS HIGH 50: CPT | Performed by: INTERNAL MEDICINE

## 2023-11-23 PROCEDURE — 6360000002 HC RX W HCPCS: Performed by: INTERNAL MEDICINE

## 2023-11-23 PROCEDURE — 2580000003 HC RX 258: Performed by: INTERNAL MEDICINE

## 2023-11-23 PROCEDURE — 80048 BASIC METABOLIC PNL TOTAL CA: CPT

## 2023-11-23 PROCEDURE — 1210000000 HC MED SURG R&B

## 2023-11-23 PROCEDURE — 6370000000 HC RX 637 (ALT 250 FOR IP): Performed by: INTERNAL MEDICINE

## 2023-11-23 PROCEDURE — 2580000003 HC RX 258: Performed by: PSYCHIATRY & NEUROLOGY

## 2023-11-23 PROCEDURE — 92526 ORAL FUNCTION THERAPY: CPT

## 2023-11-23 PROCEDURE — 85025 COMPLETE CBC W/AUTO DIFF WBC: CPT

## 2023-11-23 PROCEDURE — 36415 COLL VENOUS BLD VENIPUNCTURE: CPT

## 2023-11-23 RX ORDER — QUETIAPINE FUMARATE 25 MG/1
12.5 TABLET, FILM COATED ORAL NIGHTLY
Status: DISCONTINUED | OUTPATIENT
Start: 2023-11-24 | End: 2023-11-23

## 2023-11-23 RX ORDER — METOPROLOL TARTRATE 1 MG/ML
5 INJECTION, SOLUTION INTRAVENOUS EVERY 6 HOURS PRN
Status: DISCONTINUED | OUTPATIENT
Start: 2023-11-23 | End: 2023-11-25 | Stop reason: HOSPADM

## 2023-11-23 RX ORDER — QUETIAPINE FUMARATE 25 MG/1
12.5 TABLET, FILM COATED ORAL EVERY 24 HOURS
Status: DISCONTINUED | OUTPATIENT
Start: 2023-11-23 | End: 2023-11-25 | Stop reason: HOSPADM

## 2023-11-23 RX ADMIN — Medication 10 ML: at 23:31

## 2023-11-23 RX ADMIN — DIVALPROEX SODIUM 125 MG: 125 TABLET, DELAYED RELEASE ORAL at 08:04

## 2023-11-23 RX ADMIN — AMIODARONE HYDROCHLORIDE 0.5 MG/MIN: 50 INJECTION, SOLUTION INTRAVENOUS at 18:09

## 2023-11-23 RX ADMIN — Medication 10 ML: at 08:04

## 2023-11-23 RX ADMIN — AMIODARONE HYDROCHLORIDE 150 MG: 50 INJECTION, SOLUTION INTRAVENOUS at 08:41

## 2023-11-23 RX ADMIN — AMIODARONE HYDROCHLORIDE 1 MG/MIN: 50 INJECTION, SOLUTION INTRAVENOUS at 09:25

## 2023-11-23 RX ADMIN — ENOXAPARIN SODIUM 60 MG: 100 INJECTION SUBCUTANEOUS at 23:30

## 2023-11-23 RX ADMIN — Medication 10 ML: at 12:08

## 2023-11-23 RX ADMIN — Medication 400 MG: at 08:04

## 2023-11-23 RX ADMIN — METOPROLOL TARTRATE 50 MG: 50 TABLET ORAL at 08:04

## 2023-11-23 RX ADMIN — SODIUM CHLORIDE, POTASSIUM CHLORIDE, SODIUM LACTATE AND CALCIUM CHLORIDE: 600; 310; 30; 20 INJECTION, SOLUTION INTRAVENOUS at 12:16

## 2023-11-23 RX ADMIN — ENOXAPARIN SODIUM 60 MG: 100 INJECTION SUBCUTANEOUS at 08:04

## 2023-11-23 RX ADMIN — QUETIAPINE FUMARATE 12.5 MG: 200 TABLET ORAL at 12:02

## 2023-11-23 RX ADMIN — CEFTRIAXONE SODIUM 1000 MG: 1 INJECTION, POWDER, FOR SOLUTION INTRAMUSCULAR; INTRAVENOUS at 17:30

## 2023-11-23 ASSESSMENT — ENCOUNTER SYMPTOMS
STRIDOR: 0
COUGH: 0
NAUSEA: 0
GASTROINTESTINAL NEGATIVE: 1
RESPIRATORY NEGATIVE: 1
SHORTNESS OF BREATH: 0
WHEEZING: 0
CHEST TIGHTNESS: 0
EYES NEGATIVE: 1
BLOOD IN STOOL: 0

## 2023-11-23 ASSESSMENT — PAIN SCALES - GENERAL: PAINLEVEL_OUTOF10: 0

## 2023-11-23 ASSESSMENT — PAIN SCALES - PAIN ASSESSMENT IN ADVANCED DEMENTIA (PAINAD): BREATHING: 0

## 2023-11-23 NOTE — PROGRESS NOTES
[facility]  Facility/Department: [facility] TELEMETRY  Speech Language Pathology   Treatment Note      Dulce Mead  1935  [id number]  [x]   confirmed      Date: 2023    Disorientation [R41.0]  Atrial fibrillation with RVR ([address]) [I48.91]  Urinary tract infection without hematuria, site unspecified [N39.0]    Restrictions/Precautions: Fall Risk    Diet NPO Exceptions are: Sips of Water with Meds      Room air  No active isolations      Subjective:  Self Limiting     Per nursing report, pt tolerated crushed pills in puree earlier this date without difficulty. Patient was repositioned upright in bed, eyes remained closed. Patient initially resistant with saying \"no\" and waving heads, however accepted bolus trials this session. Pt answered occasional simple yes/no questions, but otherwise did not verbalize in session. Interventions used this date:  Dysphagia Treatment      Objective/Assessment:  Patient progressing towards goals:  Goal 1: Pt will tolerate PO trials of puree/thin as deemed appropriate by treating SLP in 5/5 trials with adequate oral clearance of bolus and no overt s/s of aspiration in all opportunities. Patient given applesauce x 3 trials with increased time for bolus manipulation and propulsion, no oral residue post swallow, no overt s/s of aspiration. Patient given thin water via straw x 3 with good bolus acceptance and labial seal, prompt bolus propulsion, and prompt swallow initiation. No overt s/s of aspiration occurred. Goal 2: Pt will participate in formal instrument assessment (MBSS) if deemed appropriate by treating SLP in order to further assess swallow function and determine future therapy goals. Goal 3: Pt will tolerate thermal gustatory stimulation in 100% of opportunities to stimulate the swallow and improve swallow onset time.     Rec: initiate po diet of puree with thin liquids, assist feed  Rec: pills crushed in puree  Discussed with Rhonda REDMAN    Update POC to the following:  Pt will tolerate puree diet with thin liquids with adequate oral clearance and no overt s/s of difficulty or aspiration. 2. Pt will tolerate trials of minced and moist with adequate oral clearance and no overt s/s of difficulty or aspiration. 3.  Pt will demonstrate recommended swallow strategies for safe and efficient swallow at mod assist level. Treatment/Activity Tolerance:  Patient limited by agitation and Patient limited by confusion    Plan: Alter current POC (see above)    Pain Assessment:  Patient does not c/o pain. Pain Re-assessment:  Patient does not c/o pain. Patient/Caregiver Education:  No education provided at this time. Safety Devices:  Bed alarm in place and Call light within reach      Therapy Time  SLP Individual Minutes  Time In: 1050  Time Out: 1103  Minutes: 13            Signature: Electronically signed by Philip Camara.  SHAGUFTA Perez on 11/23/2023 at 11:21 AM

## 2023-11-23 NOTE — PROGRESS NOTES
Subjective:      Patient ID: Keon Marino is a pleasant 82 y.o. female who presents today for:  Chief Complaint   Patient presents with    Other       [address] [facility]    Patient had fall today to her backside, no evidence of serious injury. No new bruising or hematoma. Patient's BP has been trending on the low end. Will perform routine BPs we will forward currently taking combination diltiazem ER to 40 mg p.o. every morning and metoprolol tartrate 50 mg p.o. twice daily. Will plan on decreasing diltiazem and monitor BP every shift x 3 days. Request eval to be sent to office to respond. No further concerns or issues noted at this time.     Patient Active Problem List   Diagnosis    Depression with anxiety    Hypertension    GERD (gastroesophageal reflux disease)    Elevated cholesterol    Atrial fibrillation with RVR (HCC)    Gait disturbance    Lumbar radicular pain    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 degeneration of both eyes    Spinal stenosis of lumbar region with radiculopathy    Senile osteoporosis    Right foot infection    Urinary tract infection without hematuria    AMS (altered mental status)    Coffee ground emesis    Dementia with behavioral disturbance (HCC)    MCI (mild cognitive impairment)    Frontal gait    Chronic pain of both knees    Height loss    Hypercalcemia    Joint stiffness of multiple sites    Multiple closed fractures of ribs of left side    Opioid dependence, daily use (HCC)    Secondary osteoarthritis of multiple sites    Wedge compression fracture of T11-T12 vertebra, initial encounter for closed fracture ([address])    Rhabdomyolysis    COVID-19 virus infection    Uterine procidentia    Severe dementia ([address])    Palliative care encounter    Advanced care planning/counseling discussion    Goals of care, counseling/discussion    DNR (do not resuscitate)    Encounter for hospice care discussion    Decreased oral intake    Tremor    Cervicalgia    Moderate late

## 2023-11-23 NOTE — FLOWSHEET NOTE
3704- Patient's heart rate 140-175 A-Fib. Patient resting in bed, eyes closed. Merrilllinda Luis notified. NO PRN medication available to give. 9585- Patient awakens to voice, easily agitated and confused. Patient cleaned up from urine. Medications given crushed in applesauce. New orders for amiodarone placed by Dr. Katerine Gold. Heart rate still elevated. 1127- Speech in to see patient for a swallow evaluation and recommended pureed with thin liquids. Dr. Jill Alvarado aware and new orders placed. Heart rate now ranging from 100-120.  1200- Family in to see patient. Electronically signed by Rhianna Russo on 11/23/2023 at 3:43 PM

## 2023-11-23 NOTE — PROGRESS NOTES
Physical Therapy Missed Treatment   Facility/Department: [facility] MED SURG W102/[id number]    NAME: Sacha Nayak    : 1935 (82 y.o.)  MRN: [id number]    Account: [de-identified]  Gender: female    Orders received, chart reviewed. Attempted PT evaluation at 1055. Several attempts made to arouse patient, with patient demonstrating difficulty keeping eyes open. RN Aware. Will follow and attempt PT evaluation again at earliest availability.        Adrian Jett, PT, 23 at 10:59 AM

## 2023-11-23 NOTE — PROGRESS NOTES
PROGRESS NOTE      Patient Name: Tianna Sandoval  Admit Date: 2023  1:55 PM  MR #: [id number]  : 1935    Attending Physician: Alberto Payton MD  Reason for consult: AF    History of Presenting Illness:      Tianna Sandoval is a 82 y.o. female on hospital day 3 with a history of . History Obtained From:   electronic medical record    Pt is sent from NH due to agitation worse confusion. She does have underlying Dementia. We are asked to evaluate for Rapid AF rates have been 90 to 170 overnight. Already on Cardizem gtt. She has been on Eliquis at the [facility]. She has long h/o AF.   2023 Echo EF 60-65%    She appears comfortable currently and not agitated, she is confused. Records reflect that she is DNR- CCA    23 Telemetry SR 72 converted overnight. Frequent agitation. She hit few staff yesterday. She is confused today.      2023  Patient still confused and refusing p.o. meds  This morning patient flipped back into atrial fibrillation with RVR  Amiodarone was restarted      History:      EKG:Rapid AF  Past Medical History:   Diagnosis Date    A-fib ([address])     Anxiety     Arthritis     Chronic back pain     DEGENERATIVE BACK    Dementia ([address])     Depression     Distal radial fracture     GERD (gastroesophageal reflux disease)     Glaucoma     History of mammography, screening  CAT 2 BILAT    History of osteopenia     Hypertension     meds > 8 yrs / denies TIA or stroke    Lumbar radicular pain     Polycythemia vera(238.4)     Rib fracture 2018    Senile osteoporosis     Unspecified dementia, severe, without behavioral disturbance, psychotic disturbance, mood disturbance, and anxiety (HCC)     Urinary tract infection without hematuria 2022     Past Surgical History:   Procedure Laterality Date    APPENDECTOMY      BACK SURGERY      COLONOSCOPY      ENDOSCOPY, COLON, DIAGNOSTIC      EYE SURGERY      OS eye glaucoma OR    FRACTURE SURGERY Left 2012    distal sinuses secondary to suboptimal phase of contrast. BRAIN: No mass effect or midline shift. No extra-axial fluid collection. The gray-white differentiation is maintained. Moderate stenosis in the P3 segment of the left PCA. 30% stenosis in the proximal M1 segment of the left MCA. No acute abnormality or flow-limiting stenosis of the major arteries of the neck. CT Head W/O Contrast    Result Date: 11/20/2023  EXAMINATION: CT OF THE HEAD WITHOUT CONTRAST  11/20/2023 3:30 pm TECHNIQUE: CT of the head was performed without the administration of intravenous contrast. Automated exposure control, iterative reconstruction, and/or weight based adjustment of the mA/kV was utilized to reduce the radiation dose to as low as reasonably achievable. COMPARISON: 11/20/2023 HISTORY: ORDERING SYSTEM PROVIDED HISTORY: altered mental status TECHNOLOGIST PROVIDED HISTORY: Reason for exam:->altered mental status Has a \"code stroke\" or \"stroke alert\" been called? ->No Decision Support Exception - unselect if not a suspected or confirmed emergency medical condition->Emergency Medical Condition (MA) What reading provider will be dictating this exam?->CRC FINDINGS: BRAIN/VENTRICLES: There is no acute intracranial hemorrhage, mass effect or midline shift. No abnormal extra-axial fluid collection. The gray-white differentiation is maintained without evidence of an acute infarct. There is no evidence of hydrocephalus. The ventricles, cisterns and sulci are prominent consistent with atrophy. There is decreased attenuation within the periventricular white matter consistent with periventricular leukomalacia. ORBITS: The visualized portion of the orbits demonstrate no acute abnormality. SINUSES: The visualized paranasal sinuses and mastoid air cells demonstrate no acute abnormality. SOFT TISSUES/SKULL:  No acute abnormality of the visualized skull or soft tissues. 1. There is no acute intracranial abnormality.   Specifically, there is

## 2023-11-23 NOTE — PROGRESS NOTES
Neurology Follow up    SUBJECTIVE: Pleasantly confused but not agitated. Patient does not complain of a headache today. No fever was noted and there is no meningeal signs.     Current Facility-Administered Medications   Medication Dose Route Frequency Provider Last Rate Last Admin    amiodarone (CORDARONE) 450 mg in dextrose 5 % 250 mL infusion  1 mg/min IntraVENous Continuous Sydnee Sapp MD 33.3 mL/hr at 11/23/23 0925 1 mg/min at 11/23/23 0925    Followed by    amiodarone (CORDARONE) 450 mg in dextrose 5 % 250 mL infusion  0.5 mg/min IntraVENous Continuous Sydnee Sapp MD        metoprolol (LOPRESSOR) injection 5 mg  5 mg IntraVENous Q6H PRN Sydnee Sapp MD        amiodarone (CORDARONE) tablet 200 mg  200 mg Oral BID Lane CASTILLO MD   200 mg at 11/22/23 2036    QUEtiapine (SEROQUEL) tablet 12.5 mg  12.5 mg Oral BID Sebastián Hernandez MD   12.5 mg at 11/22/23 2038    busPIRone (BUSPAR) tablet 5 mg  5 mg Oral Nightly Sebastián Hernandez MD   5 mg at 11/22/23 2036    magnesium oxide (MAG-OX) tablet 400 mg  400 mg Oral Daily Lane CASTILLO MD   400 mg at 11/23/23 0804    divalproex (DEPAKOTE) DR tablet 125 mg  125 mg Oral BID Sebastián Hernandez MD   125 mg at 11/23/23 0804    [Held by provider] dilTIAZem (CARDIZEM CD) extended release capsule 240 mg  240 mg Oral Daily Sebastián Hernandez MD        metoprolol tartrate (LOPRESSOR) tablet 50 mg  50 mg Oral BID Sebastián Hernandez MD   50 mg at 11/23/23 0804    mirtazapine (REMERON) tablet 7.5 mg  7.5 mg Oral Nightly Sebastián Hernandez MD        sodium chloride flush 0.9 % injection 5-40 mL  5-40 mL IntraVENous 2 times per day Stanley Nunes MD   10 mL at 11/21/23 2202    sodium chloride flush 0.9 % injection 5-40 mL  5-40 mL IntraVENous PRN Asad SHELDON MD        0.9 % sodium chloride infusion   IntraVENous PRN Asad SHELDON MD        sodium chloride flush 0.9 % injection 10 mL  10 mL IntraVENous 2 times per day Sebastián Hernandez MD   10 mL at 11/23/23 0804    sodium chloride

## 2023-11-24 VITALS
HEIGHT: 63 IN | TEMPERATURE: 99.2 F | HEART RATE: 97 BPM | DIASTOLIC BLOOD PRESSURE: 125 MMHG | RESPIRATION RATE: 20 BRPM | BODY MASS INDEX: 24.27 KG/M2 | SYSTOLIC BLOOD PRESSURE: 172 MMHG | WEIGHT: 137 LBS | OXYGEN SATURATION: 96 %

## 2023-11-24 PROBLEM — F02.B0 MODERATE LATE ONSET ALZHEIMER'S DEMENTIA (HCC): Status: ACTIVE | Noted: 2023-11-24

## 2023-11-24 PROBLEM — M54.2 CERVICALGIA: Status: ACTIVE | Noted: 2023-11-24

## 2023-11-24 PROBLEM — Z51.5 PALLIATIVE CARE ENCOUNTER: Status: ACTIVE | Noted: 2023-11-24

## 2023-11-24 PROBLEM — R63.8 DECREASED ORAL INTAKE: Status: ACTIVE | Noted: 2023-11-24

## 2023-11-24 PROBLEM — Z71.89 GOALS OF CARE, COUNSELING/DISCUSSION: Status: ACTIVE | Noted: 2023-11-24

## 2023-11-24 PROBLEM — Z71.89 ADVANCED CARE PLANNING/COUNSELING DISCUSSION: Status: ACTIVE | Noted: 2023-11-24

## 2023-11-24 PROBLEM — Z71.89 ENCOUNTER FOR HOSPICE CARE DISCUSSION: Status: ACTIVE | Noted: 2023-11-24

## 2023-11-24 PROBLEM — R25.1 TREMOR: Status: ACTIVE | Noted: 2023-11-24

## 2023-11-24 PROBLEM — G30.1 MODERATE LATE ONSET ALZHEIMER'S DEMENTIA (HCC): Status: ACTIVE | Noted: 2023-11-24

## 2023-11-24 PROBLEM — Z66 DNR (DO NOT RESUSCITATE): Status: ACTIVE | Noted: 2023-11-24

## 2023-11-24 LAB
ANION GAP SERPL CALCULATED.3IONS-SCNC: 15 MEQ/L (ref 9–15)
BASOPHILS # BLD: 0.1 K/UL (ref 0–0.2)
BASOPHILS NFR BLD: 0.8 %
BUN SERPL-MCNC: 11 MG/DL (ref 8–23)
CALCIUM SERPL-MCNC: 8.6 MG/DL (ref 8.5–9.9)
CHLORIDE SERPL-SCNC: 95 MEQ/L (ref 95–107)
CO2 SERPL-SCNC: 24 MEQ/L (ref 20–31)
CREAT SERPL-MCNC: 0.49 MG/DL (ref 0.5–0.9)
EOSINOPHIL # BLD: 0.1 K/UL (ref 0–0.7)
EOSINOPHIL NFR BLD: 1 %
ERYTHROCYTE [DISTWIDTH] IN BLOOD BY AUTOMATED COUNT: 13.5 % (ref 11.5–14.5)
GLUCOSE SERPL-MCNC: 92 MG/DL (ref 70–99)
HCT VFR BLD AUTO: 49.4 % (ref 37–47)
HGB BLD-MCNC: 16.7 G/DL (ref 12–16)
LYMPHOCYTES # BLD: 1.7 K/UL (ref 1–4.8)
LYMPHOCYTES NFR BLD: 16 %
MAGNESIUM SERPL-MCNC: 1.7 MG/DL (ref 1.7–2.4)
MCH RBC QN AUTO: 31.7 PG (ref 27–31.3)
MCHC RBC AUTO-ENTMCNC: 33.8 % (ref 33–37)
MCV RBC AUTO: 93.9 FL (ref 79.4–94.8)
MONOCYTES # BLD: 1.9 K/UL (ref 0.2–0.8)
MONOCYTES NFR BLD: 17.7 %
NEUTROPHILS # BLD: 6.7 K/UL (ref 1.4–6.5)
NEUTS SEG NFR BLD: 63.9 %
PLATELET # BLD AUTO: 213 K/UL (ref 130–400)
PLATELET BLD QL SMEAR: ADEQUATE
POTASSIUM SERPL-SCNC: 3.1 MEQ/L (ref 3.4–4.9)
RBC # BLD AUTO: 5.26 M/UL (ref 4.2–5.4)
RBC MORPH BLD: NORMAL
SLIDE REVIEW: ABNORMAL
SODIUM SERPL-SCNC: 134 MEQ/L (ref 135–144)
WBC # BLD AUTO: 10.5 K/UL (ref 4.8–10.8)

## 2023-11-24 PROCEDURE — 36415 COLL VENOUS BLD VENIPUNCTURE: CPT

## 2023-11-24 PROCEDURE — 99223 1ST HOSP IP/OBS HIGH 75: CPT

## 2023-11-24 PROCEDURE — 99233 SBSQ HOSP IP/OBS HIGH 50: CPT | Performed by: INTERNAL MEDICINE

## 2023-11-24 PROCEDURE — 2580000003 HC RX 258: Performed by: INTERNAL MEDICINE

## 2023-11-24 PROCEDURE — 83735 ASSAY OF MAGNESIUM: CPT

## 2023-11-24 PROCEDURE — 6360000002 HC RX W HCPCS: Performed by: INTERNAL MEDICINE

## 2023-11-24 PROCEDURE — 97535 SELF CARE MNGMENT TRAINING: CPT

## 2023-11-24 PROCEDURE — 6370000000 HC RX 637 (ALT 250 FOR IP): Performed by: INTERNAL MEDICINE

## 2023-11-24 PROCEDURE — 97162 PT EVAL MOD COMPLEX 30 MIN: CPT

## 2023-11-24 PROCEDURE — 85025 COMPLETE CBC W/AUTO DIFF WBC: CPT

## 2023-11-24 PROCEDURE — 80048 BASIC METABOLIC PNL TOTAL CA: CPT

## 2023-11-24 PROCEDURE — 92526 ORAL FUNCTION THERAPY: CPT

## 2023-11-24 PROCEDURE — 1210000000 HC MED SURG R&B

## 2023-11-24 RX ORDER — LANOLIN ALCOHOL/MO/W.PET/CERES
400 CREAM (GRAM) TOPICAL DAILY
Qty: 30 TABLET | DISCHARGE
Start: 2023-11-25

## 2023-11-24 RX ORDER — AMIODARONE HYDROCHLORIDE 200 MG/1
200 TABLET ORAL 2 TIMES DAILY
Qty: 60 TABLET | Refills: 1 | DISCHARGE
Start: 2023-11-24

## 2023-11-24 RX ORDER — CEFUROXIME AXETIL 250 MG/1
250 TABLET ORAL 2 TIMES DAILY
Qty: 6 TABLET | Refills: 0 | Status: SHIPPED | OUTPATIENT
Start: 2023-11-24 | End: 2023-11-27

## 2023-11-24 RX ORDER — CEFUROXIME AXETIL 500 MG/1
500 TABLET ORAL 2 TIMES DAILY
Qty: 6 TABLET | Refills: 0 | DISCHARGE
Start: 2023-11-24 | End: 2023-11-24 | Stop reason: SDUPTHER

## 2023-11-24 RX ORDER — QUETIAPINE FUMARATE 25 MG/1
12.5 TABLET, FILM COATED ORAL EVERY 24 HOURS
Qty: 60 TABLET | Refills: 3 | DISCHARGE
Start: 2023-11-24

## 2023-11-24 RX ADMIN — DIVALPROEX SODIUM 125 MG: 125 TABLET, DELAYED RELEASE ORAL at 09:33

## 2023-11-24 RX ADMIN — Medication 400 MG: at 09:33

## 2023-11-24 RX ADMIN — ENOXAPARIN SODIUM 60 MG: 100 INJECTION SUBCUTANEOUS at 09:33

## 2023-11-24 RX ADMIN — CEFTRIAXONE SODIUM 1000 MG: 1 INJECTION, POWDER, FOR SOLUTION INTRAMUSCULAR; INTRAVENOUS at 17:54

## 2023-11-24 RX ADMIN — ACETAMINOPHEN 650 MG: 325 TABLET ORAL at 09:32

## 2023-11-24 RX ADMIN — ACETAMINOPHEN 650 MG: 325 TABLET ORAL at 17:44

## 2023-11-24 RX ADMIN — DILTIAZEM HYDROCHLORIDE 240 MG: 240 CAPSULE, EXTENDED RELEASE ORAL at 09:32

## 2023-11-24 RX ADMIN — QUETIAPINE FUMARATE 12.5 MG: 25 TABLET ORAL at 17:44

## 2023-11-24 RX ADMIN — Medication 10 ML: at 09:34

## 2023-11-24 RX ADMIN — METOPROLOL TARTRATE 50 MG: 50 TABLET ORAL at 09:32

## 2023-11-24 RX ADMIN — DIVALPROEX SODIUM 125 MG: 125 TABLET, DELAYED RELEASE ORAL at 02:31

## 2023-11-24 ASSESSMENT — ENCOUNTER SYMPTOMS
WHEEZING: 0
SHORTNESS OF BREATH: 0
RESPIRATORY NEGATIVE: 1
BLOOD IN STOOL: 0
EYES NEGATIVE: 1
CHEST TIGHTNESS: 0
GASTROINTESTINAL NEGATIVE: 1
STRIDOR: 0
COUGH: 0
NAUSEA: 0

## 2023-11-24 ASSESSMENT — PAIN SCALES - PAIN ASSESSMENT IN ADVANCED DEMENTIA (PAINAD)
BREATHING: 0

## 2023-11-24 ASSESSMENT — VISUAL ACUITY: OU: 1

## 2023-11-24 NOTE — PROGRESS NOTES
[facility] EMERGENCY [facility] AT [facility]  Facility/Department: [facility] TELEMETRY  Speech Language Pathology   Treatment Note      Magdy Buckley  1935  [id number]  [x]   confirmed      Date: 2023    Disorientation [R41.0]  Atrial fibrillation with RVR ([address]) [I48.91]  Urinary tract infection without hematuria, site unspecified [N39.0]    Restrictions/Precautions: Fall Risk    ADULT DIET; Dysphagia - Pureed; Low Sodium (2 gm)      Room air  No active isolations      Subjective:  Alert, Cooperative, and Pleasant      Patient repositioned upright in bed, eyes open  Patient's meal tray in room, ate approximately half and drank half of juice with straw  Pt's caregiver stated she stopped eating and pushed the tray away when she was finished, indicating that she was full. Pt caregiver stated she may not enjoy the taste of the food. Pt answered simple yes/no questions, but otherwise did not verbalize  Pt agreed to limited bolus trials for session secondary to being full    Interventions used this date:  Dysphagia Treatment      Objective/Assessment:  Patient progressing towards goals:  Short-term Goals  Timeframe for Short-term Goals: 1 week or LOS until goals are met. Goal 1: Pt will tolerate puree diet with thin liquids with adequate oral clearance and no overt s/s of difficulty or aspiration. Pt given applesauce x 3 trials SLP-presented with increased time for bolus manipulation and propulsion, good hyolaryngeal elevation, no oral residue, no overt s/s of aspiration. Pt given thin water via straw x 3 with good labial seal, prompt bolus propulsion, and prompt swallow initiation. No difficulty reported and no overt s/s of aspiration observed. Goal 2: Pt will tolerate trials of minced and moist with adequate oral clearance and no overt s/s of difficulty or aspiration. Goal 3: Pt will demonstrate recommended swallow strategies for safe and efficient swallow at mod assist level.   Educated pt and caregiver on sitting up right You were seen in the emergency department for shortness of breath. You were given a breathing treatment by respiratory therapy which helped your breathing. We will discharge you with a short course of steroids. Please  your prescription for your albuterol inhaler today. Please return to the emergency department if your shortness of breath worsens or you develop fever or chills. Please follow-up with your primary care provider. in bed. Treatment/Activity Tolerance:  Patient tolerated treatment well    Plan:  Continue per POC    Pain Assessment:  Patient does not c/o pain. Pain Re-assessment:  Patient does not c/o pain. Patient/Caregiver Education:  Patient educated on session and progression towards goals. Caregiver education on session and progress towards goals. Safety Devices:   All fall risk precautions in place, Bed alarm in place, Call light within reach, and Telesitter in use      Therapy Time  SLP Individual Minutes  Time In: 1150  Time Out: 1200  Minutes: 10            Signature: Electronically signed by SHAGUFTA Licea on 11/24/2023 at 12:12 PM

## 2023-11-24 NOTE — PROGRESS NOTES
Discussed with Dr. Ricardo Chávez, pt in SR and compliant with taking all med's this morning. VWRB stop amio gtt. Plan of care ongoing.

## 2023-11-24 NOTE — PROGRESS NOTES
Physical Therapy Med Surg Initial Assessment  Facility/Department: [facility]  Room: [facility]812-18       NAME: Cipriano Metzger  : 1935 (46 y.o.)  MRN: [id number]  CODE STATUS: DNR-CCA    Date of Service: 2023    Patient Diagnosis(es): Disorientation [R41.0]  Atrial fibrillation with RVR ([address]) [I48.91]  Urinary tract infection without hematuria, site unspecified [N39.0]   Chief Complaint   Patient presents with    Fall     Saturday unwitnessed fall    Altered Mental Status     Since Friday.  Given Haldol and ativan on Friday and has been sleepy     Patient Active Problem List    Diagnosis Date Noted    Right foot infection 2022    Urinary tract infection without hematuria 2022    Palliative care encounter 2023    Advanced care planning/counseling discussion 2023    Goals of care, counseling/discussion 2023    DNR (do not resuscitate) 2023    Encounter for hospice care discussion 2023    Decreased oral intake 2023    Tremor 2023    Cervicalgia 2023    Moderate late onset Alzheimer's dementia ([address]) 2023    Severe dementia ([address]) 2023    Rhabdomyolysis 2023    COVID-19 virus infection 2023    Uterine procidentia 2023    Dementia with behavioral disturbance ([address]) 04/10/2023    MCI (mild cognitive impairment) 04/10/2023    Frontal gait 04/10/2023    Coffee ground emesis 2023    AMS (altered mental status) 2023    Senile osteoporosis     Multiple closed fractures of ribs of left side 2018    Hypercalcemia 2018    Chronic pain of both knees 2018    Height loss 2018    Joint stiffness of multiple sites 2018    Secondary osteoarthritis of multiple sites 2018    Opioid dependence, daily use ([address]) 2018    Wedge compression fracture of T11-T12 vertebra, initial encounter for closed fracture ([address]) 12/15/2017    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

## 2023-11-24 NOTE — PROGRESS NOTES
[facility] OCCUPATIONAL THERAPY MED SURG TREATMENT NOTE     Date: 2023  Patient Name: Magdy Buckley        MRN: [id number]  Account: [de-identified]   : 1935  (82 y.o.)  [id number]    Chart Review:    Restrictions        Safety:  Safety Devices  Type of Devices: All fall risk precautions in place    Patient's birthday verified: Yes    Subjective:  \"It's easier said than done\" (When OT prompted to push up with L hand on bed, and roll side to side.)     Pain at start of treatment: No    Pain at end of treatment: Yes: Pt. unable to rate pain- pt expressed pain with all movement when attempting bed mobility; not able to rate pain     Location: overall pain, specifically in pain at neck and BLEs  Description unable to express- pt cried out in pain     Objective: Pt attempted to participate in bed mobility and transfers for increased IND with ADLs. Pt unable to participate in therapeutic activities.      Therapy key for assistance levels -   Independent/Mod I = Pt. is able to perform task with no assistance but may require a device   Stand by assistance = Pt. does not perform task at an independent level but does not need physical assistance, requires verbal cues  Minimal, Moderate, Maximal Assistance = Pt. requires physical assistance (25%, 50%, 75% assist from helper) for task but is able to actively participate in task   Dependent = Pt. requires total assistance with task and is not able to actively participate with task completion    Orientation Status:  Orientation  Orientation Level: Oriented to person    Observation:  Observation/Palpation  Observation: flexion of neck, unable to support self in seated position    Cognition Status:  Cognition  Arousal/Alertness: Delayed responses to stimuli  Following Commands: Inconsistently follows commands  Attention Span: Attends with cues to redirect  Memory: Decreased recall of biographical Information;Decreased short term memory;Decreased recall of to follow directions  Activity Tolerance: Patient limited by pain; Patient limited by endurance;Treatment limited secondary to decreased cognition  Discharge Recommendations: Continue to assess pending progress       [employer]  How much help is needed for putting on and taking off regular lower body clothing?: Total  How much help is needed for bathing (which includes washing, rinsing, drying)?: A Lot  How much help is needed for toileting (which includes using toilet, bedpan, or urinal)?: A Lot  How much help is needed for putting on and taking off regular upper body clothing?: A Lot  How much help is needed for taking care of personal grooming?: A Lot  How much help for eating meals?: None  AM-[facility] Inpatient Daily Activity Raw Score: 13  AM-PAC Inpatient ADL T-Scale Score : 32.03  ADL Inpatient CMS 0-100% Score: 63.03  ADL Inpatient CMS G-Code Modifier : CL    Plan:    Continue OT per POC- pt needs 2 person assist at next session for bed mobility, transfers and ADLs    Patient Education:  Patient Education  Education Given To: Patient  Education Provided Comments: Pt educated on importance of movement while in the hospital for increased strength and endurance, pt educated on strategies for transfers, pt not able to follow directions  Education Method: Verbal;Demonstration  Barriers to Learning: Cognition  Education Outcome: Unable to demonstrate understanding;Continued education needed    Equipment recommendations:  OT Equipment Recommendations  Other: continue to assess    Goals/Plan of care addressed during this session:        Patient Goal: Patient goals : Not stated at this time    Improve Balance, Improve Strength, Improve Ferney with ADLs, Improve Ferney with Functional Transfers, Improve Cognition/Safety awareness, and Improve Endurance    Therapy Time:   Individual Group Co-Treat   Time In 1335       Time Out 1351         Minutes 16                ADL/IADL trainin minutes    Electronically

## 2023-11-24 NOTE — PROGRESS NOTES
Neurology Follow up    SUBJECTIVE: Pleasantly confused but not agitated. Patient does not complain of a headache today. No fever was noted and there is no meningeal signs. 11/24  Pleasantly confused and at baseline. No other findings noted she does not collect any neck pain at this time. No meningeal signs noted.     Current Facility-Administered Medications   Medication Dose Route Frequency Provider Last Rate Last Admin    amiodarone (CORDARONE) 450 mg in dextrose 5 % 250 mL infusion  0.5 mg/min IntraVENous Continuous Kristie Yung MD 16.7 mL/hr at 11/24/23 0729 0.5 mg/min at 11/24/23 0729    metoprolol (LOPRESSOR) injection 5 mg  5 mg IntraVENous Q6H PRN Kristie Yung MD        QUEtiapine (SEROQUEL) tablet 12.5 mg  12.5 mg Oral Q24H Carol Martinez MD        amiodarone (CORDARONE) tablet 200 mg  200 mg Oral BID Ursula CASTILLO MD   200 mg at 11/22/23 2036    busPIRone (BUSPAR) tablet 5 mg  5 mg Oral Nightly Carol Martinez MD   5 mg at 11/22/23 2036    magnesium oxide (MAG-OX) tablet 400 mg  400 mg Oral Daily Ursula CASTILLO MD   400 mg at 11/24/23 0933    divalproex (DEPAKOTE) DR tablet 125 mg  125 mg Oral BID Carol Martinez MD   125 mg at 11/24/23 0933    dilTIAZem (CARDIZEM CD) extended release capsule 240 mg  240 mg Oral Daily Carol Martinez MD   240 mg at 11/24/23 0932    metoprolol tartrate (LOPRESSOR) tablet 50 mg  50 mg Oral BID Carol Martinez MD   50 mg at 11/24/23 0932    mirtazapine (REMERON) tablet 7.5 mg  7.5 mg Oral Nightly Carol Martinez MD        sodium chloride flush 0.9 % injection 5-40 mL  5-40 mL IntraVENous 2 times per day Jess Jordan MD   10 mL at 11/23/23 2331    sodium chloride flush 0.9 % injection 5-40 mL  5-40 mL IntraVENous PRN Jorge Alberto Kaur MD        0.9 % sodium chloride infusion   IntraVENous PRN Jorge Alberto Kaur MD        sodium chloride flush 0.9 % injection 10 mL  10 mL IntraVENous 2 times per day Carol Martinez MD   10 mL at 11/24/23 7498    sodium chloride Tongue movement:  normal    Motor: Subtle tremor notable in the left hand and could be a parkinsonian tremor. Drift:  absent  Motor exam is symmetrical 5 out of 5 all extremities bilaterally  Tone:  normal  Abnormal Movements:  absent            Sensory:        Pinprick             Right Upper Extremity:  normal             Left Upper Extremity:  normal             Right Lower Extremity:  normal             Left Lower Extremity:  normal           Vibration                         Touch            Proprioception                 Coordination:           Finger/Nose   Right:  normal              Left:  normal          Heel-Knee-Shin                Right:  normal              Left:  normal          Rapid Alternating Movements              Right:  normal              Left:  normal          Gait:                       Casual: Gait is deferred. Reflexes:             Deep Tendon Reflexes:             Reflexes are 2 +             Plantar response:                Right:  downgoing               Left:  downgoing    Vascular:  Cardiac Exam:  normal         CT CSpine W/O Contrast    Result Date: 11/20/2023  EXAMINATION: CT OF THE CERVICAL SPINE WITHOUT CONTRAST 11/20/2023 5:42 pm TECHNIQUE: CT of the cervical spine was performed without the administration of intravenous contrast. Multiplanar reformatted images are provided for review. Automated exposure control, iterative reconstruction, and/or weight based adjustment of the mA/kV was utilized to reduce the radiation dose to as low as reasonably achievable. COMPARISON: None. HISTORY: ORDERING SYSTEM PROVIDED HISTORY: fall TECHNOLOGIST PROVIDED HISTORY: Reason for exam:->fall Decision Support Exception - unselect if not a suspected or confirmed emergency medical condition->Emergency Medical Condition (MA) What reading provider will be dictating this exam?->CRC FINDINGS: BONES/ALIGNMENT: There is no acute fracture or traumatic malalignment.  DEGENERATIVE CHANGES: There

## 2023-11-24 NOTE — PROGRESS NOTES
PROGRESS NOTE      Patient Name: Veronika   Admit Date: 2023  1:55 PM  MR #: [id number]  : 1935    Attending Physician: Delbert Colbert MD  Reason for consult: AF    History of Presenting Illness:      Veronika  is a 82 y.o. female on hospital day 4 with a history of . History Obtained From:   electronic medical record    Pt is sent from NH due to agitation worse confusion. She does have underlying Dementia. We are asked to evaluate for Rapid AF rates have been 90 to 170 overnight. Already on Cardizem gtt. She has been on Eliquis at the [facility]. She has long h/o AF.   2023 Echo EF 60-65%    She appears comfortable currently and not agitated, she is confused. Records reflect that she is DNR- CCA    23 Telemetry SR 72 converted overnight. Frequent agitation. She hit few staff yesterday. She is confused today. 2023  Patient still confused and refusing p.o. meds  This morning patient flipped back into atrial fibrillation with RVR  Amiodarone was restarted    2023  Patient laying in bed looks confused  Patient on Amio drip  Telemetry atrial fibrillation ranging between 90s to 100s  Will continue with amnio drip until patient is able to tolerate p.o.'s.  Patient still refusing p.o. medication.       History:      EKG:Rapid AF  Past Medical History:   Diagnosis Date    A-fib ([address])     Anxiety     Arthritis     Chronic back pain     DEGENERATIVE BACK    Dementia ([address])     Depression     Distal radial fracture     GERD (gastroesophageal reflux disease)     Glaucoma     History of mammography, screening  CAT 2 BILAT    History of osteopenia     Hypertension     meds > 8 yrs / denies TIA or stroke    Lumbar radicular pain     Polycythemia vera(238.4)     Rib fracture 2018    Senile osteoporosis     Unspecified dementia, severe, without behavioral disturbance, psychotic disturbance, mood disturbance, and anxiety (HCC)     Urinary tract infection without images are provided for review. Automated exposure control, iterative reconstruction, and/or weight based adjustment of the mA/kV was utilized to reduce the radiation dose to as low as reasonably achievable.; CTA of the neck was performed with the administration of intravenous contrast. Multiplanar reformatted images are provided for review. MIP images are provided for review. Stenosis of the internal carotid arteries measured using NASCET criteria. Automated exposure control, iterative reconstruction, and/or weight based adjustment of the mA/kV was utilized to reduce the radiation dose to as low as reasonably achievable. COMPARISON: CT head from earlier today HISTORY: ORDERING SYSTEM PROVIDED HISTORY: altered mental status TECHNOLOGIST PROVIDED HISTORY: Reason for exam:->altered mental status Decision Support Exception - unselect if not a suspected or confirmed emergency medical condition->Emergency Medical Condition (MA) What reading provider will be dictating this exam?->CRC FINDINGS: CTA NECK: AORTIC ARCH/ARCH VESSELS: No dissection or arterial injury. No significant stenosis of the brachiocephalic or subclavian arteries. CAROTID ARTERIES: No dissection, arterial injury, or hemodynamically significant stenosis by NASCET criteria. VERTEBRAL ARTERIES: No dissection, arterial injury, or significant stenosis. SOFT TISSUES: There is mild dependent atelectasis at the bilateral posterior lungs. No cervical or superior mediastinal lymphadenopathy. The larynx and pharynx are unremarkable. No acute abnormality of the salivary and thyroid glands. BONES: No acute osseous abnormality. CTA HEAD: ANTERIOR CIRCULATION: There is 30% stenosis in the proximal M1 segment of the left MCA. No significant stenosis of the intracranial internal carotid, anterior cerebral, or right middle cerebral arteries. No aneurysm. POSTERIOR CIRCULATION: There is moderate stenosis in the P3 segment of the left PCA.   No significant stenosis of

## 2023-11-24 NOTE — PROGRESS NOTES
Patient discharged, waiting for ambulance  at 2030. IV removed, telemetry box and leads removed and returned. Lockbox emptied. All belongings gathered and returned to patient. Discharge instructions reviewed with POA and nursing facility, all questions answered by RN. Prescriptions faxed. No further needs.

## 2023-11-24 NOTE — PROGRESS NOTES
Pt continues to be agitated and uncooperative for EEG. Hooked up half of head, pt continued moving, touching and pulling off leads. Says they hurt her head. My second attempt at EEG.

## 2023-11-24 NOTE — CONSULTS
Palliative Care Consult Note  Patient: Millicent Valadez  Gender: female  YOB: 1935  Unit/Bed: Z130/D424-70  CodeStatus: DNR-CCA  Inpatient Treatment Team: Treatment Team: Attending Provider: Mila Wang MD; Consulting Physician: Marcy Gould MD; Consulting Physician: Yoli Martines MD; Consulting Physician: Kaleb Grant MD; Registered Nurse: Jaquelin Goldstein, RN; Registered Nurse: Lizzette Ortiz, ЮЛИЯ; : Princess Valencia, ЮЛИЯ; Respiratory Therapist (Day): Janki Clifton RCP; Registered Nurse: Zaire Banegas, ЮЛИЯ; Patient Care Tech: [place] [employer]; Utilization Reviewer: Linwood Rodriguez RN  Admit Date:  11/20/2023    Chief Complaint: Altered mental status and fall    History of Presenting Illness:      Millicent Valadez is a 82 y.o. female on hospital day 4 with a history of dementia with behavioral disturbance, A-fib, GERD, anxiety, HTN, osteopenia, chronic back pain, arthritis, UTI. Patient was brought to the emergency room with fall and altered mental status. Patient was admitted in the setting of acute metabolic encephalopathy secondary to UTI. Palliative care was consulted by Dr. Mila Wang MD for goals of care and end-stage disease. Upon entering the room I find the patient A&O X0-1 with kristine-sure monitor for safety. Staff admit patient become aggressive, doen't want to eat and irritated when they try to roll her or change the patient. Patient's previous functional status is A&O 2-3 per patient's daughters. Patient lives at [facility]. Patient/health caregiver admits to a small about of weight loss with recent illness. Reviewed patient's current goals of care, advance care planning, hospice care and benefits of palliative care in the outpatient setting. Patient's family admits to not wanting a PEG tube or tube feeding as patient would most likely pull it out/removed device.   Patient's family states that patient was previously eating well and they would visit improve to her previous baseline and eat on her own. Decreased oral intake: Patient with mood disturbance currently on mirtazapine 7.5 mg p.o. nightly to aid with depression, sleep and appetite stimulation. Patient's family not wishing to change current medication as patient is acutely ill. Palliative care would consider increasing/maximizing Remeron to 15 mg p.o. nightly for potential appetite improvement if current dose is unsuccessful. Continue with current plan of care, no changes. Dementia with behavior: Patient currently on Seroquel 12.5 mg p.o. daily at 1800. If patient continues to be aggressive/agitated patient may benefit from increase or additional doses during the day. Patient's family unwilling to change current regimen related to previous sedation with fall. Palliative care will allow patient time for healing as she currently also has a UTI before consideration of medication alterations. Continue with current plan of care, no changes. I have discussed/reviewed the patient's case with nursing staff and case management.    -Patient is currently being treated for multiple medical conditions by other providers  Acute metabolic encephalopathy secondary to UTI  A-fib with RVR  Dementia with behavioral disturbances  Hypokalemia  Hypomagnesemia  History of HTN    MDM: High    Electronically signed by JULISA Pozo CNP on 11/24/2023 at 8:31 AM    Collaborating physician: Dr. Madison Mays     Please note this report is partially produced by using speech recognition hardware. It may contain errors related to the system, including grammar, punctuation and spelling as well as words and phrases that may seem inaccurate. For any questions or concerns feel free to contact me for clarification. Thanks for the opportunity you have allowed us to provide palliative care to [facility]. We will be in touch as care progresses.  Please feel free to reach out to us should you have any questions or

## 2023-11-25 LAB
BACTERIA BLD CULT ORG #2: NORMAL
BACTERIA BLD CULT: NORMAL

## 2023-11-25 NOTE — FLOWSHEET NOTE
Patient was picked up by physicians ambulance at about [phone], iv out, no tele box, called Daughter Curt Nicole to let her know the patient left.

## 2023-11-26 LAB
HSV1 DNA CSF QL NAA+PROBE: NOT DETECTED
HSV2 DNA CSF QL NAA+PROBE: NOT DETECTED
SPECIMEN SOURCE: NORMAL

## 2023-11-26 NOTE — PROGRESS NOTES
Physical Therapy  Facility/Department: [facility] D268/R186-29  Physical Therapy Discharge      NAME: Wendy Pedroza    : 1935 (82 y.o.)  MRN: [id number]    Account: [de-identified]  Gender: female      Patient has been discharged from acute care hospital. DC patient from current PT program.      Electronically signed by Teodoro Pope PT on 23 at 1:45 PM EST

## 2023-11-28 ENCOUNTER — OFFICE VISIT (OUTPATIENT)
Dept: GERIATRIC MEDICINE | Age: 88
End: 2023-11-28

## 2023-11-28 DIAGNOSIS — F03.918 DEMENTIA WITH BEHAVIORAL DISTURBANCE (HCC): ICD-10-CM

## 2023-11-28 DIAGNOSIS — I10 PRIMARY HYPERTENSION: Primary | ICD-10-CM

## 2023-11-28 DIAGNOSIS — R25.1 TREMOR: ICD-10-CM

## 2023-11-28 DIAGNOSIS — M54.16 LUMBAR RADICULAR PAIN: ICD-10-CM

## 2023-11-30 ENCOUNTER — OFFICE VISIT (OUTPATIENT)
Dept: PALLATIVE CARE | Age: 88
End: 2023-11-30

## 2023-11-30 DIAGNOSIS — F03.B3 MODERATE DEMENTIA WITH MOOD DISTURBANCE, UNSPECIFIED DEMENTIA TYPE (HCC): Primary | ICD-10-CM

## 2023-11-30 DIAGNOSIS — Z71.89 ADVANCED CARE PLANNING/COUNSELING DISCUSSION: ICD-10-CM

## 2023-11-30 DIAGNOSIS — R63.4 UNINTENTIONAL WEIGHT LOSS: ICD-10-CM

## 2023-11-30 DIAGNOSIS — Z51.5 PALLIATIVE CARE ENCOUNTER: ICD-10-CM

## 2023-11-30 DIAGNOSIS — Z71.89 GOALS OF CARE, COUNSELING/DISCUSSION: ICD-10-CM

## 2023-11-30 NOTE — PROGRESS NOTES
recognition hardware. It may contain errors related to the system, including grammar, punctuation and spelling as well as words and phrases that may seem inaccurate. For any questions or concerns feel free to contact me for clarification.

## 2023-12-02 NOTE — PROGRESS NOTES
Results   Component Value Date    TSH 1.710 01/01/2019       Lab Results   Component Value Date    WBC 10.5 11/24/2023    HGB 16.7 (H) 11/24/2023    HCT 49.4 (H) 11/24/2023    MCV 93.9 11/24/2023     11/24/2023       Please note orders entered on site at facility after discussion with appropriate facility nursing/therapy/ / nutritional staff. Current longstanding medical problems and acute medical issues addressed with staff. Available data and data elements in on site paper chart reviewed and analyzed. Current external consultant notes reviewed in on site chart. Ordered laboratory testing and imaging will be reviewed when available.

## 2023-12-07 ENCOUNTER — OFFICE VISIT (OUTPATIENT)
Dept: PALLATIVE CARE | Age: 88
End: 2023-12-07
Payer: COMMERCIAL

## 2023-12-07 DIAGNOSIS — Z71.89 GOALS OF CARE, COUNSELING/DISCUSSION: ICD-10-CM

## 2023-12-07 DIAGNOSIS — Z71.89 ADVANCED CARE PLANNING/COUNSELING DISCUSSION: ICD-10-CM

## 2023-12-07 DIAGNOSIS — Z51.5 PALLIATIVE CARE ENCOUNTER: ICD-10-CM

## 2023-12-07 DIAGNOSIS — F03.B3 MODERATE DEMENTIA WITH MOOD DISTURBANCE, UNSPECIFIED DEMENTIA TYPE (HCC): Primary | ICD-10-CM

## 2023-12-07 PROCEDURE — G9692 HOSP RECD BY PT DUR MSMT PER: HCPCS

## 2023-12-07 PROCEDURE — 99310 SBSQ NF CARE HIGH MDM 45: CPT

## 2023-12-07 PROCEDURE — G8484 FLU IMMUNIZE NO ADMIN: HCPCS

## 2023-12-09 NOTE — PROGRESS NOTES
Subjective:      Patient ID: Victorino Jones is a pleasant 82 y.o. female who presents today for:  No chief complaint on file. [facility]    Pt seen for monthly f/u visit for hx of A.fib with RVR, GERD, history of hemoptysis, and severe dementia. Patient has had no new behaviors. Doing well in her own room. No agitation. On exam rate is regular, lung sounds clear auscultation. No evidence of RVR or arrhythmia. Few ectopic beats noted however. Continue monitor. Nursing staff report no new coffee-ground emesis or hemoptysis. Overall no significant change compared to prior month visit. Will continue current care plan. Monitor for any acute change via nurse.       Patient Active Problem List   Diagnosis    Depression with anxiety    Hypertension    GERD (gastroesophageal reflux disease)    Elevated cholesterol    Atrial fibrillation with RVR (HCC)    Gait disturbance    Lumbar radicular pain    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 degeneration of both eyes    Spinal stenosis of lumbar region with radiculopathy    Senile osteoporosis    Right foot infection    Urinary tract infection without hematuria    AMS (altered mental status)    Coffee ground emesis    Dementia with behavioral disturbance (HCC)    MCI (mild cognitive impairment)    Frontal gait    Chronic pain of both knees    Height loss    Hypercalcemia    Joint stiffness of multiple sites    Multiple closed fractures of ribs of left side    Opioid dependence, daily use (HCC)    Secondary osteoarthritis of multiple sites    Wedge compression fracture of T11-T12 vertebra, initial encounter for closed fracture ([address])    Rhabdomyolysis    COVID-19 virus infection    Uterine procidentia    Severe dementia ([address])    Palliative care encounter    Advanced care planning/counseling discussion    Goals of care, counseling/discussion    DNR (do not resuscitate)    Encounter for hospice care discussion    Decreased oral intake    Tremor

## 2023-12-10 ENCOUNTER — APPOINTMENT (OUTPATIENT)
Dept: CT IMAGING | Age: 88
End: 2023-12-10
Payer: COMMERCIAL

## 2023-12-10 ENCOUNTER — HOSPITAL ENCOUNTER (EMERGENCY)
Age: 88
Discharge: NURSING, REHAB OR LONG-TERM CARE | End: 2023-12-10
Attending: EMERGENCY MEDICINE
Payer: COMMERCIAL

## 2023-12-10 VITALS
HEIGHT: 63 IN | TEMPERATURE: 97.7 F | HEART RATE: 65 BPM | BODY MASS INDEX: 24.27 KG/M2 | OXYGEN SATURATION: 94 % | SYSTOLIC BLOOD PRESSURE: 135 MMHG | DIASTOLIC BLOOD PRESSURE: 73 MMHG | RESPIRATION RATE: 17 BRPM
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
## 2023-12-10 PROCEDURE — 70450 CT HEAD/BRAIN W/O DYE: CPT

## 2023-12-10 PROCEDURE — 6830039000 HC L3 TRAUMA ALERT

## 2023-12-10 PROCEDURE — 72125 CT NECK SPINE W/O DYE: CPT

## 2023-12-10 PROCEDURE — 99284 EMERGENCY DEPT VISIT MOD MDM: CPT

## 2023-12-10 NOTE — ED PROVIDER NOTES
meds > 8 yrs / denies TIA or stroke    Lumbar radicular pain     Polycythemia vera(238.4)     Rib fracture 11/30/2018    Senile osteoporosis     Unspecified dementia, severe, without behavioral disturbance, psychotic disturbance, mood disturbance, and anxiety (HCC)     Urinary tract infection without hematuria 11/25/2022         SURGICALHISTORY       Past Surgical History:   Procedure Laterality Date    APPENDECTOMY      BACK SURGERY      COLONOSCOPY      ENDOSCOPY, COLON, DIAGNOSTIC      EYE SURGERY      OS eye glaucoma OR    FRACTURE SURGERY Left 05/06/2012    distal radial fracture/Dr. Lawton Bliss    HYSTERECTOMY (CERVIX STATUS UNKNOWN)      WI PERQ VERTEBROPLASTY UNI/BI INJX CERVICOTHORACIC N/A 3/7/2018    T-11, T-12 VERTEBRAL AUGMENTATION performed by Rosario Melissa MD at [address] [place] [address]       Previous Medications    ACETAMINOPHEN (TYLENOL) 325 MG TABLET    Take 2 tablets by mouth every 4 hours as needed for Pain or Fever    AMIODARONE (CORDARONE) 200 MG TABLET    Take 1 tablet by mouth 2 times daily    APIXABAN (ELIQUIS) 5 MG TABS TABLET    Take 1 tablet by mouth 2 times daily    BISACODYL (DULCOLAX) 10 MG SUPPOSITORY    Place 1 suppository rectally daily as needed for Constipation    BUSPIRONE HCL (BUSPAR PO)    Take 5 mg by mouth at bedtime Indications: Agitation    DILTIAZEM (CARDIZEM CD) 240 MG EXTENDED RELEASE CAPSULE    Take 1 capsule by mouth daily    DIVALPROEX (DEPAKOTE) 125 MG DR TABLET    Take 3 tablets by mouth in the morning and at bedtime    HANDICAP PLACARD MISC    by Does not apply route Start date:  12/18/2019 Expiration date 12/18/2024    LATANOPROST (XALATAN) 0.005 % OPHTHALMIC SOLUTION    Place 1 drop into both eyes nightly Indications: Glaucoma    MAGNESIUM OXIDE (MAG-OX) 400 (240 MG) MG TABLET    Take 1 tablet by mouth daily    METOPROLOL TARTRATE (LOPRESSOR) 50 MG TABLET    Take 1 tablet by mouth 2 times daily    MIRTAZAPINE (REMERON) 15 MG TABLET    Take 0.5 tablets by

## 2023-12-11 ENCOUNTER — TELEPHONE (OUTPATIENT)
Dept: PALLATIVE CARE | Age: 88
End: 2023-12-11

## 2023-12-11 NOTE — PROGRESS NOTES
Diagnosis Date    A-fib [facility])     Anxiety     Arthritis     Chronic back pain     DEGENERATIVE BACK    Dementia ([address])     Depression     Distal radial fracture 2012    GERD (gastroesophageal reflux disease)     Glaucoma     History of mammography, screening 2011 CAT 2 BILAT    History of osteopenia     Hypertension     meds > 8 yrs / denies TIA or stroke    Lumbar radicular pain     Polycythemia vera(238.4)     Rib fracture 11/30/2018    Senile osteoporosis     Unspecified dementia, severe, without behavioral disturbance, psychotic disturbance, mood disturbance, and anxiety (HCC)     Urinary tract infection without hematuria 11/25/2022     Past Surgical History:   Procedure Laterality Date    APPENDECTOMY      BACK SURGERY      COLONOSCOPY      ENDOSCOPY, COLON, DIAGNOSTIC      EYE SURGERY      OS eye glaucoma OR    FRACTURE SURGERY Left 05/06/2012    distal radial fracture/Dr. Jack Raw    HYSTERECTOMY (CERVIX STATUS UNKNOWN)      OR PERQ VERTEBROPLASTY UNI/BI INJX CERVICOTHORACIC N/A 3/7/2018    T-11, T-12 VERTEBRAL AUGMENTATION performed by Jaylon Alcantar MD at [address] [place] [address] History     Socioeconomic History    Marital status:       Spouse name: Not on file    Number of children: Not on file    Years of education: Not on file    Highest education level: Not on file   Occupational History    Not on file   Tobacco Use    Smoking status: Never    Smokeless tobacco: Never   Vaping Use    Vaping Use: Not on file   Substance and Sexual Activity    Alcohol use: No     Comment: denies    Drug use: No    Sexual activity: Never   Other Topics Concern    Not on file   Social History Narrative    Not on file     Social Determinants of Health     Financial Resource Strain: Low Risk  (7/31/2023)    Overall Financial Resource Strain (CARDIA)     Difficulty of Paying Living Expenses: Not hard at all   Food Insecurity: Not on file (11/20/2023)   Transportation Needs: No Transportation Needs (11/20/2023)    Transportation

## 2023-12-11 NOTE — TELEPHONE ENCOUNTER
Niecy Alcantar pt's daughter called in this morning to explain she wants her mom to be more comfortable. At the nursing facility they have her in a wheelchair throughout the daughter. The daughter explains that it is a hard wheelchair that she could easily fall out of and that the other residents in the common area have nicer wheelchairs that tilt back and have a headrest. She brought up possible hospice just wanting more comfort care for her mom. I told her I would contact the facility and see if we needed an order for a new wheelchair. I spoke with [facility] therapy dept and she explains that they moved Carlin into a tilt n space wheelchair today. I called the daughter to update her and at this time this is all she wanted for her mother. She will reach out in the future if she wants to pursue hospice services.

## 2023-12-12 DIAGNOSIS — F03.911 AGITATION DUE TO DEMENTIA (HCC): ICD-10-CM

## 2023-12-12 DIAGNOSIS — F03.918 DEMENTIA WITH BEHAVIORAL DISTURBANCE (HCC): Primary | ICD-10-CM

## 2023-12-13 RX ORDER — ALPRAZOLAM 0.5 MG/1
0.5 TABLET ORAL EVERY 12 HOURS PRN
Qty: 28 TABLET | Refills: 0 | Status: SHIPPED | OUTPATIENT
Start: 2023-12-13 | End: 2023-12-27

## 2023-12-19 ENCOUNTER — HOSPITAL ENCOUNTER (EMERGENCY)
Age: 88
Discharge: HOME | End: 2023-12-19
Attending: STUDENT IN AN ORGANIZED HEALTH CARE EDUCATION/TRAINING PROGRAM
Payer: COMMERCIAL

## 2023-12-19 ENCOUNTER — APPOINTMENT (OUTPATIENT)
Dept: GENERAL RADIOLOGY | Age: 88
End: 2023-12-19
Payer: COMMERCIAL

## 2023-12-19 VITALS
SYSTOLIC BLOOD PRESSURE: 150 MMHG | RESPIRATION RATE: 15 BRPM | BODY MASS INDEX: 23.04 KG/M2 | TEMPERATURE: 97.4 F | HEIGHT: 63 IN | WEIGHT: 130 LBS | OXYGEN SATURATION: 94 % | DIASTOLIC BLOOD PRESSURE: 77 MMHG | HEART RATE: 76 BPM

## 2023-12-19 DIAGNOSIS — N39.0 URINARY TRACT INFECTION WITH HEMATURIA, SITE UNSPECIFIED: Primary | ICD-10-CM

## 2023-12-19 DIAGNOSIS — R45.1 AGITATION: ICD-10-CM

## 2023-12-19 DIAGNOSIS — F03.918 DEMENTIA WITH OTHER BEHAVIORAL DISTURBANCE, UNSPECIFIED DEMENTIA SEVERITY, UNSPECIFIED DEMENTIA TYPE (HCC): ICD-10-CM

## 2023-12-19 DIAGNOSIS — N13.9 ACUTE URINARY OBSTRUCTION: ICD-10-CM

## 2023-12-19 DIAGNOSIS — R31.9 URINARY TRACT INFECTION WITH HEMATURIA, SITE UNSPECIFIED: Primary | ICD-10-CM

## 2023-12-19 LAB
ALBUMIN SERPL-MCNC: 3 G/DL (ref 3.5–4.6)
ALP SERPL-CCNC: 79 U/L (ref 40–130)
ALT SERPL-CCNC: 11 U/L (ref 0–33)
ANION GAP SERPL CALCULATED.3IONS-SCNC: 12 MEQ/L (ref 9–15)
AST SERPL-CCNC: 27 U/L (ref 0–35)
BACTERIA URNS QL MICRO: ABNORMAL /HPF
BASOPHILS # BLD: 0.1 K/UL (ref 0–0.2)
BASOPHILS NFR BLD: 0.6 %
BILIRUB SERPL-MCNC: 0.4 MG/DL (ref 0.2–0.7)
BILIRUB UR QL STRIP: NEGATIVE
BUN SERPL-MCNC: 27 MG/DL (ref 8–23)
CALCIUM SERPL-MCNC: 8.5 MG/DL (ref 8.5–9.9)
CHLORIDE SERPL-SCNC: 106 MEQ/L (ref 95–107)
CLARITY UR: ABNORMAL
CO2 SERPL-SCNC: 28 MEQ/L (ref 20–31)
COLOR UR: YELLOW
CREAT SERPL-MCNC: 0.66 MG/DL (ref 0.5–0.9)
EOSINOPHIL # BLD: 0.2 K/UL (ref 0–0.7)
EOSINOPHIL NFR BLD: 1.8 %
EPI CELLS #/AREA URNS AUTO: ABNORMAL /HPF (ref 0–5)
ERYTHROCYTE [DISTWIDTH] IN BLOOD BY AUTOMATED COUNT: 14.8 % (ref 11.5–14.5)
GLOBULIN SER CALC-MCNC: 3.4 G/DL (ref 2.3–3.5)
GLUCOSE SERPL-MCNC: 87 MG/DL (ref 70–99)
GLUCOSE UR STRIP-MCNC: NEGATIVE MG/DL
HCT VFR BLD AUTO: 55.1 % (ref 37–47)
HGB BLD-MCNC: 17.7 G/DL (ref 12–16)
HGB UR QL STRIP: NEGATIVE
HYALINE CASTS #/AREA URNS AUTO: ABNORMAL /HPF (ref 0–5)
KETONES UR STRIP-MCNC: 15 MG/DL
LEUKOCYTE ESTERASE UR QL STRIP: ABNORMAL
LYMPHOCYTES # BLD: 2 K/UL (ref 1–4.8)
LYMPHOCYTES NFR BLD: 22.1 %
MAGNESIUM SERPL-MCNC: 2.3 MG/DL (ref 1.7–2.4)
MCH RBC QN AUTO: 32.3 PG (ref 27–31.3)
MCHC RBC AUTO-ENTMCNC: 32.1 % (ref 33–37)
MCV RBC AUTO: 100.5 FL (ref 79.4–94.8)
MONOCYTES # BLD: 1 K/UL (ref 0.2–0.8)
MONOCYTES NFR BLD: 11.2 %
NEUTROPHILS # BLD: 5.6 K/UL (ref 1.4–6.5)
NEUTS SEG NFR BLD: 63.1 %
NITRITE UR QL STRIP: NEGATIVE
PH UR STRIP: 7 [PH] (ref 5–9)
PLATELET # BLD AUTO: 209 K/UL (ref 130–400)
POTASSIUM SERPL-SCNC: 4.6 MEQ/L (ref 3.4–4.9)
PROCALCITONIN SERPL IA-MCNC: 0.09 NG/ML (ref 0–0.15)
PROT SERPL-MCNC: 6.4 G/DL (ref 6.3–8)
PROT UR STRIP-MCNC: ABNORMAL MG/DL
RBC # BLD AUTO: 5.48 M/UL (ref 4.2–5.4)
RBC #/AREA URNS AUTO: ABNORMAL /HPF (ref 0–5)
SODIUM SERPL-SCNC: 146 MEQ/L (ref 135–144)
SP GR UR STRIP: 1.02 (ref 1–1.03)
URINE REFLEX TO CULTURE: YES
UROBILINOGEN UR STRIP-ACNC: 1 E.U./DL
WBC # BLD AUTO: 8.8 K/UL (ref 4.8–10.8)
WBC #/AREA URNS AUTO: >100 /HPF (ref 0–5)

## 2023-12-19 PROCEDURE — 87077 CULTURE AEROBIC IDENTIFY: CPT

## 2023-12-19 PROCEDURE — 81001 URINALYSIS AUTO W/SCOPE: CPT

## 2023-12-19 PROCEDURE — 96367 TX/PROPH/DG ADDL SEQ IV INF: CPT

## 2023-12-19 PROCEDURE — 36415 COLL VENOUS BLD VENIPUNCTURE: CPT

## 2023-12-19 PROCEDURE — 87186 SC STD MICRODIL/AGAR DIL: CPT

## 2023-12-19 PROCEDURE — 93005 ELECTROCARDIOGRAM TRACING: CPT | Performed by: STUDENT IN AN ORGANIZED HEALTH CARE EDUCATION/TRAINING PROGRAM

## 2023-12-19 PROCEDURE — 84145 PROCALCITONIN (PCT): CPT

## 2023-12-19 PROCEDURE — 6360000002 HC RX W HCPCS: Performed by: STUDENT IN AN ORGANIZED HEALTH CARE EDUCATION/TRAINING PROGRAM

## 2023-12-19 PROCEDURE — 2580000003 HC RX 258: Performed by: STUDENT IN AN ORGANIZED HEALTH CARE EDUCATION/TRAINING PROGRAM

## 2023-12-19 PROCEDURE — 71045 X-RAY EXAM CHEST 1 VIEW: CPT

## 2023-12-19 PROCEDURE — 80053 COMPREHEN METABOLIC PANEL: CPT

## 2023-12-19 PROCEDURE — 96375 TX/PRO/DX INJ NEW DRUG ADDON: CPT

## 2023-12-19 PROCEDURE — 87086 URINE CULTURE/COLONY COUNT: CPT

## 2023-12-19 PROCEDURE — 96361 HYDRATE IV INFUSION ADD-ON: CPT

## 2023-12-19 PROCEDURE — 96376 TX/PRO/DX INJ SAME DRUG ADON: CPT

## 2023-12-19 PROCEDURE — 85025 COMPLETE CBC W/AUTO DIFF WBC: CPT

## 2023-12-19 PROCEDURE — 99285 EMERGENCY DEPT VISIT HI MDM: CPT

## 2023-12-19 PROCEDURE — 72170 X-RAY EXAM OF PELVIS: CPT

## 2023-12-19 PROCEDURE — 74018 RADEX ABDOMEN 1 VIEW: CPT

## 2023-12-19 PROCEDURE — 96365 THER/PROPH/DIAG IV INF INIT: CPT

## 2023-12-19 PROCEDURE — 83735 ASSAY OF MAGNESIUM: CPT

## 2023-12-19 RX ORDER — SULFAMETHOXAZOLE AND TRIMETHOPRIM 800; 160 MG/1; MG/1
1 TABLET ORAL 2 TIMES DAILY
Qty: 14 TABLET | Refills: 0 | Status: SHIPPED | OUTPATIENT
Start: 2023-12-19 | End: 2023-12-19

## 2023-12-19 RX ORDER — MAGNESIUM SULFATE IN WATER 40 MG/ML
2000 INJECTION, SOLUTION INTRAVENOUS ONCE
Status: COMPLETED | OUTPATIENT
Start: 2023-12-19 | End: 2023-12-19

## 2023-12-19 RX ORDER — LORAZEPAM 2 MG/ML
1 INJECTION INTRAMUSCULAR ONCE
Status: COMPLETED | OUTPATIENT
Start: 2023-12-19 | End: 2023-12-19

## 2023-12-19 RX ORDER — 0.9 % SODIUM CHLORIDE 0.9 %
1000 INTRAVENOUS SOLUTION INTRAVENOUS ONCE
Status: COMPLETED | OUTPATIENT
Start: 2023-12-19 | End: 2023-12-19

## 2023-12-19 RX ORDER — SULFAMETHOXAZOLE AND TRIMETHOPRIM 800; 160 MG/1; MG/1
1 TABLET ORAL 2 TIMES DAILY
Qty: 14 TABLET | Refills: 0 | Status: SHIPPED | OUTPATIENT
Start: 2023-12-19 | End: 2023-12-26

## 2023-12-19 RX ADMIN — Medication 1 MG: at 14:25

## 2023-12-19 RX ADMIN — SODIUM CHLORIDE 1000 ML: 9 INJECTION, SOLUTION INTRAVENOUS at 11:32

## 2023-12-19 RX ADMIN — Medication 1 MG: at 13:04

## 2023-12-19 RX ADMIN — CEFTRIAXONE SODIUM 1000 MG: 1 INJECTION, POWDER, FOR SOLUTION INTRAMUSCULAR; INTRAVENOUS at 15:59

## 2023-12-19 RX ADMIN — MAGNESIUM SULFATE HEPTAHYDRATE 2000 MG: 40 INJECTION, SOLUTION INTRAVENOUS at 11:50

## 2023-12-19 NOTE — ED NOTES
Pt in room taking off her clothing and placing blanket over her head and placing her legs over the rails  of the bed. Pt unable to be redirected.

## 2023-12-19 NOTE — ED PROVIDER NOTES
[facility] ED  eMERGENCY dEPARTMENT eNCOUnter      Pt Name: Magdy Buckley  MRN: [id number]  [address] [facility] 1935  Date of evaluation: 12/19/2023  Provider: Samantha Giron MD  Note Started: 12/19/23 11:05 AM EST    HISTORY OF PRESENT ILLNESS      Chief Complaint   Patient presents with    Failure To Thrive     Pt comes from [facility] for failure to thrive, pt is a DNRCCA. Pt is currently being treated for a UTI. Pt is responsive to voice, pt is confused. Pt skin is pwd. Resp even and unlabored. The history is provided by the Patient and EMS. Magdy Buckley is a 82 y.o. female with a PMH clinically significant for HTN, HLD, Afib on Eliquis, GERD, OA, Osteoporosis, Chronic back pain, Anxiety, MDD, and Dementia presenting to the ED via EMS sent from SNF c/o FTT with increased confusion and decreased interactions earlier this AM. Reported to be recently treated for UTI and confusion. Also report issues with behavioral problems. No reported falls at the facility. Daughter reportedly concerned about possible diarrhea, but no reported hx of severe diarrhea from SNF. Patient speaking and interacting, but unable to significantly contribute to the Hx in the ED. Shaking head no to any current pain. Per Chart Review: PMH as noted above obtained via outpatient chart review. Most recent admission to the hospital from 11/20-11/24/2023 appreciated. Admitted for acute metabolic encephalopathy secondary to UTI and polypharmacy. Presented again to the ED on 12/10/2023 for unwitnessed fall on Eliquis. CT head and C-spine negative. Discharged back to nursing facility.       REVIEW OF SYSTEMS       Review of Systems  Otherwise as noted in HPI    PAST MEDICAL HISTORY     Past Medical History:   Diagnosis Date    A-fib [facility])     Anxiety     Arthritis     Chronic back pain     DEGENERATIVE BACK    Dementia ([address])     Depression     Distal radial fracture 2012    GERD (gastroesophageal reflux % 50 mL IVPB (mini-bag) (0 mg IntraVENous Stopped 12/19/23 7104)       Plan: Discharge back to SNF in stable condition. Follow up with Provider at facility and Urology at the first available appointment. Return to the ED if any new or worsening symptoms. Daughter made aware of plan by RN staff. CRITICAL CARE TIME   Total CriticalCare time was 0 minutes, excluding separately reportable procedures. There was a high probability of clinically significant/life threatening deterioration in the patient's condition which required my urgent intervention. FINAL IMPRESSION      1. Urinary tract infection with hematuria, site unspecified    2. Agitation    3. Dementia with other behavioral disturbance, unspecified dementia severity, unspecified dementia type ([address])    4.  Acute urinary obstruction          DISPOSITION/PLAN   DISPOSITION Decision To Discharge 12/19/2023 07:07:28 PM      Discharge Medication List as of 12/19/2023  7:08 PM           MD Deirdre Adame MD  12/22/23 7018

## 2023-12-19 NOTE — ED NOTES
Pt is alert, confused and combative, pulling at lines and taking her clothing off. Updated Dr. Leann Cortez.

## 2023-12-20 LAB
EKG ATRIAL RATE: 69 BPM
EKG P AXIS: 49 DEGREES
EKG P-R INTERVAL: 158 MS
EKG Q-T INTERVAL: 466 MS
EKG QRS DURATION: 74 MS
EKG QTC CALCULATION (BAZETT): 499 MS
EKG R AXIS: -13 DEGREES
EKG T AXIS: 129 DEGREES
EKG VENTRICULAR RATE: 69 BPM

## 2023-12-21 LAB
BACTERIA UR CULT: ABNORMAL
BACTERIA UR CULT: ABNORMAL
ORGANISM: ABNORMAL

## 2023-12-28 NOTE — PROGRESS NOTES
with anxiety    Hypertension    GERD (gastroesophageal reflux disease)    Elevated cholesterol    Atrial fibrillation with RVR (HCC)    Gait disturbance    Lumbar radicular pain    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 degeneration of both eyes    Spinal stenosis of lumbar region with radiculopathy    Senile osteoporosis    Right foot infection    Urinary tract infection without hematuria    AMS (altered mental status)    Coffee ground emesis    Dementia with behavioral disturbance (HCC)    MCI (mild cognitive impairment)    Frontal gait    Chronic pain of both knees    Height loss    Hypercalcemia    Joint stiffness of multiple sites    Multiple closed fractures of ribs of left side    Opioid dependence, daily use (HCC)    Secondary osteoarthritis of multiple sites    Wedge compression fracture of T11-T12 vertebra, initial encounter for closed fracture ([address])    Rhabdomyolysis    COVID-19 virus infection    Uterine procidentia    Severe dementia ([address])    Palliative care encounter    Advanced care planning/counseling discussion    Goals of care, counseling/discussion    DNR (do not resuscitate)    Encounter for hospice care discussion    Decreased oral intake    Tremor    Cervicalgia    Moderate late onset Alzheimer's dementia ([address])         PLAN:   Diagnosis Orders   1. Primary hypertension        2. Lumbar radicular pain        3. Dementia with behavioral disturbance ([address])        4. Tremor          Course of physical therapy outpatient therapy for support skin surveillance. Patient is able hitters medications follow-up psychiatry complete course hydrotherapy for urinary fraction BMP CBC pending. Ongoing supportive care    Please note orders entered on site at facility after discussion with appropriate facility nursing/therapy/ / nutritional staff. Current longstanding medical problems and acute medical issues addressed with staff.  Available data and data elements in on site paper chart

## 2024-01-22 NOTE — PROGRESS NOTES
no distension.      Palpations: Abdomen is soft. There is no mass.      Tenderness: There is no abdominal tenderness. There is guarding.      Comments: No apparent epigastric pain   Genitourinary:     Comments: DEFERRED  Skin:     General: Skin is warm and dry.      Coloration: Skin is pale.   Neurological:      General: No focal deficit present.      Mental Status: She is alert. Mental status is at baseline. She is disoriented.      Motor: Weakness present.      Gait: Gait normal.   Psychiatric:         Attention and Perception: She is inattentive.         Mood and Affect: Affect is flat.         Speech: She is noncommunicative.         Assessment:       Diagnosis Orders   1. Hypernatremia        2. Recurrent UTI        3. Failure to thrive in adult              Plan:      No orders of the defined types were placed in this encounter.    No orders of the defined types were placed in this encounter.    Will have hospice consult ordered.  Continue with antibiotic and current medications.  If no or worsening oral intake, will add IV fluids at low rate to compensate.  Will monitor labs as needed.      No follow-ups on file.      KIRBY Caba    Electronically signed by: KIRBY Caba on 1/22/2024    Please note orders entered on site at facility after discussion with appropriate facility nursing/therapy/ / nutritional staff. Current longstanding medical problems and acute medical issues addressed with staff. Available data and data elements in on site paper chart reviewed and analyzed.  Current external consultant notes reviewed in on site chart. Ordered laboratory testing and imaging will be reviewed when available.     Side effects, adverse effects of the medication prescribed today, as well as treatment plan and result expectations have been discussed withthe patient who expresses understanding and desires to proceed.    I spent a total of 25 minutes on the date of service which included preparing to

## 2024-04-03 NOTE — TELEPHONE ENCOUNTER
" Assessment/Plan:         Diagnoses and all orders for this visit:    Possible exposure to STI  Comments:  pt reports multiple sexual partners  check labs and urine  Orders:  -     Comprehensive metabolic panel; Future  -     CBC and differential; Future  -     TSH, 3rd generation; Future  -     RPR-Syphilis Screening (Total Syphilis IGG/IGM); Future  -     HIV 1/2 AG/AB w Reflex [facility] for 2 yr old and above; Future  -     POCT urine dip    Skin lesion  Comments:  bactroban bid  f/u with derm if not improved  Orders:  -     mupirocin (BACTROBAN) 2 % ointment; Apply topically 2 (two) times a day  -     Adhesive Bandages (Bandages Fabric Strips 3/4\") MISC; Use 2 (two) times a day    Acquired hypothyroidism  Comments:  continue levothyroxine  Orders:  -     Comprehensive metabolic panel; Future  -     CBC and differential; Future  -     TSH, 3rd generation; Future  -     RPR-Syphilis Screening (Total Syphilis IGG/IGM); Future  -     HIV 1/2 AG/AB w Reflex [facility] for 2 yr old and above; Future    Mixed hyperlipidemia  -     Comprehensive metabolic panel; Future  -     CBC and differential; Future  -     TSH, 3rd generation; Future  -     RPR-Syphilis Screening (Total Syphilis IGG/IGM); Future  -     HIV 1/2 AG/AB w Reflex [facility] for 2 yr old and above; Future    Schizoaffective disorder, unspecified type (HCC)  -     Comprehensive metabolic panel; Future  -     CBC and differential; Future  -     TSH, 3rd generation; Future  -     RPR-Syphilis Screening (Total Syphilis IGG/IGM); Future  -     HIV 1/2 AG/AB w Reflex [facility] for 2 yr old and above; Future    Dysuria  Comments:  check u/a  Orders:  -     Chlamydia/GC amplified DNA by PCR; Future          Subjective:      Patient ID: Laine Tse is a 72 y.o. female.    73 y/o female w/ c/o rash in groin and urinary frequency - history difficult to obtain from patient, presents with aid who is present during exam  Pt preoccupied with lesion on L knee, had derm apt last week " Patient son calls asking for a refill of zyrtec states katia is now having pain in her ear and is wondering if anti-biotic is needed. Please advise. "and has been applying bandaid but now slight redness and pt concerned  Asked pt to call dermatology if worsens    Pt also reports concern for STI  Pt concerned with \"veneral disease\" and wants an antibiotic   \"I have multiple sexual partners\"  Unsure if this is accurate   Will check urine in office and check labs  Pt requests labs to be drawn       Rash  Pertinent negatives include no congestion, cough, diarrhea, fever, sore throat or vomiting.       The following portions of the patient's history were reviewed and updated as appropriate: allergies, current medications, past family history, past medical history, past social history, past surgical history, and problem list.    Review of Systems   Constitutional:  Negative for appetite change, chills and fever.   HENT:  Negative for congestion and sore throat.    Eyes:  Negative for pain and redness.   Respiratory:  Negative for cough.    Cardiovascular:  Negative for chest pain and leg swelling.   Gastrointestinal:  Negative for abdominal pain, diarrhea and vomiting.   Genitourinary:  Positive for dysuria and frequency. Negative for hematuria.   Skin:  Positive for rash.   Neurological:  Negative for dizziness and headaches.   Psychiatric/Behavioral:  Negative for sleep disturbance. The patient is nervous/anxious.          Past Medical History:   Diagnosis Date    Anxiety     Benign essential hypertension     resolved; 06/15/16    Depression     Depression with anxiety     Fracture of fifth metatarsal bone of right foot with delayed healing     last assessed 04/12/16; fracture of metatarsal bone, right, closed, initial encounter    Hyperlipidemia     last assessed 11/28/17    Hypothyroidism     last assessed 11/28/2017    Insomnia     Obesity     Schizoaffective disorder (HCC)     last assessed 05/18/2017    Seizure disorder (HCC)     last assessed 03/28/17    Seizures ([facility])          Current Outpatient Medications:     acetaminophen (TYLENOL) 500 mg tablet, Take 2 " "tablets (1,000 mg total) by mouth 3 (three) times a day as needed for mild pain or fever, Disp: 180 tablet, Rfl: 1    Adhesive Bandages (Bandages Fabric Strips 3/4\") MISC, Use 2 (two) times a day, Disp: 100 each, Rfl: 0    alendronate (FOSAMAX) 70 mg tablet, TAKE 1 TABLET BY MOUTH WEEKLY ON FRIDAYS @ 8AM (OSTEOPORPSIS), Disp: 4 tablet, Rfl: 5    aspirin 81 mg chewable tablet, Chew 1 tablet (81 mg total) daily, Disp: 90 tablet, Rfl: 1    atorvastatin (LIPITOR) 20 mg tablet, Take 1 tablet (20 mg total) by mouth daily, Disp: 30 tablet, Rfl: 5    benzonatate (TESSALON) 200 MG capsule, TAKE 1 CAPSULE BY MOUTH 3 TIMES DAILY AS NEEDED FOR COUGH*AHMAD, Disp: 30 capsule, Rfl: 0    busPIRone (BUSPAR) 15 mg tablet, Take 15 mg by mouth 2 (two) times a day , Disp: , Rfl:     calcium carbonate-vitamin D (OSCAL-D) 500 mg-200 units per tablet, Take 1 tablet by mouth 2 (two) times a day with meals, Disp: 180 tablet, Rfl: 1    cetaphil (CETAPHIL) lotion, Apply topically as needed for dry skin, Disp: 237 mL, Rfl: 0    cetirizine (ZyrTEC) 5 MG tablet, TAKE 1 TABLET BY MOUTH AT BEDTIME AS NEEDED FOR ALLERGIES *AHMAD, Disp: 30 tablet, Rfl: 0    chlorhexidine (PERIDEX) 0.12 % solution, 1 tbsp by mouth 2 times daily, swish and spit, Disp: , Rfl:     dextromethorphan-guaiFENesin (ROBITUSSIN DM)  mg/5 mL syrup, Take 5 mL by mouth 2 (two) times a day as needed for cough, Disp: 100 mL, Rfl: 0    Diclofenac Sodium (VOLTAREN) 1 %, Apply 2 g topically 4 (four) times a day 2 g right knee 4 times per day, Disp: 100 g, Rfl: 5    diphenhydrAMINE (BENADRYL) 50 mg capsule, Take 1 capsule (50 mg total) by mouth daily at bedtime as needed for sleep, Disp: 90 capsule, Rfl: 2    divalproex sodium (DEPAKOTE) 500 mg EC tablet, 3 tabs(1500mg) at bedtime, Disp: , Rfl:     docusate sodium (COLACE) 100 mg capsule, Take 1 capsule (100 mg total) by mouth 2 (two) times a day as needed for constipation, Disp: 180 capsule, Rfl: 1    fluticasone (FLONASE) 50 " mcg/act nasal spray, INSTILL 2 SPRAYS IN EACH NOSTRIL DAILY @ 8AM(ALLERGIES) *AHMAD, Disp: 16 g, Rfl: 3    gabapentin (NEURONTIN) 100 mg capsule, Take 200 mg by mouth 2 (two) times a day  , Disp: , Rfl:     GNP Pink Bismuth 262 MG chewable tablet, CHEW 2 TABLETS(524MG) BY MOUTH EVERY 6 HOURS AS NEEDED FOR DIARRHEA, Disp: 120 tablet, Rfl: 0    L-Theanine 100 MG CAPS, Take 200 mg by mouth 2 (two) times a day  , Disp: , Rfl:     levothyroxine 100 mcg tablet, Take 1 tablet (100 mcg total) by mouth daily One daily at 6 AM- thyroid, Disp: 30 tablet, Rfl: 5    Lidocaine (HM Lidocaine Patch) 4 % PTCH, Apply 1 patch topically daily as needed (back pain), Disp: 30 patch, Rfl: 2    LORazepam (ATIVAN) 0.5 mg tablet, Take 0.5 mg by mouth every 12 (twelve) hours as needed for anxiety, Disp: , Rfl:     LORazepam (ATIVAN) 1 mg tablet, Take 1 mg by mouth in the morning and 1 mg in the evening and 1 mg before bedtime., Disp: , Rfl:     loxapine (LOXITANE) 25 mg capsule, Take 25 mg by mouth daily at bedtime Take with 50 mg dose = 75 mg, Disp: , Rfl:     loxapine (LOXITANE) 50 MG capsule, Take 75 mg by mouth daily at bedtime, Disp: , Rfl:     mupirocin (BACTROBAN) 2 % ointment, Apply topically 2 (two) times a day, Disp: 50 g, Rfl: 0    polyethylene glycol (GLYCOLAX) 17 GM/SCOOP, MIX 1 CAPFUL(17GM) IN 8OZ OF LIQUID AND DRINK DAILY @ 8AM(CONSTIPATION) *AHMAD, Disp: 510 g, Rfl: 5    Saphris 10 MG SL tablet, Place 1 tablet (10 mg total) under the tongue 2 (two) times a day, Disp: 60 tablet, Rfl: 1    senna-docusate sodium (SENOKOT-S) 8.6-50 mg per tablet, Take 1 tablet by mouth daily, Disp: , Rfl:     sodium chloride (OCEAN) 0.65 % nasal spray, 1 spray into each nostril as needed for congestion TAKE UP TO 6 TIMES DAILY AS NEEDED, Disp: 30 mL, Rfl: 3    Thera (THERA/BETA-CAROTENE), Take 1 tablet by mouth daily, Disp: 30 tablet, Rfl: 5    traZODone (DESYREL) 100 mg tablet, Take 200 mg by mouth daily at bedtime, Disp: , Rfl:     triamcinolone  "(KENALOG) 0.5 % cream, Apply topically 3 (three) times a day for 10 days, Disp: 15 g, Rfl: 0    trihexyphenidyl (ARTANE) 5 mg tablet, Take 5 mg by mouth 3 (three) times a day with meals, Disp: , Rfl:     PHENobarbital 64.8 mg tablet, TAKE 1 TABLET BY MOUTH 2X DAILY @8AM-8PM(SEIZURES) *STONE, Disp: 60 tablet, Rfl: 5  No current facility-administered medications for this visit.    Facility-Administered Medications Ordered in Other Visits:     lactated ringers infusion, , Intravenous, Continuous PRN, Celi Nomi Preston, CRNA, New Bag at 02/08/23 1400    Allergies   Allergen Reactions    Clozapine Other (See Comments)     low white blood count  Other reaction(s): Other (See Comments)  low white blood count    Haloperidol Other (See Comments)     EPS  Other reaction(s): Other (See Comments)  EPS    Lithium Other (See Comments)     Toxicity    Prolixin [Fluphenazine] Hyperactivity and Other (See Comments)     EPS, Hyperactivity  EPS, Hyperactivity    Valproic Acid Confusion and Other (See Comments)     \"Mental confusion\"  \"Mental confusion\"       Social History   Past Surgical History:   Procedure Laterality Date    COLONOSCOPY      complete    PA COLONOSCOPY FLX DX W/COLLJ SPEC WHEN PFRMD N/A 8/30/2018    Procedure: COLONOSCOPY with polypectomies;  Surgeon: Andriy Lopez MD;  Location: [facility];  Service: Gastroenterology     Family History   Problem Relation Age of Onset    No Known Problems Mother     No Known Problems Father     Lung disease Other         mesothelioma    No Known Problems Maternal Grandmother     No Known Problems Maternal Grandfather     No Known Problems Paternal Grandmother     No Known Problems Paternal Grandfather     No Known Problems Sister     No Known Problems Sister     Kidney failure Brother     No Known Problems Maternal Aunt     No Known Problems Maternal Aunt     No Known Problems Maternal Aunt     No Known Problems Maternal Aunt     No Known Problems Paternal Aunt     No Known " "Problems Paternal Aunt        Objective:  /74 (BP Location: Left arm, Patient Position: Sitting, Cuff Size: Large)   Pulse 85   Temp 98.5 °F (36.9 °C) (Temporal)   Ht 5' 4\" (1.626 m)   Wt 81.6 kg (180 lb)   SpO2 99% Comment: room air  BMI 30.90 kg/m²        Physical Exam  Vitals reviewed.   Constitutional:       General: She is not in acute distress.  HENT:      Head: Normocephalic.      Nose: Nose normal.      Mouth/Throat:      Mouth: Mucous membranes are moist.   Cardiovascular:      Rate and Rhythm: Normal rate and regular rhythm.   Pulmonary:      Effort: Pulmonary effort is normal. No respiratory distress.      Breath sounds: No wheezing.   Musculoskeletal:      Cervical back: Normal range of motion. No rigidity.      Right lower leg: No edema.      Left lower leg: No edema.   Skin:     Findings: Lesion (L knee -ulceration with mild erythema no drainage) present.   Neurological:      Mental Status: She is alert. Mental status is at baseline.   Psychiatric:         Mood and Affect: Mood normal.         "

## (undated) DEVICE — FLEXIBLE ADHESIVE BANDAGE,X-LARGE: Brand: CURITY

## (undated) DEVICE — TOWEL,OR,DSP,ST,BLUE,STD,4/PK,20PK/CS: Brand: MEDLINE

## (undated) DEVICE — SKIN MARKER,REGULAR TIP WITH RULER: Brand: DEVON

## (undated) DEVICE — GLOVE SURG SZ 75 L12IN FNGR THK83MIL CRM POLYISOPRENE

## (undated) DEVICE — 3M™ IOBAN™ 2 ANTIMICROBIAL INCISE DRAPE 6650EZ: Brand: IOBAN™ 2

## (undated) DEVICE — 3M™ STERI-STRIP™ REINFORCED ADHESIVE SKIN CLOSURES, R1547, 1/2 IN X 4 IN (12 MM X 100 MM), 6 STRIPS/ENVELOPE: Brand: 3M™ STERI-STRIP™

## (undated) DEVICE — CHLORAPREP 26ML ORANGE

## (undated) DEVICE — PACK,LAPAROTOMY,NO GOWNS: Brand: MEDLINE

## (undated) DEVICE — SHEET,DRAPE,53X77,STERILE: Brand: MEDLINE

## (undated) DEVICE — INTENDED FOR TISSUE SEPARATION, AND OTHER PROCEDURES THAT REQUIRE A SHARP SURGICAL BLADE TO PUNCTURE OR CUT.: Brand: BARD-PARKER ® CARBON RIB-BACK BLADES

## (undated) DEVICE — 1842 FOAM BLOCK NEEDLE COUNTER: Brand: DEVON

## (undated) DEVICE — X-RAY DETECTABLE SPONGES,16 PLY: Brand: VISTEC

## (undated) DEVICE — STERILE DISPOSABLE EQUIPMENT COVER - BAND BAG 30" X 18": Brand: PREFERRED MEDICAL PRODUCTS, LLC

## (undated) DEVICE — LABEL MED MINI W/ MARKER

## (undated) DEVICE — Z DISCONTINUED APPLICATOR SURG PREP 0.35OZ 2% CHG 70% ISO ALC W/ HI LT

## (undated) DEVICE — NEEDLE SPNL 22GAX1 1/2IN

## (undated) DEVICE — GLOVE SURG SZ 8 L12IN FNGR THK83MIL CRM POLYISOPRENE

## (undated) DEVICE — GOWN,AURORA,NONREINFORCED,LARGE: Brand: MEDLINE

## (undated) DEVICE — INSERT CUSHION HEAD PRONEVIEW